# Patient Record
Sex: MALE | Race: WHITE | NOT HISPANIC OR LATINO
[De-identification: names, ages, dates, MRNs, and addresses within clinical notes are randomized per-mention and may not be internally consistent; named-entity substitution may affect disease eponyms.]

---

## 2019-05-01 ENCOUNTER — NON-APPOINTMENT (OUTPATIENT)
Age: 69
End: 2019-05-01

## 2019-05-01 ENCOUNTER — APPOINTMENT (OUTPATIENT)
Dept: CARDIOLOGY | Facility: CLINIC | Age: 69
End: 2019-05-01
Payer: COMMERCIAL

## 2019-05-01 VITALS
BODY MASS INDEX: 33.43 KG/M2 | RESPIRATION RATE: 16 BRPM | HEIGHT: 66 IN | SYSTOLIC BLOOD PRESSURE: 124 MMHG | HEART RATE: 56 BPM | WEIGHT: 208 LBS | DIASTOLIC BLOOD PRESSURE: 80 MMHG

## 2019-05-01 DIAGNOSIS — Z78.9 OTHER SPECIFIED HEALTH STATUS: ICD-10-CM

## 2019-05-01 DIAGNOSIS — Z82.49 FAMILY HISTORY OF ISCHEMIC HEART DISEASE AND OTHER DISEASES OF THE CIRCULATORY SYSTEM: ICD-10-CM

## 2019-05-01 DIAGNOSIS — Z72.3 LACK OF PHYSICAL EXERCISE: ICD-10-CM

## 2019-05-01 PROCEDURE — 99204 OFFICE O/P NEW MOD 45 MIN: CPT

## 2019-05-01 PROCEDURE — 93000 ELECTROCARDIOGRAM COMPLETE: CPT

## 2019-05-01 RX ORDER — EZETIMIBE AND SIMVASTATIN 10; 20 MG/1; MG/1
10-20 TABLET ORAL DAILY
Qty: 90 | Refills: 3 | Status: ACTIVE | COMMUNITY

## 2019-05-01 RX ORDER — FINASTERIDE 5 MG/1
5 TABLET, FILM COATED ORAL
Qty: 90 | Refills: 3 | Status: ACTIVE | COMMUNITY

## 2019-05-03 RX ORDER — OMEPRAZOLE MAGNESIUM 40 MG/1
40 CAPSULE, DELAYED RELEASE ORAL
Refills: 0 | Status: DISCONTINUED | COMMUNITY
End: 2019-05-03

## 2019-05-03 NOTE — REASON FOR VISIT
[FreeTextEntry1] : This is a 68-year-old male who presents here for initial cardiac evaluation with concerns about mid-sternal chest discomfort.  He has had these symptoms on and off since January.  They typically occur when he is anxious or stressed and described as a pressure-like discomfort.  It can last a variable period of time from a few minutes up to 15-20 minutes.  There is usually no radiation.  \par \par He is not physically active, however, 10 days ago he climbed three flights of stairs and had the same exact symptoms of pressure-like discomfort with some arm numbness and symptoms again lasted about 15 minutes, this time in association with shortness of breath.  \par \par The patient has no preexisting history of cardiovascular disease.  He does have risk factors which include:\par 1.  Hypertension.\par 2.  Hyperlipidemia.\par 3.  A very strong family history (father with CABG and brother stents).\par \par His home blood pressure typically runs about 135/85-90.  He does not have any palpitations or dizziness.   He does have obstructive sleep apnea but uses a CPAP for this. \par \par \par

## 2019-05-03 NOTE — ASSESSMENT
[FreeTextEntry1] : ECG shows low atrial ectopic rhythm with bradycardia at 56, left atrial enlargement and no significant ST-T wave changes.  \par \par Laboratory data from 4/5/19 shows total cholesterol of 156, HDL 47, LDL 73, triglycerides of 81. \par \par Impression:\par The patient is a 68-year-old with risk factors including:\par 1.  A strong family history.\par 2.  Hypertension. \par 3.  Hyperlipidemia. \par \par He has a chest pain syndrome with some typical and atypical features.  Because of his age and risk factor profile, a full evaluation should be undertaken to include an echocardiogram and an exercise sestamibi stress test.  The reason for going straight to the nuclear stress test is that his pre-test probability is fairly high and as such the regular stress test when negative would not be satisfied that this sufficiently excludes coronary artery disease. \par \par Blood pressure is probably too high and an adjustment of his antihypertensive regimen probably will need to be made. \par \par His cholesterol seems to be adequately controlled at this point in time.  \par \par Recommendations include:\par 1.  Beginning Plavix 75 mg a day. and convert omeprazole to Protonix 40 mg daily  \par 2.  Cannot use aspirin because of his history of GERD and very sensitive stomach.  \par 3.  Will give the patient sublingual nitroglycerine to use in a therapeutic trial should he have any further pain. \par 4.  Encouraged the patient to contact me if he has any change in his chest pain pattern. \par 4.  Will regroup after the nuclear stress test and the echocardiogram. \par

## 2019-05-13 ENCOUNTER — APPOINTMENT (OUTPATIENT)
Dept: CARDIOLOGY | Facility: CLINIC | Age: 69
End: 2019-05-13
Payer: COMMERCIAL

## 2019-05-13 PROCEDURE — 93015 CV STRESS TEST SUPVJ I&R: CPT

## 2019-05-13 PROCEDURE — A9500: CPT

## 2019-05-13 PROCEDURE — 78452 HT MUSCLE IMAGE SPECT MULT: CPT

## 2019-05-13 RX ADMIN — REGADENOSON 0 MG/5ML: 0.08 INJECTION, SOLUTION INTRAVENOUS at 00:00

## 2019-05-13 RX ADMIN — AMINOPHYLLINE 3 MG/ML: 25 INJECTION, SOLUTION INTRAVENOUS at 00:00

## 2019-05-14 RX ORDER — AMINOPHYLLINE 25 MG/ML
25 INJECTION, SOLUTION INTRAVENOUS
Qty: 0 | Refills: 0 | Status: COMPLETED | OUTPATIENT
Start: 2019-05-13

## 2019-05-14 RX ORDER — REGADENOSON 0.08 MG/ML
0.4 INJECTION, SOLUTION INTRAVENOUS
Qty: 4 | Refills: 0 | Status: COMPLETED | OUTPATIENT
Start: 2019-05-13

## 2019-05-15 ENCOUNTER — TRANSCRIPTION ENCOUNTER (OUTPATIENT)
Age: 69
End: 2019-05-15

## 2019-05-15 ENCOUNTER — INPATIENT (INPATIENT)
Facility: HOSPITAL | Age: 69
LOS: 8 days | Discharge: HOME | DRG: 234 | End: 2019-05-24
Attending: THORACIC SURGERY (CARDIOTHORACIC VASCULAR SURGERY) | Admitting: THORACIC SURGERY (CARDIOTHORACIC VASCULAR SURGERY)
Payer: COMMERCIAL

## 2019-05-15 VITALS
SYSTOLIC BLOOD PRESSURE: 129 MMHG | HEART RATE: 54 BPM | DIASTOLIC BLOOD PRESSURE: 96 MMHG | OXYGEN SATURATION: 99 % | RESPIRATION RATE: 16 BRPM | TEMPERATURE: 99 F

## 2019-05-15 DIAGNOSIS — I25.110 ATHEROSCLEROTIC HEART DISEASE OF NATIVE CORONARY ARTERY WITH UNSTABLE ANGINA PECTORIS: ICD-10-CM

## 2019-05-15 DIAGNOSIS — Z98.890 OTHER SPECIFIED POSTPROCEDURAL STATES: Chronic | ICD-10-CM

## 2019-05-15 DIAGNOSIS — R94.39 ABNORMAL RESULT OF OTHER CARDIOVASCULAR FUNCTION STUDY: ICD-10-CM

## 2019-05-15 DIAGNOSIS — K21.9 GASTRO-ESOPHAGEAL REFLUX DISEASE WITHOUT ESOPHAGITIS: ICD-10-CM

## 2019-05-15 DIAGNOSIS — G47.33 OBSTRUCTIVE SLEEP APNEA (ADULT) (PEDIATRIC): ICD-10-CM

## 2019-05-15 DIAGNOSIS — E78.5 HYPERLIPIDEMIA, UNSPECIFIED: ICD-10-CM

## 2019-05-15 DIAGNOSIS — R07.9 CHEST PAIN, UNSPECIFIED: ICD-10-CM

## 2019-05-15 DIAGNOSIS — I10 ESSENTIAL (PRIMARY) HYPERTENSION: ICD-10-CM

## 2019-05-15 LAB
ABO RH CONFIRMATION: SIGNIFICANT CHANGE UP
ALBUMIN SERPL ELPH-MCNC: 4.2 G/DL — SIGNIFICANT CHANGE UP (ref 3.3–5.2)
ALP SERPL-CCNC: 58 U/L — SIGNIFICANT CHANGE UP (ref 40–120)
ALT FLD-CCNC: 17 U/L — SIGNIFICANT CHANGE UP
ANION GAP SERPL CALC-SCNC: 11 MMOL/L — SIGNIFICANT CHANGE UP (ref 5–17)
ANION GAP SERPL CALC-SCNC: 14 MMOL/L — SIGNIFICANT CHANGE UP (ref 5–17)
APPEARANCE UR: CLEAR — SIGNIFICANT CHANGE UP
APTT BLD: 28.4 SEC — SIGNIFICANT CHANGE UP (ref 27.5–36.3)
AST SERPL-CCNC: 19 U/L — SIGNIFICANT CHANGE UP
BACTERIA # UR AUTO: NEGATIVE — SIGNIFICANT CHANGE UP
BASOPHILS # BLD AUTO: 0 K/UL — SIGNIFICANT CHANGE UP (ref 0–0.2)
BASOPHILS NFR BLD AUTO: 0.1 % — SIGNIFICANT CHANGE UP (ref 0–2)
BILIRUB DIRECT SERPL-MCNC: 0.1 MG/DL — SIGNIFICANT CHANGE UP (ref 0–0.3)
BILIRUB INDIRECT FLD-MCNC: 0.4 MG/DL — SIGNIFICANT CHANGE UP (ref 0.2–1)
BILIRUB SERPL-MCNC: 0.5 MG/DL — SIGNIFICANT CHANGE UP (ref 0.4–2)
BILIRUB UR-MCNC: NEGATIVE — SIGNIFICANT CHANGE UP
BLD GP AB SCN SERPL QL: SIGNIFICANT CHANGE UP
BUN SERPL-MCNC: 17 MG/DL — SIGNIFICANT CHANGE UP (ref 8–20)
BUN SERPL-MCNC: 21 MG/DL — HIGH (ref 8–20)
CALCIUM SERPL-MCNC: 10.1 MG/DL — SIGNIFICANT CHANGE UP (ref 8.6–10.2)
CALCIUM SERPL-MCNC: 9.2 MG/DL — SIGNIFICANT CHANGE UP (ref 8.6–10.2)
CHLORIDE SERPL-SCNC: 104 MMOL/L — SIGNIFICANT CHANGE UP (ref 98–107)
CHLORIDE SERPL-SCNC: 108 MMOL/L — HIGH (ref 98–107)
CHOLEST SERPL-MCNC: 122 MG/DL — SIGNIFICANT CHANGE UP (ref 110–199)
CO2 SERPL-SCNC: 23 MMOL/L — SIGNIFICANT CHANGE UP (ref 22–29)
CO2 SERPL-SCNC: 25 MMOL/L — SIGNIFICANT CHANGE UP (ref 22–29)
COLOR SPEC: YELLOW — SIGNIFICANT CHANGE UP
CREAT SERPL-MCNC: 0.79 MG/DL — SIGNIFICANT CHANGE UP (ref 0.5–1.3)
CREAT SERPL-MCNC: 0.8 MG/DL — SIGNIFICANT CHANGE UP (ref 0.5–1.3)
DIFF PNL FLD: ABNORMAL
EOSINOPHIL # BLD AUTO: 0.1 K/UL — SIGNIFICANT CHANGE UP (ref 0–0.5)
EOSINOPHIL NFR BLD AUTO: 1 % — SIGNIFICANT CHANGE UP (ref 0–5)
EPI CELLS # UR: NEGATIVE — SIGNIFICANT CHANGE UP
GLUCOSE SERPL-MCNC: 119 MG/DL — HIGH (ref 70–115)
GLUCOSE SERPL-MCNC: 93 MG/DL — SIGNIFICANT CHANGE UP (ref 70–115)
GLUCOSE UR QL: NEGATIVE MG/DL — SIGNIFICANT CHANGE UP
HBA1C BLD-MCNC: 5.5 % — SIGNIFICANT CHANGE UP (ref 4–5.6)
HCT VFR BLD CALC: 42.8 % — SIGNIFICANT CHANGE UP (ref 42–52)
HCT VFR BLD CALC: 45.7 % — SIGNIFICANT CHANGE UP (ref 42–52)
HDLC SERPL-MCNC: 46 MG/DL — SIGNIFICANT CHANGE UP
HGB BLD-MCNC: 14.3 G/DL — SIGNIFICANT CHANGE UP (ref 14–18)
HGB BLD-MCNC: 15.1 G/DL — SIGNIFICANT CHANGE UP (ref 14–18)
INR BLD: 1.06 RATIO — SIGNIFICANT CHANGE UP (ref 0.88–1.16)
INR BLD: 1.08 RATIO — SIGNIFICANT CHANGE UP (ref 0.88–1.16)
KETONES UR-MCNC: NEGATIVE — SIGNIFICANT CHANGE UP
LEUKOCYTE ESTERASE UR-ACNC: NEGATIVE — SIGNIFICANT CHANGE UP
LIPID PNL WITH DIRECT LDL SERPL: 64 MG/DL — SIGNIFICANT CHANGE UP
LYMPHOCYTES # BLD AUTO: 1.9 K/UL — SIGNIFICANT CHANGE UP (ref 1–4.8)
LYMPHOCYTES # BLD AUTO: 28.2 % — SIGNIFICANT CHANGE UP (ref 20–55)
MAGNESIUM SERPL-MCNC: 2.2 MG/DL — SIGNIFICANT CHANGE UP (ref 1.6–2.6)
MCHC RBC-ENTMCNC: 27.8 PG — SIGNIFICANT CHANGE UP (ref 27–31)
MCHC RBC-ENTMCNC: 28.1 PG — SIGNIFICANT CHANGE UP (ref 27–31)
MCHC RBC-ENTMCNC: 33 G/DL — SIGNIFICANT CHANGE UP (ref 32–36)
MCHC RBC-ENTMCNC: 33.4 G/DL — SIGNIFICANT CHANGE UP (ref 32–36)
MCV RBC AUTO: 84.1 FL — SIGNIFICANT CHANGE UP (ref 80–94)
MCV RBC AUTO: 84.2 FL — SIGNIFICANT CHANGE UP (ref 80–94)
MONOCYTES # BLD AUTO: 0.7 K/UL — SIGNIFICANT CHANGE UP (ref 0–0.8)
MONOCYTES NFR BLD AUTO: 9.9 % — SIGNIFICANT CHANGE UP (ref 3–10)
NEUTROPHILS # BLD AUTO: 4.1 K/UL — SIGNIFICANT CHANGE UP (ref 1.8–8)
NEUTROPHILS NFR BLD AUTO: 60.7 % — SIGNIFICANT CHANGE UP (ref 37–73)
NITRITE UR-MCNC: NEGATIVE — SIGNIFICANT CHANGE UP
NT-PROBNP SERPL-SCNC: 214 PG/ML — SIGNIFICANT CHANGE UP (ref 0–300)
PA ADP PRP-ACNC: 153 PRU — LOW (ref 180–376)
PH UR: 6 — SIGNIFICANT CHANGE UP (ref 5–8)
PLATELET # BLD AUTO: 187 K/UL — SIGNIFICANT CHANGE UP (ref 150–400)
PLATELET # BLD AUTO: 196 K/UL — SIGNIFICANT CHANGE UP (ref 150–400)
POTASSIUM SERPL-MCNC: 3.7 MMOL/L — SIGNIFICANT CHANGE UP (ref 3.5–5.3)
POTASSIUM SERPL-MCNC: 4.4 MMOL/L — SIGNIFICANT CHANGE UP (ref 3.5–5.3)
POTASSIUM SERPL-SCNC: 3.7 MMOL/L — SIGNIFICANT CHANGE UP (ref 3.5–5.3)
POTASSIUM SERPL-SCNC: 4.4 MMOL/L — SIGNIFICANT CHANGE UP (ref 3.5–5.3)
PREALB SERPL-MCNC: 24 MG/DL — SIGNIFICANT CHANGE UP (ref 18–38)
PROT SERPL-MCNC: 7.4 G/DL — SIGNIFICANT CHANGE UP (ref 6.6–8.7)
PROT UR-MCNC: 15 MG/DL
PROTHROM AB SERPL-ACNC: 12.2 SEC — SIGNIFICANT CHANGE UP (ref 10–12.9)
PROTHROM AB SERPL-ACNC: 12.5 SEC — SIGNIFICANT CHANGE UP (ref 10–12.9)
RBC # BLD: 5.09 M/UL — SIGNIFICANT CHANGE UP (ref 4.6–6.2)
RBC # BLD: 5.43 M/UL — SIGNIFICANT CHANGE UP (ref 4.6–6.2)
RBC # FLD: 13.8 % — SIGNIFICANT CHANGE UP (ref 11–15.6)
RBC # FLD: 13.9 % — SIGNIFICANT CHANGE UP (ref 11–15.6)
RBC CASTS # UR COMP ASSIST: SIGNIFICANT CHANGE UP /HPF (ref 0–4)
SODIUM SERPL-SCNC: 141 MMOL/L — SIGNIFICANT CHANGE UP (ref 135–145)
SODIUM SERPL-SCNC: 144 MMOL/L — SIGNIFICANT CHANGE UP (ref 135–145)
SP GR SPEC: 1.01 — SIGNIFICANT CHANGE UP (ref 1.01–1.02)
TOTAL CHOLESTEROL/HDL RATIO MEASUREMENT: 3 RATIO — LOW (ref 3.4–9.6)
TRIGL SERPL-MCNC: 61 MG/DL — SIGNIFICANT CHANGE UP (ref 10–200)
TYPE + AB SCN PNL BLD: SIGNIFICANT CHANGE UP
UROBILINOGEN FLD QL: NEGATIVE MG/DL — SIGNIFICANT CHANGE UP
WBC # BLD: 6.7 K/UL — SIGNIFICANT CHANGE UP (ref 4.8–10.8)
WBC # BLD: 8 K/UL — SIGNIFICANT CHANGE UP (ref 4.8–10.8)
WBC # FLD AUTO: 6.7 K/UL — SIGNIFICANT CHANGE UP (ref 4.8–10.8)
WBC # FLD AUTO: 8 K/UL — SIGNIFICANT CHANGE UP (ref 4.8–10.8)
WBC UR QL: SIGNIFICANT CHANGE UP

## 2019-05-15 PROCEDURE — 99222 1ST HOSP IP/OBS MODERATE 55: CPT

## 2019-05-15 PROCEDURE — 93010 ELECTROCARDIOGRAM REPORT: CPT

## 2019-05-15 PROCEDURE — 71045 X-RAY EXAM CHEST 1 VIEW: CPT | Mod: 26

## 2019-05-15 PROCEDURE — 93880 EXTRACRANIAL BILAT STUDY: CPT | Mod: 26

## 2019-05-15 PROCEDURE — 93306 TTE W/DOPPLER COMPLETE: CPT | Mod: 26

## 2019-05-15 RX ORDER — CHLORHEXIDINE GLUCONATE 213 G/1000ML
1 SOLUTION TOPICAL ONCE
Refills: 0 | Status: COMPLETED | OUTPATIENT
Start: 2019-05-16 | End: 2019-05-16

## 2019-05-15 RX ORDER — ATENOLOL 25 MG/1
25 TABLET ORAL DAILY
Refills: 0 | Status: DISCONTINUED | OUTPATIENT
Start: 2019-05-15 | End: 2019-05-18

## 2019-05-15 RX ORDER — AMLODIPINE BESYLATE 2.5 MG/1
5 TABLET ORAL DAILY
Refills: 0 | Status: DISCONTINUED | OUTPATIENT
Start: 2019-05-15 | End: 2019-05-15

## 2019-05-15 RX ORDER — AMLODIPINE BESYLATE 2.5 MG/1
5 TABLET ORAL DAILY
Refills: 0 | Status: DISCONTINUED | OUTPATIENT
Start: 2019-05-15 | End: 2019-05-18

## 2019-05-15 RX ORDER — SODIUM CHLORIDE 9 MG/ML
1000 INJECTION INTRAMUSCULAR; INTRAVENOUS; SUBCUTANEOUS
Refills: 0 | Status: DISCONTINUED | OUTPATIENT
Start: 2019-05-15 | End: 2019-05-15

## 2019-05-15 RX ORDER — PANTOPRAZOLE SODIUM 20 MG/1
40 TABLET, DELAYED RELEASE ORAL
Refills: 0 | Status: DISCONTINUED | OUTPATIENT
Start: 2019-05-15 | End: 2019-05-18

## 2019-05-15 RX ORDER — SIMVASTATIN 20 MG/1
20 TABLET, FILM COATED ORAL AT BEDTIME
Refills: 0 | Status: DISCONTINUED | OUTPATIENT
Start: 2019-05-15 | End: 2019-05-18

## 2019-05-15 RX ORDER — CEFUROXIME AXETIL 250 MG
1500 TABLET ORAL ONCE
Refills: 0 | Status: DISCONTINUED | OUTPATIENT
Start: 2019-05-16 | End: 2019-05-18

## 2019-05-15 RX ORDER — CHLORHEXIDINE GLUCONATE 213 G/1000ML
1 SOLUTION TOPICAL ONCE
Refills: 0 | Status: COMPLETED | OUTPATIENT
Start: 2019-05-15 | End: 2019-05-15

## 2019-05-15 RX ORDER — NITROGLYCERIN 6.5 MG
0.4 CAPSULE, EXTENDED RELEASE ORAL
Refills: 0 | Status: DISCONTINUED | OUTPATIENT
Start: 2019-05-15 | End: 2019-05-18

## 2019-05-15 RX ORDER — ASPIRIN/CALCIUM CARB/MAGNESIUM 324 MG
81 TABLET ORAL ONCE
Refills: 0 | Status: COMPLETED | OUTPATIENT
Start: 2019-05-15 | End: 2019-05-15

## 2019-05-15 RX ORDER — MODAFINIL 200 MG/1
200 TABLET ORAL DAILY
Refills: 0 | Status: DISCONTINUED | OUTPATIENT
Start: 2019-05-15 | End: 2019-05-15

## 2019-05-15 RX ORDER — HEPARIN SODIUM 5000 [USP'U]/ML
7500 INJECTION INTRAVENOUS; SUBCUTANEOUS EVERY 6 HOURS
Refills: 0 | Status: DISCONTINUED | OUTPATIENT
Start: 2019-05-15 | End: 2019-05-15

## 2019-05-15 RX ORDER — HEPARIN SODIUM 5000 [USP'U]/ML
1700 INJECTION INTRAVENOUS; SUBCUTANEOUS
Qty: 25000 | Refills: 0 | Status: DISCONTINUED | OUTPATIENT
Start: 2019-05-15 | End: 2019-05-18

## 2019-05-15 RX ORDER — ATENOLOL 25 MG/1
25 TABLET ORAL
Refills: 0 | Status: DISCONTINUED | OUTPATIENT
Start: 2019-05-15 | End: 2019-05-15

## 2019-05-15 RX ORDER — FINASTERIDE 5 MG/1
5 TABLET, FILM COATED ORAL DAILY
Refills: 0 | Status: DISCONTINUED | OUTPATIENT
Start: 2019-05-15 | End: 2019-05-18

## 2019-05-15 RX ORDER — HEPARIN SODIUM 5000 [USP'U]/ML
INJECTION INTRAVENOUS; SUBCUTANEOUS
Qty: 25000 | Refills: 0 | Status: DISCONTINUED | OUTPATIENT
Start: 2019-05-15 | End: 2019-05-15

## 2019-05-15 RX ORDER — CHLORHEXIDINE GLUCONATE 213 G/1000ML
15 SOLUTION TOPICAL ONCE
Refills: 0 | Status: COMPLETED | OUTPATIENT
Start: 2019-05-15 | End: 2019-05-15

## 2019-05-15 RX ORDER — VANCOMYCIN HCL 1 G
1000 VIAL (EA) INTRAVENOUS ONCE
Refills: 0 | Status: DISCONTINUED | OUTPATIENT
Start: 2019-05-16 | End: 2019-05-18

## 2019-05-15 RX ORDER — MUPIROCIN 20 MG/G
1 OINTMENT TOPICAL
Refills: 0 | Status: DISCONTINUED | OUTPATIENT
Start: 2019-05-15 | End: 2019-05-18

## 2019-05-15 RX ORDER — HEPARIN SODIUM 5000 [USP'U]/ML
3500 INJECTION INTRAVENOUS; SUBCUTANEOUS EVERY 6 HOURS
Refills: 0 | Status: DISCONTINUED | OUTPATIENT
Start: 2019-05-15 | End: 2019-05-15

## 2019-05-15 RX ADMIN — PANTOPRAZOLE SODIUM 40 MILLIGRAM(S): 20 TABLET, DELAYED RELEASE ORAL at 18:55

## 2019-05-15 RX ADMIN — CHLORHEXIDINE GLUCONATE 1 APPLICATION(S): 213 SOLUTION TOPICAL at 23:43

## 2019-05-15 RX ADMIN — SIMVASTATIN 20 MILLIGRAM(S): 20 TABLET, FILM COATED ORAL at 21:45

## 2019-05-15 RX ADMIN — AMLODIPINE BESYLATE 5 MILLIGRAM(S): 2.5 TABLET ORAL at 18:50

## 2019-05-15 RX ADMIN — HEPARIN SODIUM 1700 UNIT(S)/HR: 5000 INJECTION INTRAVENOUS; SUBCUTANEOUS at 17:41

## 2019-05-15 RX ADMIN — Medication 81 MILLIGRAM(S): at 11:21

## 2019-05-15 RX ADMIN — MUPIROCIN 1 APPLICATION(S): 20 OINTMENT TOPICAL at 21:45

## 2019-05-15 RX ADMIN — CHLORHEXIDINE GLUCONATE 15 MILLILITER(S): 213 SOLUTION TOPICAL at 18:49

## 2019-05-15 NOTE — DISCHARGE NOTE PROVIDER - NSDCCPTREATMENT_GEN_ALL_CORE_FT
PRINCIPAL PROCEDURE  Procedure: CABG, with SHAUNNA  Findings and Treatment: LIMA-LAD, SVG-OM, SVG-Ramus         SECONDARY PROCEDURE  Procedure: [place], vein, endoscopic  Findings and Treatment: right leg greater saphenous vein harvest

## 2019-05-15 NOTE — H&P PST ADULT - LAST STRESS TEST
5/13/19 mid-basal inferior wall infarct with moderate arlyn-infarct ischemia in the inferior and lateral walls EF 63%

## 2019-05-15 NOTE — DISCHARGE NOTE PROVIDER - PROVIDER TOKENS
PROVIDER:[TOKEN:[[id number]]],PROVIDER:[TOKEN:[[id number]]],PROVIDER:[TOKEN:[[id number]]]

## 2019-05-15 NOTE — DISCHARGE NOTE PROVIDER - NSDCCPCAREPLAN_GEN_ALL_CORE_FT
PRINCIPAL DISCHARGE DIAGNOSIS  Diagnosis: Coronary artery disease involving native coronary artery of native heart, angina presence unspecified  Assessment and Plan of Treatment: Coronary artery disease involving native coronary artery of native heart, angina presence unspecified  Follow up appointment with Dr Estrada on   Cardiac surgery office second floor at [facility]   Shower daily, no bathing swimming in a pool or the ocean until instructed by MD.    We advise that you do not drive until instructed by MD.   You may not return to work until instructed by MD.   DO NOT APPLY CREAM POWDER LOTION OR OINTMENT TO ANY SURGICAL WOUND>THIS CAN IMPEDE HEALING AND CAUSE AN INFECTION  Please eat a low fat low salt diet.   Please come to ED or Call Cardio thoracic office at [phone] if Chest pain, Shortness of Breath, persistant Nausea & vomiting, oozing from wounds,palpitations or pain not relieved with medication  Weigh yourself daily>notify surgeons office if  2 lb increase in weight in 24 hours.   Wash incisions with soap and water using washcloth in shower daily      SECONDARY DISCHARGE DIAGNOSES  Diagnosis: Gross hematuria  Assessment and Plan of Treatment: Gross hematuria  Please follow up with the urologist if you continue to have blood in the urine. PRINCIPAL DISCHARGE DIAGNOSIS  Diagnosis: Coronary artery disease involving native coronary artery of native heart, angina presence unspecified  Assessment and Plan of Treatment: Coronary artery disease involving native coronary artery of native heart, angina presence unspecified  Follow up appointment with Dr Estrada on 6/5/19 at 2:00pm  Cardiac surgery office second floor at [facility] [place]   Shower daily, no bathing swimming in a pool or the ocean until instructed by MD.    We advise that you do not drive until instructed by MD.   You may not return to work until instructed by MD.   DO NOT APPLY CREAM POWDER LOTION OR OINTMENT TO ANY SURGICAL WOUND>THIS CAN IMPEDE HEALING AND CAUSE AN INFECTION  Please eat a low fat low salt diet.   Please come to ED or Call Cardio thoracic office at [phone] if Chest pain, Shortness of Breath, persistant Nausea & vomiting, oozing from wounds,palpitations or pain not relieved with medication  Weigh yourself daily>notify surgeons office if  2 lb increase in weight in 24 hours.   Wash incisions with soap and water using washcloth in shower daily      SECONDARY DISCHARGE DIAGNOSES  Diagnosis: Gross hematuria  Assessment and Plan of Treatment: Gross hematuria  Please follow up with the urologist if you continue to have blood in the urine.

## 2019-05-15 NOTE — DISCHARGE NOTE PROVIDER - HOSPITAL COURSE
68 year old male with PMH CAD, PCI, GERD, BPH, HTN, HLD p/w midsternal chest discomfort > cath revealed MVD.        5/18 s/p CABG x 3 (LIMA to LAD, SVG to OM, SVG to ramus) with Dr Estrada        Postoperative course notable for bradycardia immediately postop, now resolved. Patient also had some hematuria and dysuria. Patient has also developed some nausea and dizziness, worse since antibiotic started empirically for hematuria/dysuria > stopped. No hypotension. Isolated lipase elevation

## 2019-05-15 NOTE — DISCHARGE NOTE PROVIDER - NSDCACTIVITY_GEN_ALL_CORE
Walking - Indoors allowed/No heavy lifting/straining/Showering allowed/Do not make important decisions/Do not drive or operate machinery/Walking - Outdoors allowed/Stairs allowed

## 2019-05-15 NOTE — PROGRESS NOTE ADULT - SUBJECTIVE AND OBJECTIVE BOX
I have examined the patient. I have explained risk and benefits. I have attained consent. I agree with a cardiac catheterization.

## 2019-05-15 NOTE — H&P PST ADULT - NSICDXPASTMEDICALHX_GEN_ALL_CORE_FT
PAST MEDICAL HISTORY:  BPH (benign prostatic hyperplasia)     CAD S/P percutaneous coronary angioplasty     Chest pain     GERD (gastroesophageal reflux disease)     Hyperlipidemia     Hypertension     SOB (shortness of breath)

## 2019-05-15 NOTE — CONSULT NOTE ADULT - ASSESSMENT
68y Male h/o GERD, stomach polyps, BPH, HLD, HTN, borderline DM, REJI on CPAP, hemorrhoids bilateral inguinal hernia repair with mesh x6 presents with MVD.  Patient currently symptoms free, stable, NAD.

## 2019-05-15 NOTE — ASU PATIENT PROFILE, ADULT - NS PRO PASSIVE SMOKE EXP
I concur with the Admission Order and I certify that services are provided in accordance with Section 42 CFR § 412.3 No

## 2019-05-15 NOTE — CHART NOTE - NSCHARTNOTEFT_GEN_A_CORE
bilateral LE ultrasound performed to assess patency of GSV.  Results as follows     R leg  upper thigh 0.4cm  mid thigh 0.47cm  knee 0.3cm  mid calf 0.47cm  ankle 0.54cm  +variocosities    L Leg  upper thigh 0.37  mid thigh 0.45cm  knee 0.4cm bifurcated  mid calf 0.3cm bifurcated and joins into a lumen that is non compressible.  ?stenosis  +step off

## 2019-05-15 NOTE — PROGRESS NOTE ADULT - SUBJECTIVE AND OBJECTIVE BOX
This is a 68-year-old male who presents here for initial cardiac evaluation with concerns about mid-sternal chest discomfort. He has had these symptoms on and off since January. They typically occur when he is anxious or stressed and described as a pressure-like discomfort. It can last a variable period of time from a few minutes up to 15-20 minutes. There is usually no radiation.     He is not physically active, however, 10 days ago he climbed three flights of stairs and had the same exact symptoms of pressure-like discomfort with some arm numbness and symptoms again lasted about 15 minutes, this time in association with shortness of breath.     The patient has no preexisting history of cardiovascular disease. He does have risk factors which include:  1. Hypertension.  2. Hyperlipidemia.  3. A very strong family history (father with CABG and brother stents).    His home blood pressure typically runs about 135/85-90. He does not have any palpitations or dizziness. He does have obstructive sleep apnea but uses a CPAP for this.     S/P cath  3VD  Official report pending  D/C plavix   D/C benazapril  Start Heparin gtts at 5pm no initial bolus  Admit to Dr Estrada service for CABG

## 2019-05-15 NOTE — CONSULT NOTE ADULT - SUBJECTIVE AND OBJECTIVE BOX
Surgeon: Sean    Consult requesting by: Oz    HISTORY OF PRESENT ILLNESS:  68y Male h/o GERD, stomach polyps, BPH, HLD, HTN, borderline DM, REJI on CPAP, hemorrhoids bilateral inguinal hernia repair with mesh x6 presents c/o approximately 2 month history of CP and anxious feeling while at rest.  Patient states the symptoms were worse when thinking of emotionally charged events and attributed them to his GERD and anxiety.  He recently developed the same feeling after walking up three flights of stairs, which was again relieved with rest.  He has a strong family history of heart disesae, his father had CABG x2 and  from an MI, one brother has stents, and one brother has bradycardia which is "being managed medically."  Patient denies smoking, drinking, or other ilicit drug use.  He went to Dr. Osorio who placed him on plavix and SL nitroglycerin and ordered a nuclear stress test which showed inferior wall akinesis and EF 63%.  He underwent a cardiac catherization today and was found with MVD so he was referred for surgical mgmt.  Currently CP free, resting comfortably in bed.     PAST MEDICAL & SURGICAL HISTORY:  CAD S/P percutaneous coronary angioplasty  GERD (gastroesophageal reflux disease)  SOB (shortness of breath)  Chest pain  BPH (benign prostatic hyperplasia)  Hyperlipidemia  Hypertension  H/O hernia repair      MEDICATIONS  (STANDING):  amLODIPine   Tablet 5 milliGRAM(s) Oral daily  ATENolol  Tablet 25 milliGRAM(s) Oral two times a day  chlorhexidine 0.12% Liquid 15 milliLiter(s) Swish and Spit once  chlorhexidine 4% Liquid 1 Application(s) Topical once  finasteride 5 milliGRAM(s) Oral daily  heparin  Infusion.  Unit(s)/Hr (17 mL/Hr) IV Continuous <Continuous>  pantoprazole    Tablet 40 milliGRAM(s) Oral before breakfast  simvastatin 20 milliGRAM(s) Oral at bedtime    MEDICATIONS  (PRN):  heparin  Injectable 7500 Unit(s) IV Push every 6 hours PRN For aPTT less than 40  heparin  Injectable 3500 Unit(s) IV Push every 6 hours PRN For aPTT between 40 - 57  nitroglycerin     SubLingual 0.4 milliGRAM(s) SubLingual every 5 minutes PRN Chest Pain    Antiplatelet therapy:              plavix            Last dose/amt: 75mg 5/15/19     Allergies    No Known Allergies    Intolerances        SOCIAL HISTORY:  Smoker: [ ] Yes  [ x] No        PACK YEARS:                         WHEN QUIT?  ETOH use: [ ] Yes  [x ] No              FREQUENCY / QUANTITY:  Ilicit Drug use:  [ ] Yes  [ x] No  Occupation: retired systems analysis for [employer]  Live with: wife  Assisted device use: none    FAMILY HISTORY:  FH: hypertension, CORONARY ARTERY DISEASE      Review of Systems  CONSTITUTIONAL:  Fevers[ ] chills[ ] sweats[ ] fatigue[ ] weight loss[ ] weight gain [ ]                                     NEGATIVE [x ]   NEURO:  parathesias[ ] seizures [ ]  syncope [ ]  confusion [ ]                                                                                NEGATIVE[x ]   EYES: glasses[ ]  blurry vision[ ]  discharge[ ] pain[ ] glaucoma [ ]                                                                          NEGATIVE[x ]   ENMT:  difficulty hearing [ ]  vertigo[ ]  dysphagia[ ] epistaxis[ ] recent dental work [ ]                                    NEGATIVE[ ]   CV:  chest pain[x ] palpitations[ ] FELIX [ ] diaphoresis [ ]                                                                                           NEGATIVE[ ]   RESPIRATORY:  wheezing[ ] SOB[x ] cough [ ] sputum[ ] hemoptysis[ ]                                                                  NEGATIVE[ ]   GI:  nausea[ ]  vommiting [ ]  diarrhea[ ] constipation [ ] melena [ ]                                                                      NEGATIVE[x ]   : hematuria[ ]  dysuria[ ] urgency[ ] incontinence[ ]                                                                                            NEGATIVE[x ]   MUSKULOSKELETAL:  arthritis[ ]  joint swelling [ ] muscle weakness [ ]                                                                NEGATIVEx[ ]   SKIN/BREAST:  rash[ ] itching [ ]  hair loss[ ] masses[ ]                                                                                              NEGATIVE[x ]   PSYCH:  dementia [ ] depresion [ ] anxiety[ ]                                                                                                               NEGATIVE[x ]   HEME/LYMPH:  bruises easily[ ] enlarged lymph nodes[ ] tender lymph nodes[ ]                                               NEGATIVE[x ]   ENDOCRINE:  cold intolerance[ ] heat intolerance[ ] polydipsia[ ]                                                                          NEGATIVE[x ]     PHYSICAL EXAM  Vital Signs Last 24 Hrs  T(C): 37.2 (15 May 2019 10:26), Max: 37.2 (15 May 2019 10:26)  T(F): 99 (15 May 2019 10:26), Max: 99 (15 May 2019 10:26)  HR: 59 (15 May 2019 15:25) (54 - 59)  BP: 157/72 (15 May 2019 15:25) (124/71 - 157/80)  BP(mean): --  RR: 16 (15 May 2019 15:25) (16 - 19)  SpO2: 98% (15 May 2019 15:25) (96% - 99%)    CONSTITUTIONAL:                                                                          WNL[x ]   Neuro: WNL[x ] Normal exam oriented to person/place & time with no focal motor or sensory  deficits. Other __________                    Eyes: WNL[ ]   Normal exam of conjunctiva & lids, pupils equally reactive. Other__+GLASSES____________________________     ENT: WNL[ ]    Normal exam of nasal/oral mucosa with absence of cyanosis. Other__________+POOR DENTITION______________  Neck: WNL[X ]  Normal exam of jugular veins, trachea & thyroid. Other_________________________________________  Chest: WNL[X ] Normal lung exam with good air movement absence of wheezes, rales, or rhonchi: Other_________________________________________                                                                                CV:  Auscultation: normal [ X] S3[ ] S4[ ] Irregular [ ] Rub[ ] Clicks[ ]    Murmurs none:[X ]systolic [ ]  diastolic [ ] holosystolic [ ]  Carotids: No Bruits[X ] Other____________ Abdominal Aorta: normal [ ] nonpalpable[ ]Other___________                                                                                      GI: WNL[X ] Normal exam of abdomen, liver & spleen with no noted masses or tenderness. Other______________________                                                                                                        Extremities: WNLX[ ] Normal no evidence of cyanosis or deformity Edema: none[ ]trace[X ]1+[ ]2+[ ]3+[ ]4+[ ]  R GROIN CATH SITE C/D/I, NO BLEEDING, NO HEMATOMA  Lower Extremity Pulses: Right[X ] Left[X ]Varicosities[ X]  SKIN :WNL[X ] Normal exam to inspection & palation. Other:____________________                                                          LABS:                        15.1   8.0   )-----------( 196      ( 15 May 2019 10:48 )             45.7     05-15    144  |  108<H>  |  21.0<H>  ----------------------------<  119<H>  4.4   |  25.0  |  0.80    Ca    10.1      15 May 2019 10:48  Mg     2.2     05-15      PT/INR - ( 15 May 2019 10:48 )   PT: 12.2 sec;   INR: 1.06 ratio                     Cardiac Cath: results pending    TTE / SHAUNNA: results pending

## 2019-05-15 NOTE — H&P PST ADULT - HISTORY OF PRESENT ILLNESS
69 yo male with history of BPH, HTN, HLD, chest pain with exertion CCS 3, pain radiating to left arm with numbness and abnormal stress test here for PST and cath to r/o CAD. This is a 68-year-old male who presents here for initial cardiac evaluation with concerns about mid-sternal chest discomfort. He has had these symptoms on and off since January. They typically occur when he is anxious or stressed and described as a pressure-like discomfort. It can last a variable period of time from a few minutes up to 15-20 minutes. There is usually no radiation.     He is not physically active, however, 10 days ago he climbed three flights of stairs and had the same exact symptoms of pressure-like discomfort with some arm numbness and symptoms again lasted about 15 minutes, this time in association with shortness of breath.     The patient has no preexisting history of cardiovascular disease. He does have risk factors which include:  1. Hypertension.  2. Hyperlipidemia.  3. A very strong family history (father with CABG and brother stents).    His home blood pressure typically runs about 135/85-90. He does not have any palpitations or dizziness. He does have obstructive sleep apnea but uses a CPAP for this.

## 2019-05-15 NOTE — H&P PST ADULT - ASSESSMENT
69 yo male with history of BPH, HTN, HLD, chest pain with exertion CCS 3, pain radiating to left arm with numbness and abnormal stress test here for PST and cath to r/o CAD.

## 2019-05-15 NOTE — CONSULT NOTE ADULT - PROBLEM SELECTOR RECOMMENDATION 9
start heparin gtt  preop labs  check p2y12  NPO for OR 5/16  ultrasound scan of bl GSV  type and screen  echo  carotid duplex  hold asa for pending procedure  add beta blocker and statin

## 2019-05-15 NOTE — DISCHARGE NOTE PROVIDER - CARE PROVIDERS DIRECT ADDRESSES
[email or web address]

## 2019-05-16 DIAGNOSIS — Z98.890 OTHER SPECIFIED POSTPROCEDURAL STATES: ICD-10-CM

## 2019-05-16 DIAGNOSIS — I25.10 ATHEROSCLEROTIC HEART DISEASE OF NATIVE CORONARY ARTERY WITHOUT ANGINA PECTORIS: ICD-10-CM

## 2019-05-16 PROBLEM — I10 ESSENTIAL (PRIMARY) HYPERTENSION: Chronic | Status: ACTIVE | Noted: 2019-05-15

## 2019-05-16 PROBLEM — N40.0 BENIGN PROSTATIC HYPERPLASIA WITHOUT LOWER URINARY TRACT SYMPTOMS: Chronic | Status: ACTIVE | Noted: 2019-05-15

## 2019-05-16 PROBLEM — E78.5 HYPERLIPIDEMIA, UNSPECIFIED: Chronic | Status: ACTIVE | Noted: 2019-05-15

## 2019-05-16 PROBLEM — R06.02 SHORTNESS OF BREATH: Chronic | Status: ACTIVE | Noted: 2019-05-15

## 2019-05-16 PROBLEM — K21.9 GASTRO-ESOPHAGEAL REFLUX DISEASE WITHOUT ESOPHAGITIS: Chronic | Status: ACTIVE | Noted: 2019-05-15

## 2019-05-16 LAB
APTT BLD: 100.2 SEC — HIGH (ref 27.5–36.3)
APTT BLD: 73.6 SEC — HIGH (ref 27.5–36.3)
HCT VFR BLD CALC: 41.3 % — LOW (ref 42–52)
HCT VFR BLD CALC: 42.2 % — SIGNIFICANT CHANGE UP (ref 42–52)
HGB BLD-MCNC: 13.5 G/DL — LOW (ref 14–18)
HGB BLD-MCNC: 14.1 G/DL — SIGNIFICANT CHANGE UP (ref 14–18)
MCHC RBC-ENTMCNC: 27.6 PG — SIGNIFICANT CHANGE UP (ref 27–31)
MCHC RBC-ENTMCNC: 27.7 PG — SIGNIFICANT CHANGE UP (ref 27–31)
MCHC RBC-ENTMCNC: 32.7 G/DL — SIGNIFICANT CHANGE UP (ref 32–36)
MCHC RBC-ENTMCNC: 33.4 G/DL — SIGNIFICANT CHANGE UP (ref 32–36)
MCV RBC AUTO: 82.9 FL — SIGNIFICANT CHANGE UP (ref 80–94)
MCV RBC AUTO: 84.5 FL — SIGNIFICANT CHANGE UP (ref 80–94)
MRSA PCR RESULT.: SIGNIFICANT CHANGE UP
PA ADP PRP-ACNC: 180 PRU — SIGNIFICANT CHANGE UP (ref 180–376)
PLATELET # BLD AUTO: 176 K/UL — SIGNIFICANT CHANGE UP (ref 150–400)
PLATELET # BLD AUTO: 192 K/UL — SIGNIFICANT CHANGE UP (ref 150–400)
RBC # BLD: 4.89 M/UL — SIGNIFICANT CHANGE UP (ref 4.6–6.2)
RBC # BLD: 5.09 M/UL — SIGNIFICANT CHANGE UP (ref 4.6–6.2)
RBC # FLD: 13.9 % — SIGNIFICANT CHANGE UP (ref 11–15.6)
RBC # FLD: 14.1 % — SIGNIFICANT CHANGE UP (ref 11–15.6)
S AUREUS DNA NOSE QL NAA+PROBE: DETECTED
WBC # BLD: 7.2 K/UL — SIGNIFICANT CHANGE UP (ref 4.8–10.8)
WBC # BLD: 7.3 K/UL — SIGNIFICANT CHANGE UP (ref 4.8–10.8)
WBC # FLD AUTO: 7.2 K/UL — SIGNIFICANT CHANGE UP (ref 4.8–10.8)
WBC # FLD AUTO: 7.3 K/UL — SIGNIFICANT CHANGE UP (ref 4.8–10.8)

## 2019-05-16 PROCEDURE — 99232 SBSQ HOSP IP/OBS MODERATE 35: CPT

## 2019-05-16 PROCEDURE — 93010 ELECTROCARDIOGRAM REPORT: CPT

## 2019-05-16 RX ADMIN — MUPIROCIN 1 APPLICATION(S): 20 OINTMENT TOPICAL at 17:36

## 2019-05-16 RX ADMIN — FINASTERIDE 5 MILLIGRAM(S): 5 TABLET, FILM COATED ORAL at 11:21

## 2019-05-16 RX ADMIN — PANTOPRAZOLE SODIUM 40 MILLIGRAM(S): 20 TABLET, DELAYED RELEASE ORAL at 06:29

## 2019-05-16 RX ADMIN — AMLODIPINE BESYLATE 5 MILLIGRAM(S): 2.5 TABLET ORAL at 06:29

## 2019-05-16 RX ADMIN — SIMVASTATIN 20 MILLIGRAM(S): 20 TABLET, FILM COATED ORAL at 21:36

## 2019-05-16 RX ADMIN — CHLORHEXIDINE GLUCONATE 1 APPLICATION(S): 213 SOLUTION TOPICAL at 04:22

## 2019-05-16 RX ADMIN — HEPARIN SODIUM 15 UNIT(S)/HR: 5000 INJECTION INTRAVENOUS; SUBCUTANEOUS at 03:28

## 2019-05-16 RX ADMIN — MUPIROCIN 1 APPLICATION(S): 20 OINTMENT TOPICAL at 06:29

## 2019-05-16 NOTE — PROGRESS NOTE ADULT - SUBJECTIVE AND OBJECTIVE BOX
Cardiac Surgery Pre-op Note:    CC: Patient is a 68y old  Male who presents with a chief complaint of CP (15 May 2019 16:02)      Referring Physician: Julián	                                                                                                           Surgeon: Sean    Procedure:  CABG    Allergies    No Known Allergies    Intolerances        HPI:  This is a 68-year-old male who presents here for initial cardiac evaluation with concerns about mid-sternal chest discomfort. He has had these symptoms on and off since January. They typically occur when he is anxious or stressed and described as a pressure-like discomfort. It can last a variable period of time from a few minutes up to 15-20 minutes. There is usually no radiation.     He is not physically active, however, 10 days ago he climbed three flights of stairs and had the same exact symptoms of pressure-like discomfort with some arm numbness and symptoms again lasted about 15 minutes, this time in association with shortness of breath.     The patient has no preexisting history of cardiovascular disease. He does have risk factors which include:  1. Hypertension.  2. Hyperlipidemia.  3. A very strong family history (father with CABG and brother stents).    His home blood pressure typically runs about 135/85-90. He does not have any palpitations or dizziness. He does have obstructive sleep apnea but uses a CPAP for this. (15 May 2019 10:24)      Subjective Assessment: NAD lying in bed without complaints.    PAST MEDICAL & SURGICAL HISTORY:  CAD S/P percutaneous coronary angioplasty  GERD (gastroesophageal reflux disease)  SOB (shortness of breath)  Chest pain  BPH (benign prostatic hyperplasia)  Hyperlipidemia  Hypertension  H/O hernia repair      MEDICATIONS  (STANDING):  amLODIPine   Tablet 5 milliGRAM(s) Oral daily  ATENolol  Tablet 25 milliGRAM(s) Oral daily  cefuroxime  IVPB 1500 milliGRAM(s) IV Intermittent once  finasteride 5 milliGRAM(s) Oral daily  heparin  Infusion 1700 Unit(s)/Hr (15 mL/Hr) IV Continuous <Continuous>  mupirocin 2% Ointment 1 Application(s) Topical two times a day  pantoprazole    Tablet 40 milliGRAM(s) Oral before breakfast  simvastatin 20 milliGRAM(s) Oral at bedtime  vancomycin  IVPB 1000 milliGRAM(s) IV Intermittent once    MEDICATIONS  (PRN):  nitroglycerin     SubLingual 0.4 milliGRAM(s) SubLingual every 5 minutes PRN Chest Pain        Labs:                        14.1   7.2   )-----------( 192      ( 16 May 2019 06:20 )             42.2     05-15    141  |  104  |  17.0  ----------------------------<  93  3.7   |  23.0  |  0.79    Ca    9.2      15 May 2019 16:12  Mg     2.2     05-15    TPro  7.4  /  Alb  4.2  /  TBili  0.5  /  DBili  0.1  /  AST  19  /  ALT  17  /  AlkPhos  58  05-15    PT/INR - ( 15 May 2019 16:12 )   PT: 12.5 sec;   INR: 1.08 ratio         PTT - ( 16 May 2019 08:14 )  PTT:73.6 sec    Blood Type:   HGB A1C: Hemoglobin A1C, Whole Blood: 5.5 % (05-15 @ 16:13)    Prealbumin: Prealbumin, Serum: 24 mg/dL (05-15 @ 16:12)    Pro-BNP: Serum Pro-Brain Natriuretic Peptide: 214 pg/mL (05-15 @ 16:12)    Thyroid Panel:   MRSA: MRSA PCR Result.: NotDetec (05-15 @ 23:50)   / MSSA:   Urinalysis Basic - ( 15 May 2019 16:59 )    Color: Yellow / Appearance: Clear / S.010 / pH: x  Gluc: x / Ketone: Negative  / Bili: Negative / Urobili: Negative mg/dL   Blood: x / Protein: 15 mg/dL / Nitrite: Negative   Leuk Esterase: Negative / RBC: 0-2 /HPF / WBC 0-2   Sq Epi: x / Non Sq Epi: Negative / Bacteria: Negative        CXR: < from: Xray Chest 1 View- PORTABLE-Urgent (05.15.19 @ 19:52) >    FINDINGS:   The lungs  are clear.  No pleural abnormality is seen.    The heart and mediastinum are within normal limits.    Visualized osseous structures are intact.        IMPRESSION:   No evidence of active chest disease.    < end of copied text >      EKG: < from: 12 Lead ECG (19 @ 07:43) >    Diagnosis Line Sinus bradycardia  Otherwise normal ECG    < end of copied text >      Carotid Duplex:  < from: US Duplex Carotid Arteries Complete, Bilateral (05.15.19 @ 19:39) >    No evidence ofhemodynamically significant stenosis by velocity   measurements.    Measurement of carotid stenosis is based on velocity parameters that   correlate the residual internal carotid diameter with that of the more   distal vessel in accordance with a method such as the [place] American   Symptomatic Carotid Endarterectomy Trial (NASCET).     < end of copied text >      PFT's: normal spirometry      Echocardiogram:    < from: TTE Echo Complete w/Doppler (05.15.19 @ 22:23) >    Summary:   1. There is mild concentric left ventricular hypertrophy.   2. Normal global left ventricular systolic function.  3. Left ventricular ejection fraction, by visual estimation, is 55 to   60%.   4. Basal inferior segment is abnormal as described above.   5. Spectral Doppler shows impaired relaxation pattern of left   ventricular myocardial filling (Grade I diastolic dysfunction).   6. The left atrium is normal in size.   7. Thickening of the anterior and posterior mitral valve leaflets.   8. Mild mitral valve regurgitation.   9. Mild mitral annular calcification.  10. Adequate TR velocity was not obtained to accurately assess RVSP.    MD Irwin Electronically signed on 2019 at 8:29:18 AM    < end of copied text >    Cardiac catheterization: < from: Cardiac Cath Lab - Adult (05.15.19 @ 11:51) >  VENTRICLES: Global left ventricular function wasnormal. EF calculated by  contrast ventriculography was 60 %.  CORONARY VESSELS: The coronary circulation is right dominant.  LM:   --  LM: Normal.  LAD:   --  Proximal LAD: There was a 60 % stenosis. In a second lesion,  there was a 70 % stenosis.  --  Mid LAD: There was a 70 % stenosis.  --  D2: There was a 80 % stenosis.  CX:   --  Proximal circumflex: There was a 70 % stenosis.  RCA:   --  Proximal RCA: There was a 90 % stenosis.  --  Mid RCA: There was a 70 % stenosis.  COMPLICATIONS: No complications occurred during the cath lab visit.  DIAGNOSTIC IMPRESSIONS: Severe multivessel disease.  Normal LV function.    < end of copied text >      Vein Mapping: Pending    Gen: WN/WD NAD  Neuro: AAOx3, nonfocal  Pulm: CTA B/L  CV: RRR, S1S2  Abd: Soft, NT, ND +BS  Ext: No edema, + peripheral pulses      Pt has AICD/PPM [ ] Yes  [ x] No             Brand Name:  Pre-op Beta Blocker ordered within 24 hrs of surgery (CABG ONLY)?  [ ] Yes  [x ] No  If not, Why? BRADYCARDIA  Type & Cross  [x ] Yes  [ ] No  NPO after Midnight [xx ] Yes  [ ] No  Pre-op ABX ordered, to be taped on chart:  [ x] Yes  [ ] No     Hibiclens/Peridex ordered [x ] Yes  [ ] No  Intraop Items Ordered: PRBCs, CXR, SHAUNNA [ x]   Consent obtained, or blank in chart  [ ] Yes  [ ] No

## 2019-05-16 NOTE — PROGRESS NOTE ADULT - PROBLEM SELECTOR PLAN 2
Patient educated about groin site care, signs and symptoms of complication post procedure, and plan of care moving forward. Patient verbalized understanding with teach-back.

## 2019-05-16 NOTE — PROGRESS NOTE ADULT - ASSESSMENT
68y Male h/o GERD, stomach polyps, BPH, HLD, HTN, borderline DM, REJI on CPAP, hemorrhoids bilateral inguinal hernia repair with mesh x6 presents with midsternal chest discomfort, found to have multivessel coronary disease. Antiplatelets discontinued. P2Y12 low, surgery deferred until 5/17.

## 2019-05-16 NOTE — PROGRESS NOTE ADULT - ASSESSMENT
This is a 68-year-old male who presents here for initial cardiac evaluation with concerns about mid-sternal chest discomfort. He has had these symptoms on and off since January. They typically occur when he is anxious or stressed and described as a pressure-like discomfort. It can last a variable period of time from a few minutes up to 15-20 minutes. There is usually no radiation.     He is not physically active, however, 10 days ago he climbed three flights of stairs and had the same exact symptoms of pressure-like discomfort with some arm numbness and symptoms again lasted about 15 minutes, this time in association with shortness of breath.     The patient has no preexisting history of cardiovascular disease. He does have risk factors which include:  1. Hypertension.  2. Hyperlipidemia.  3. A very strong family history (father with CABG and brother stents).    His home blood pressure typically runs about 135/85-90. He does not have any palpitations or dizziness. He does have obstructive sleep apnea but uses a CPAP for this. (15 May 2019 10:24)    Pt is now s/p LHC which showed: LM normal, pLAD 60%, pLAD 70%, mLAD 70%, D2 80%, pLCX 70%, pRCA 90%, mRCA 70%. EF preserved. - Results consistent with TVD. Patient to have CABG 2nd case today with ERNST Estrada MD.

## 2019-05-16 NOTE — PROGRESS NOTE ADULT - SUBJECTIVE AND OBJECTIVE BOX
CC:  Patient is a 68y old  Male who presents with a chief complaint of CP (15 May 2019 16:02)    HPI:  This is a 68-year-old male who presents here for initial cardiac evaluation with concerns about mid-sternal chest discomfort. He has had these symptoms on and off since January. They typically occur when he is anxious or stressed and described as a pressure-like discomfort. It can last a variable period of time from a few minutes up to 15-20 minutes. There is usually no radiation.     He is not physically active, however, 10 days ago he climbed three flights of stairs and had the same exact symptoms of pressure-like discomfort with some arm numbness and symptoms again lasted about 15 minutes, this time in association with shortness of breath.     The patient has no preexisting history of cardiovascular disease. He does have risk factors which include:  1. Hypertension.  2. Hyperlipidemia.  3. A very strong family history (father with CABG and brother stents).    His home blood pressure typically runs about 135/85-90. He does not have any palpitations or dizziness. He does have obstructive sleep apnea but uses a CPAP for this. (15 May 2019 10:24)    ROS: All review of systems negative unless indicated otherwise below.     Lab Results:  CBC  CBC Full  -  ( 16 May 2019 06:20 )  WBC Count : 7.2 K/uL  RBC Count : 5.09 M/uL  Hemoglobin : 14.1 g/dL  Hematocrit : 42.2 %  Platelet Count - Automated : 192 K/uL  Mean Cell Volume : 82.9 fl  Mean Cell Hemoglobin : 27.7 pg  Mean Cell Hemoglobin Concentration : 33.4 g/dL  Auto Neutrophil # : x  Auto Lymphocyte # : x  Auto Monocyte # : x  Auto Eosinophil # : x  Auto Basophil # : x  Auto Neutrophil % : x  Auto Lymphocyte % : x  Auto Monocyte % : x  Auto Eosinophil % : x  Auto Basophil % : x    .		Differential:	[] Automated		[] Manual  Chemistry                        14.1   7.2   )-----------( 192      ( 16 May 2019 06:20 )             42.2     05-15    141  |  104  |  17.0  ----------------------------<  93  3.7   |  23.0  |  0.79    Ca    9.2      15 May 2019 16:12  Mg     2.2     05-15    TPro  7.4  /  Alb  4.2  /  TBili  0.5  /  DBili  0.1  /  AST  19  /  ALT  17  /  AlkPhos  58  05-15    LIVER FUNCTIONS - ( 15 May 2019 16:12 )  Alb: 4.2 g/dL / Pro: 7.4 g/dL / ALK PHOS: 58 U/L / ALT: 17 U/L / AST: 19 U/L / GGT: x           PT/INR - ( 15 May 2019 16:12 )   PT: 12.5 sec;   INR: 1.08 ratio         PTT - ( 16 May 2019 08:14 )  PTT:73.6 sec  Urinalysis Basic - ( 15 May 2019 16:59 )    Color: Yellow / Appearance: Clear / S.010 / pH: x  Gluc: x / Ketone: Negative  / Bili: Negative / Urobili: Negative mg/dL   Blood: x / Protein: 15 mg/dL / Nitrite: Negative   Leuk Esterase: Negative / RBC: 0-2 /HPF / WBC 0-2   Sq Epi: x / Non Sq Epi: Negative / Bacteria: Negative    CATH REPORT:   < from: Cardiac Cath Lab - Adult (05.15.19 @ 11:51) >  VENTRICLES: Global left ventricular function wasnormal. EF calculated by  contrast ventriculography was 60 %.  CORONARY VESSELS: The coronary circulation is right dominant.  LM:   --  LM: Normal.  LAD:   --  Proximal LAD: There was a 60 % stenosis. In a second lesion,  there was a 70 % stenosis.  --  Mid LAD: There was a 70 % stenosis.  --  D2: There was a 80 % stenosis.  CX:   --  Proximal circumflex: There was a 70 % stenosis.  RCA:   --  Proximal RCA: There was a 90 % stenosis.  --  Mid RCA: There was a 70 % stenosis.  COMPLICATIONS: No complications occurred during the cath lab visit.  DIAGNOSTIC IMPRESSIONS: Severe multivessel disease.  Normal LV function.  DIAGNOSTIC RECOMMENDATIONS: CABG referral.  Prepared and signed by  López Gallego MD    < end of copied text >    MEDICATIONS  (STANDING):  amLODIPine   Tablet 5 milliGRAM(s) Oral daily  ATENolol  Tablet 25 milliGRAM(s) Oral daily  cefuroxime  IVPB 1500 milliGRAM(s) IV Intermittent once  finasteride 5 milliGRAM(s) Oral daily  heparin  Infusion 1700 Unit(s)/Hr IV Continuous <Continuous>  mupirocin 2% Ointment 1 Application(s) Topical two times a day  pantoprazole    Tablet 40 milliGRAM(s) Oral before breakfast  simvastatin 20 milliGRAM(s) Oral at bedtime  vancomycin  IVPB 1000 milliGRAM(s) IV Intermittent once    MEDICATIONS  (PRN):  nitroglycerin     SubLingual 0.4 milliGRAM(s) SubLingual every 5 minutes PRN Chest Pain    PHYSICAL EXAM:  Vital Signs Last 24 Hrs  T(C): 36.8 (16 May 2019 06:26), Max: 37.2 (15 May 2019 10:26)  T(F): 98.2 (16 May 2019 06:26), Max: 99 (15 May 2019 10:26)  HR: 46 (16 May 2019 06:26) (46 - 60)  BP: 135/70 (16 May 2019 06:26) (124/71 - 160/91)  BP(mean): --  RR: 18 (16 May 2019 06:26) (16 - 19)  SpO2: 96% (16 May 2019 06:26) (95% - 99%)    GENERAL: NAD, well-groomed, well-developed  HEAD:  Atraumatic, Normocephalic  NECK: Supple, No JVD  NERVOUS SYSTEM:  Alert & Oriented X3, Good concentration; Motor Strength 5/5 B/L upper and lower extremities, sensation intact  CHEST/LUNG: Clear to auscultation bilaterally, No rales, rhonchi, wheezing, or rubs  HEART: Regular rate and rhythm; s1 and s2 auscultated, No murmurs, rubs, or gallops  ABDOMEN: Soft, Nontender, Nondistended; Bowel sounds present and normoactive.   EXTREMITIES:  2+ Peripheral Pulses, No clubbing, cyanosis, or edema  SKIN: No rashes or lesions  CATH SITE: Right groin benign s/p cath.  No bleeding, no ecchymosis, no hematoma. Extremity Warm to touch, with palpable distal pulses, and brisk capillary refill.

## 2019-05-17 ENCOUNTER — TRANSCRIPTION ENCOUNTER (OUTPATIENT)
Age: 69
End: 2019-05-17

## 2019-05-17 DIAGNOSIS — I25.10 ATHEROSCLEROTIC HEART DISEASE OF NATIVE CORONARY ARTERY W/OUT ANGINA PECTORIS: ICD-10-CM

## 2019-05-17 LAB
BLD GP AB SCN SERPL QL: SIGNIFICANT CHANGE UP
CK SERPL-CCNC: 100 U/L — SIGNIFICANT CHANGE UP (ref 30–200)
HCT VFR BLD CALC: 42.7 % — SIGNIFICANT CHANGE UP (ref 42–52)
HGB BLD-MCNC: 13.9 G/DL — LOW (ref 14–18)
MCHC RBC-ENTMCNC: 27.6 PG — SIGNIFICANT CHANGE UP (ref 27–31)
MCHC RBC-ENTMCNC: 32.6 G/DL — SIGNIFICANT CHANGE UP (ref 32–36)
MCV RBC AUTO: 84.7 FL — SIGNIFICANT CHANGE UP (ref 80–94)
PA ADP PRP-ACNC: 196 PRU — SIGNIFICANT CHANGE UP (ref 180–376)
PLATELET # BLD AUTO: 177 K/UL — SIGNIFICANT CHANGE UP (ref 150–400)
RBC # BLD: 5.04 M/UL — SIGNIFICANT CHANGE UP (ref 4.6–6.2)
RBC # FLD: 13.8 % — SIGNIFICANT CHANGE UP (ref 11–15.6)
T3 SERPL-MCNC: 134 NG/DL — SIGNIFICANT CHANGE UP (ref 80–200)
T4 FREE SERPL-MCNC: 1.3 NG/DL — SIGNIFICANT CHANGE UP (ref 0.9–1.8)
TROPONIN T SERPL-MCNC: <0.01 NG/ML — SIGNIFICANT CHANGE UP (ref 0–0.06)
TSH SERPL-MCNC: 1.04 UIU/ML — SIGNIFICANT CHANGE UP (ref 0.27–4.2)
TYPE + AB SCN PNL BLD: SIGNIFICANT CHANGE UP
WBC # BLD: 6.3 K/UL — SIGNIFICANT CHANGE UP (ref 4.8–10.8)
WBC # FLD AUTO: 6.3 K/UL — SIGNIFICANT CHANGE UP (ref 4.8–10.8)

## 2019-05-17 PROCEDURE — 93010 ELECTROCARDIOGRAM REPORT: CPT

## 2019-05-17 RX ORDER — ALPRAZOLAM 0.25 MG
0.25 TABLET ORAL EVERY 8 HOURS
Refills: 0 | Status: DISCONTINUED | OUTPATIENT
Start: 2019-05-17 | End: 2019-05-18

## 2019-05-17 RX ORDER — CHLORHEXIDINE GLUCONATE 213 G/1000ML
1 SOLUTION TOPICAL
Refills: 0 | Status: DISCONTINUED | OUTPATIENT
Start: 2019-05-17 | End: 2019-05-18

## 2019-05-17 RX ORDER — KIT FOR THE PREPARATION OF TECHNETIUM TC99M SESTAMIBI 1 MG/5ML
INJECTION, POWDER, LYOPHILIZED, FOR SOLUTION PARENTERAL
Refills: 0 | Status: COMPLETED | OUTPATIENT
Start: 2019-05-17

## 2019-05-17 RX ORDER — CHLORHEXIDINE GLUCONATE 213 G/1000ML
15 SOLUTION TOPICAL
Refills: 0 | Status: DISCONTINUED | OUTPATIENT
Start: 2019-05-17 | End: 2019-05-18

## 2019-05-17 RX ADMIN — FINASTERIDE 5 MILLIGRAM(S): 5 TABLET, FILM COATED ORAL at 21:24

## 2019-05-17 RX ADMIN — CHLORHEXIDINE GLUCONATE 1 APPLICATION(S): 213 SOLUTION TOPICAL at 22:50

## 2019-05-17 RX ADMIN — AMLODIPINE BESYLATE 5 MILLIGRAM(S): 2.5 TABLET ORAL at 05:34

## 2019-05-17 RX ADMIN — Medication 0.25 MILLIGRAM(S): at 21:24

## 2019-05-17 RX ADMIN — KIT FOR THE PREPARATION OF TECHNETIUM TC99M SESTAMIBI 0: 1 INJECTION, POWDER, LYOPHILIZED, FOR SOLUTION PARENTERAL at 00:00

## 2019-05-17 RX ADMIN — SIMVASTATIN 20 MILLIGRAM(S): 20 TABLET, FILM COATED ORAL at 21:24

## 2019-05-17 RX ADMIN — PANTOPRAZOLE SODIUM 40 MILLIGRAM(S): 20 TABLET, DELAYED RELEASE ORAL at 05:34

## 2019-05-17 RX ADMIN — MUPIROCIN 1 APPLICATION(S): 20 OINTMENT TOPICAL at 05:36

## 2019-05-17 RX ADMIN — CHLORHEXIDINE GLUCONATE 15 MILLILITER(S): 213 SOLUTION TOPICAL at 17:17

## 2019-05-17 RX ADMIN — ATENOLOL 25 MILLIGRAM(S): 25 TABLET ORAL at 05:34

## 2019-05-17 RX ADMIN — MUPIROCIN 1 APPLICATION(S): 20 OINTMENT TOPICAL at 17:18

## 2019-05-17 NOTE — PROGRESS NOTE ADULT - ASSESSMENT
68y Male h/o GERD, stomach polyps, BPH, HLD, HTN, borderline DM, REJI on CPAP, hemorrhoids bilateral inguinal hernia repair with mesh x6 presents with midsternal chest discomfort, found to have multivessel coronary disease. Antiplatelets discontinued. P2Y12 low, surgery deferred until 5/17. Emergency surgery 5/17, case cancelled, moved to AM 5/18. P2Y12 196.

## 2019-05-17 NOTE — PROGRESS NOTE ADULT - SUBJECTIVE AND OBJECTIVE BOX
Cardiac Surgery Pre-op Note:    CC: Patient is a 68y old  Male who presents with a chief complaint of CP (15 May 2019 16:02)      Referring Physician: Obie                                                                                                           Surgeon: Sean    Procedure:  CABG    Allergies    No Known Allergies    Intolerances        HPI:  This is a 68-year-old male who presents here for initial cardiac evaluation with concerns about mid-sternal chest discomfort. He has had these symptoms on and off since January. They typically occur when he is anxious or stressed and described as a pressure-like discomfort. It can last a variable period of time from a few minutes up to 15-20 minutes. There is usually no radiation.     He is not physically active, however, 10 days ago he climbed three flights of stairs and had the same exact symptoms of pressure-like discomfort with some arm numbness and symptoms again lasted about 15 minutes, this time in association with shortness of breath.     The patient has no preexisting history of cardiovascular disease. He does have risk factors which include:  1. Hypertension.  2. Hyperlipidemia.  3. A very strong family history (father with CABG and brother stents).    His home blood pressure typically runs about 135/85-90. He does not have any palpitations or dizziness. He does have obstructive sleep apnea but uses a CPAP for this. (15 May 2019 10:24)      Subjective Assessment: "My groin hurts" NAD, ambulating ad nicholas    PAST MEDICAL & SURGICAL HISTORY:  CAD S/P percutaneous coronary angioplasty  GERD (gastroesophageal reflux disease)  SOB (shortness of breath)  Chest pain  BPH (benign prostatic hyperplasia)  Hyperlipidemia  Hypertension  H/O hernia repair      MEDICATIONS  (STANDING):  amLODIPine   Tablet 5 milliGRAM(s) Oral daily  ATENolol  Tablet 25 milliGRAM(s) Oral daily  cefuroxime  IVPB 1500 milliGRAM(s) IV Intermittent once  finasteride 5 milliGRAM(s) Oral daily  heparin  Infusion 1700 Unit(s)/Hr (15 mL/Hr) IV Continuous <Continuous>  mupirocin 2% Ointment 1 Application(s) Topical two times a day  pantoprazole    Tablet 40 milliGRAM(s) Oral before breakfast  simvastatin 20 milliGRAM(s) Oral at bedtime  vancomycin  IVPB 1000 milliGRAM(s) IV Intermittent once    MEDICATIONS  (PRN):  nitroglycerin     SubLingual 0.4 milliGRAM(s) SubLingual every 5 minutes PRN Chest Pain        Labs:                        13.9   6.3   )-----------( 177      ( 17 May 2019 06:16 )             42.7           PTT - ( 16 May 2019 08:14 )  PTT:73.6 sec    Blood Type: A POS  HGB A1C: Hemoglobin A1C, Whole Blood: 5.5 % (05-15 @ 16:13)    Prealbumin: Prealbumin, Serum: 24 mg/dL (05-15 @ 16:12)    Pro-BNP: Serum Pro-Brain Natriuretic Peptide: 214 pg/mL (05-15 @ 16:12)    Thyroid Panel:  @ 06:16/1.04  --/--/134    MRSA: MRSA PCR Result.: NotDetec (05-15 @ 23:50)   / MSSA: Detected > on bactroban  Urinalysis Basic - ( 15 May 2019 16:59 )    Color: Yellow / Appearance: Clear / S.010 / pH: x  Gluc: x / Ketone: Negative  / Bili: Negative / Urobili: Negative mg/dL   Blood: x / Protein: 15 mg/dL / Nitrite: Negative   Leuk Esterase: Negative / RBC: 0-2 /HPF / WBC 0-2   Sq Epi: x / Non Sq Epi: Negative / Bacteria: Negative        CXR:   < from: Xray Chest 1 View- PORTABLE-Urgent (05.15.19 @ 19:52) >    FINDINGS:   The lungs  are clear.  No pleural abnormality is seen.    The heart and mediastinum are within normal limits.    Visualized osseous structures are intact.        IMPRESSION:   No evidence of active chest disease.    < end of copied text >    EKG:   < from: 12 Lead ECG (19 @ 07:43) >    Diagnosis Line Sinus bradycardia  Otherwise normal ECG    < end of copied text >    Carotid Duplex:  < from: US Duplex Carotid Arteries Complete, Bilateral (05.15.19 @ 19:39) >    IMPRESSION:    No evidence ofhemodynamically significant stenosis by velocity   measurements.    < end of copied text >      PFT's: normal spirometry    Echocardiogram: < from: TTE Echo Complete w/Doppler (05.15.19 @ 22:23) >    PHYSICIAN INTERPRETATION:  Left Ventricle: The left ventricular internal cavity size is normal.  Global LV systolic function was normal. Left ventricular ejection   fraction, by visual estimation, is 55 to 60%. Spectral Doppler shows   impaired relaxation pattern of left ventricular myocardial filling (Grade   I diastolic dysfunction).       LV Wall Scoring:  The basal inferior segment is hypokinetic.    Right Ventricle: The right ventricular size is mildly enlarged. TV S' 0.1   m/s.  Left Atrium: The left atrium is normal in size.  Right Atrium: Mildly enlarged right atrium.  Pericardium: There is no evidence of pericardial effusion.  Mitral Valve: Thickening of the anterior and posterior mitral valve   leaflets. There is mild mitral annular calcification. Mild mitral valve   regurgitation is seen.  Tricuspid Valve: The tricuspid valve is normal in structure. Mild   tricuspid regurgitation is visualized. Adequate TR velocity was not   obtained to accurately assess RVSP.  Aortic Valve: The aortic valve is trileaflet. No evidence of aortic valve   regurgitation is seen.  Pulmonic Valve: The pulmonic valve was not well visualized. The pulmonic   valve is normal. Mild pulmonic valve regurgitation.  Aorta: The aortic root and ascending aorta are structurally normal, with   no evidence of dilitation.  Pulmonary Artery: The pulmonary artery is of normal size and origin.  Venous: A normal flow pattern is recorded from the right upper pulmonary   vein. The inferior vena cava is normal. The inferior vena cava was normal   sized, with respiratory size variation greater than 50%.  In comparison to the previous echocardiogram(s): There are no prior   studies on this patient for comparison purposes.       < end of copied text >      Cardiac catheterization: < from: Cardiac Cath Lab - Adult (05.15.19 @ 11:51) >  VENTRICLES: Global left ventricular function wasnormal. EF calculated by  contrast ventriculography was 60 %.  CORONARY VESSELS: The coronary circulation is right dominant.  LM:   --  LM: Normal.  LAD:   --  Proximal LAD: There was a 60 % stenosis. In a second lesion,  there was a 70 % stenosis.  --  Mid LAD: There was a 70 % stenosis.  --  D2: There was a 80 % stenosis.  CX:   --  Proximal circumflex: There was a 70 % stenosis.  RCA:   --  Proximal RCA: There was a 90 % stenosis.  --  Mid RCA: There was a 70 % stenosis.  COMPLICATIONS: No complications occurred during the cath lab visit.  DIAGNOSTIC IMPRESSIONS: Severe multivessel disease.  Normal LV function.    < end of copied text >      Vein Mapping:    Gen: WN/WD NAD  Neuro: AAOx3, nonfocal  Pulm: CTA B/L  CV: RRR, S1S2  Abd: Soft, NT, ND +BS  Ext: No edema, + peripheral pulses      Pt has AICD/PPM [ ] Yes  [x ] No             Brand Name:  Pre-op Beta Blocker ordered within 24 hrs of surgery (CABG ONLY)?  [ x] Yes  [ ] No  If not, Why?  Type & Cross  [x ] Yes  [ ] No  NPO after Midnight [x ] Yes  [ ] No  Pre-op ABX ordered, to be taped on chart:  [x ] Yes  [ ] No     Hibiclens/Peridex ordered [x ] Yes  [ ] No  Intraop Items Ordered: PRBCs, CXR, SHAUNNA [x ]   Consent obtained, or blank in chart  [ ] Yes  [ x] No

## 2019-05-18 ENCOUNTER — APPOINTMENT (OUTPATIENT)
Dept: CARDIOTHORACIC SURGERY | Facility: HOSPITAL | Age: 69
End: 2019-05-18

## 2019-05-18 LAB
ALBUMIN SERPL ELPH-MCNC: 3.3 G/DL — SIGNIFICANT CHANGE UP (ref 3.3–5.2)
ALP SERPL-CCNC: 45 U/L — SIGNIFICANT CHANGE UP (ref 40–120)
ALT FLD-CCNC: 25 U/L — SIGNIFICANT CHANGE UP
ANION GAP SERPL CALC-SCNC: 13 MMOL/L — SIGNIFICANT CHANGE UP (ref 5–17)
APTT BLD: 26.6 SEC — LOW (ref 27.5–36.3)
AST SERPL-CCNC: 38 U/L — SIGNIFICANT CHANGE UP
BILIRUB SERPL-MCNC: 0.8 MG/DL — SIGNIFICANT CHANGE UP (ref 0.4–2)
BUN SERPL-MCNC: 14 MG/DL — SIGNIFICANT CHANGE UP (ref 8–20)
CALCIUM SERPL-MCNC: 7.9 MG/DL — LOW (ref 8.6–10.2)
CHLORIDE SERPL-SCNC: 108 MMOL/L — HIGH (ref 98–107)
CK MB CFR SERPL CALC: 13.7 NG/ML — HIGH (ref 0–6.7)
CK SERPL-CCNC: 206 U/L — HIGH (ref 30–200)
CO2 SERPL-SCNC: 21 MMOL/L — LOW (ref 22–29)
CREAT SERPL-MCNC: 0.68 MG/DL — SIGNIFICANT CHANGE UP (ref 0.5–1.3)
EOSINOPHIL # BLD AUTO: 0 K/UL — SIGNIFICANT CHANGE UP (ref 0–0.5)
EOSINOPHIL NFR BLD AUTO: 0.2 % — SIGNIFICANT CHANGE UP (ref 0–5)
FIBRINOGEN PPP-MCNC: 409 MG/DL — SIGNIFICANT CHANGE UP (ref 350–510)
GAS PNL BLDA: SIGNIFICANT CHANGE UP
GLUCOSE BLDC GLUCOMTR-MCNC: 114 MG/DL — HIGH (ref 70–99)
GLUCOSE BLDC GLUCOMTR-MCNC: 115 MG/DL — HIGH (ref 70–99)
GLUCOSE BLDC GLUCOMTR-MCNC: 121 MG/DL — HIGH (ref 70–99)
GLUCOSE BLDC GLUCOMTR-MCNC: 124 MG/DL — HIGH (ref 70–99)
GLUCOSE BLDC GLUCOMTR-MCNC: 128 MG/DL — HIGH (ref 70–99)
GLUCOSE BLDC GLUCOMTR-MCNC: 132 MG/DL — HIGH (ref 70–99)
GLUCOSE BLDC GLUCOMTR-MCNC: 134 MG/DL — HIGH (ref 70–99)
GLUCOSE BLDC GLUCOMTR-MCNC: 136 MG/DL — HIGH (ref 70–99)
GLUCOSE BLDC GLUCOMTR-MCNC: 137 MG/DL — HIGH (ref 70–99)
GLUCOSE SERPL-MCNC: 149 MG/DL — HIGH (ref 70–115)
HCT VFR BLD CALC: 38.2 % — LOW (ref 42–52)
HCT VFR BLD CALC: 41.1 % — LOW (ref 42–52)
HGB BLD-MCNC: 12.6 G/DL — LOW (ref 14–18)
HGB BLD-MCNC: 13.6 G/DL — LOW (ref 14–18)
INR BLD: 1.19 RATIO — HIGH (ref 0.88–1.16)
LYMPHOCYTES # BLD AUTO: 0.8 K/UL — LOW (ref 1–4.8)
LYMPHOCYTES # BLD AUTO: 6.4 % — LOW (ref 20–55)
MAGNESIUM SERPL-MCNC: 2.5 MG/DL — SIGNIFICANT CHANGE UP (ref 1.8–2.6)
MCHC RBC-ENTMCNC: 27.6 PG — SIGNIFICANT CHANGE UP (ref 27–31)
MCHC RBC-ENTMCNC: 28.2 PG — SIGNIFICANT CHANGE UP (ref 27–31)
MCHC RBC-ENTMCNC: 33 G/DL — SIGNIFICANT CHANGE UP (ref 32–36)
MCHC RBC-ENTMCNC: 33.1 G/DL — SIGNIFICANT CHANGE UP (ref 32–36)
MCV RBC AUTO: 83.8 FL — SIGNIFICANT CHANGE UP (ref 80–94)
MCV RBC AUTO: 85.1 FL — SIGNIFICANT CHANGE UP (ref 80–94)
MONOCYTES # BLD AUTO: 0.5 K/UL — SIGNIFICANT CHANGE UP (ref 0–0.8)
MONOCYTES NFR BLD AUTO: 3.8 % — SIGNIFICANT CHANGE UP (ref 3–10)
NEUTROPHILS # BLD AUTO: 11.8 K/UL — HIGH (ref 1.8–8)
NEUTROPHILS NFR BLD AUTO: 89.2 % — HIGH (ref 37–73)
PA ADP PRP-ACNC: 167 PRU — LOW (ref 180–376)
PHOSPHATE SERPL-MCNC: 1.5 MG/DL — LOW (ref 2.4–4.7)
PLATELET # BLD AUTO: 161 K/UL — SIGNIFICANT CHANGE UP (ref 150–400)
PLATELET # BLD AUTO: 165 K/UL — SIGNIFICANT CHANGE UP (ref 150–400)
POTASSIUM SERPL-MCNC: 3.4 MMOL/L — LOW (ref 3.5–5.3)
POTASSIUM SERPL-SCNC: 3.4 MMOL/L — LOW (ref 3.5–5.3)
PROT SERPL-MCNC: 5.6 G/DL — LOW (ref 6.6–8.7)
PROTHROM AB SERPL-ACNC: 13.8 SEC — HIGH (ref 10–12.9)
RBC # BLD: 4.56 M/UL — LOW (ref 4.6–6.2)
RBC # BLD: 4.83 M/UL — SIGNIFICANT CHANGE UP (ref 4.6–6.2)
RBC # FLD: 13.6 % — SIGNIFICANT CHANGE UP (ref 11–15.6)
RBC # FLD: 13.9 % — SIGNIFICANT CHANGE UP (ref 11–15.6)
SODIUM SERPL-SCNC: 142 MMOL/L — SIGNIFICANT CHANGE UP (ref 135–145)
TROPONIN T SERPL-MCNC: 0.19 NG/ML — HIGH (ref 0–0.06)
WBC # BLD: 13.2 K/UL — HIGH (ref 4.8–10.8)
WBC # BLD: 6.7 K/UL — SIGNIFICANT CHANGE UP (ref 4.8–10.8)
WBC # FLD AUTO: 13.2 K/UL — HIGH (ref 4.8–10.8)
WBC # FLD AUTO: 6.7 K/UL — SIGNIFICANT CHANGE UP (ref 4.8–10.8)

## 2019-05-18 PROCEDURE — 71045 X-RAY EXAM CHEST 1 VIEW: CPT | Mod: 26

## 2019-05-18 PROCEDURE — 33518 CABG ARTERY-VEIN TWO: CPT

## 2019-05-18 PROCEDURE — 33518 CABG ARTERY-VEIN TWO: CPT | Mod: AS

## 2019-05-18 PROCEDURE — 93010 ELECTROCARDIOGRAM REPORT: CPT

## 2019-05-18 PROCEDURE — 33533 CABG ARTERIAL SINGLE: CPT | Mod: AS

## 2019-05-18 PROCEDURE — 33533 CABG ARTERIAL SINGLE: CPT

## 2019-05-18 RX ORDER — SODIUM CHLORIDE 9 MG/ML
1000 INJECTION INTRAMUSCULAR; INTRAVENOUS; SUBCUTANEOUS
Refills: 0 | Status: DISCONTINUED | OUTPATIENT
Start: 2019-05-18 | End: 2019-05-20

## 2019-05-18 RX ORDER — CLOPIDOGREL BISULFATE 75 MG/1
75 TABLET, FILM COATED ORAL DAILY
Refills: 0 | Status: DISCONTINUED | OUTPATIENT
Start: 2019-05-19 | End: 2019-05-24

## 2019-05-18 RX ORDER — DOCUSATE SODIUM 100 MG
100 CAPSULE ORAL THREE TIMES A DAY
Refills: 0 | Status: DISCONTINUED | OUTPATIENT
Start: 2019-05-18 | End: 2019-05-24

## 2019-05-18 RX ORDER — FINASTERIDE 5 MG/1
5 TABLET, FILM COATED ORAL DAILY
Refills: 0 | Status: DISCONTINUED | OUTPATIENT
Start: 2019-05-18 | End: 2019-05-24

## 2019-05-18 RX ORDER — DEXMEDETOMIDINE HYDROCHLORIDE IN 0.9% SODIUM CHLORIDE 4 UG/ML
0.4 INJECTION INTRAVENOUS
Qty: 200 | Refills: 0 | Status: DISCONTINUED | OUTPATIENT
Start: 2019-05-18 | End: 2019-05-19

## 2019-05-18 RX ORDER — POTASSIUM CHLORIDE 20 MEQ
20 PACKET (EA) ORAL ONCE
Refills: 0 | Status: COMPLETED | OUTPATIENT
Start: 2019-05-18 | End: 2019-05-18

## 2019-05-18 RX ORDER — CHLORHEXIDINE GLUCONATE 213 G/1000ML
1 SOLUTION TOPICAL DAILY
Refills: 0 | Status: DISCONTINUED | OUTPATIENT
Start: 2019-05-18 | End: 2019-05-20

## 2019-05-18 RX ORDER — DEXTROSE 50 % IN WATER 50 %
50 SYRINGE (ML) INTRAVENOUS
Refills: 0 | Status: DISCONTINUED | OUTPATIENT
Start: 2019-05-18 | End: 2019-05-19

## 2019-05-18 RX ORDER — SENNA PLUS 8.6 MG/1
2 TABLET ORAL AT BEDTIME
Refills: 0 | Status: DISCONTINUED | OUTPATIENT
Start: 2019-05-19 | End: 2019-05-24

## 2019-05-18 RX ORDER — POTASSIUM CHLORIDE 20 MEQ
10 PACKET (EA) ORAL
Refills: 0 | Status: COMPLETED | OUTPATIENT
Start: 2019-05-18 | End: 2019-05-18

## 2019-05-18 RX ORDER — CHLORHEXIDINE GLUCONATE 213 G/1000ML
5 SOLUTION TOPICAL EVERY 4 HOURS
Refills: 0 | Status: DISCONTINUED | OUTPATIENT
Start: 2019-05-18 | End: 2019-05-19

## 2019-05-18 RX ORDER — MAGNESIUM SULFATE 500 MG/ML
2 VIAL (ML) INJECTION ONCE
Refills: 0 | Status: COMPLETED | OUTPATIENT
Start: 2019-05-18 | End: 2019-05-18

## 2019-05-18 RX ORDER — ATORVASTATIN CALCIUM 80 MG/1
40 TABLET, FILM COATED ORAL AT BEDTIME
Refills: 0 | Status: DISCONTINUED | OUTPATIENT
Start: 2019-05-19 | End: 2019-05-24

## 2019-05-18 RX ORDER — POTASSIUM CHLORIDE 20 MEQ
10 PACKET (EA) ORAL
Refills: 0 | Status: DISCONTINUED | OUTPATIENT
Start: 2019-05-18 | End: 2019-05-19

## 2019-05-18 RX ORDER — DEXTROSE 50 % IN WATER 50 %
25 SYRINGE (ML) INTRAVENOUS
Refills: 0 | Status: DISCONTINUED | OUTPATIENT
Start: 2019-05-18 | End: 2019-05-19

## 2019-05-18 RX ORDER — HYDROMORPHONE HYDROCHLORIDE 2 MG/ML
1 INJECTION INTRAMUSCULAR; INTRAVENOUS; SUBCUTANEOUS ONCE
Refills: 0 | Status: DISCONTINUED | OUTPATIENT
Start: 2019-05-18 | End: 2019-05-18

## 2019-05-18 RX ORDER — FENTANYL CITRATE 50 UG/ML
50 INJECTION INTRAVENOUS ONCE
Refills: 0 | Status: DISCONTINUED | OUTPATIENT
Start: 2019-05-18 | End: 2019-05-18

## 2019-05-18 RX ORDER — MUPIROCIN 20 MG/G
1 OINTMENT TOPICAL
Refills: 0 | Status: COMPLETED | OUTPATIENT
Start: 2019-05-18 | End: 2019-05-21

## 2019-05-18 RX ORDER — ACETAMINOPHEN 500 MG
1000 TABLET ORAL ONCE
Refills: 0 | Status: COMPLETED | OUTPATIENT
Start: 2019-05-18 | End: 2019-05-18

## 2019-05-18 RX ORDER — ASPIRIN/CALCIUM CARB/MAGNESIUM 324 MG
325 TABLET ORAL ONCE
Refills: 0 | Status: COMPLETED | OUTPATIENT
Start: 2019-05-18 | End: 2019-05-18

## 2019-05-18 RX ORDER — CEFUROXIME AXETIL 250 MG
1500 TABLET ORAL EVERY 8 HOURS
Refills: 0 | Status: COMPLETED | OUTPATIENT
Start: 2019-05-18 | End: 2019-05-20

## 2019-05-18 RX ORDER — MEPERIDINE HYDROCHLORIDE 50 MG/ML
25 INJECTION INTRAMUSCULAR; INTRAVENOUS; SUBCUTANEOUS ONCE
Refills: 0 | Status: DISCONTINUED | OUTPATIENT
Start: 2019-05-18 | End: 2019-05-19

## 2019-05-18 RX ORDER — ASPIRIN/CALCIUM CARB/MAGNESIUM 324 MG
81 TABLET ORAL DAILY
Refills: 0 | Status: DISCONTINUED | OUTPATIENT
Start: 2019-05-18 | End: 2019-05-18

## 2019-05-18 RX ORDER — PANTOPRAZOLE SODIUM 20 MG/1
40 TABLET, DELAYED RELEASE ORAL DAILY
Refills: 0 | Status: DISCONTINUED | OUTPATIENT
Start: 2019-05-18 | End: 2019-05-18

## 2019-05-18 RX ORDER — NOREPINEPHRINE BITARTRATE/D5W 8 MG/250ML
0.05 PLASTIC BAG, INJECTION (ML) INTRAVENOUS
Qty: 8 | Refills: 0 | Status: DISCONTINUED | OUTPATIENT
Start: 2019-05-18 | End: 2019-05-19

## 2019-05-18 RX ORDER — INSULIN HUMAN 100 [IU]/ML
4 INJECTION, SOLUTION SUBCUTANEOUS
Qty: 50 | Refills: 0 | Status: DISCONTINUED | OUTPATIENT
Start: 2019-05-18 | End: 2019-05-19

## 2019-05-18 RX ORDER — ALBUMIN HUMAN 25 %
250 VIAL (ML) INTRAVENOUS ONCE
Refills: 0 | Status: COMPLETED | OUTPATIENT
Start: 2019-05-18 | End: 2019-05-18

## 2019-05-18 RX ORDER — VANCOMYCIN HCL 1 G
1000 VIAL (EA) INTRAVENOUS EVERY 12 HOURS
Refills: 0 | Status: COMPLETED | OUTPATIENT
Start: 2019-05-18 | End: 2019-05-20

## 2019-05-18 RX ORDER — PANTOPRAZOLE SODIUM 20 MG/1
40 TABLET, DELAYED RELEASE ORAL
Refills: 0 | Status: DISCONTINUED | OUTPATIENT
Start: 2019-05-19 | End: 2019-05-24

## 2019-05-18 RX ORDER — HYDROMORPHONE HYDROCHLORIDE 2 MG/ML
0.5 INJECTION INTRAMUSCULAR; INTRAVENOUS; SUBCUTANEOUS ONCE
Refills: 0 | Status: DISCONTINUED | OUTPATIENT
Start: 2019-05-18 | End: 2019-05-18

## 2019-05-18 RX ORDER — POLYETHYLENE GLYCOL 3350 17 G/17G
17 POWDER, FOR SOLUTION ORAL AT BEDTIME
Refills: 0 | Status: DISCONTINUED | OUTPATIENT
Start: 2019-05-19 | End: 2019-05-24

## 2019-05-18 RX ORDER — SODIUM CHLORIDE 9 MG/ML
500 INJECTION, SOLUTION INTRAVENOUS ONCE
Refills: 0 | Status: COMPLETED | OUTPATIENT
Start: 2019-05-18 | End: 2019-05-18

## 2019-05-18 RX ORDER — IPRATROPIUM/ALBUTEROL SULFATE 18-103MCG
3 AEROSOL WITH ADAPTER (GRAM) INHALATION ONCE
Refills: 0 | Status: COMPLETED | OUTPATIENT
Start: 2019-05-18 | End: 2019-05-18

## 2019-05-18 RX ORDER — ASPIRIN/CALCIUM CARB/MAGNESIUM 324 MG
325 TABLET ORAL DAILY
Refills: 0 | Status: DISCONTINUED | OUTPATIENT
Start: 2019-05-19 | End: 2019-05-24

## 2019-05-18 RX ORDER — CLOPIDOGREL BISULFATE 75 MG/1
75 TABLET, FILM COATED ORAL ONCE
Refills: 0 | Status: COMPLETED | OUTPATIENT
Start: 2019-05-18 | End: 2019-05-18

## 2019-05-18 RX ADMIN — SODIUM CHLORIDE 5 MILLILITER(S): 9 INJECTION INTRAMUSCULAR; INTRAVENOUS; SUBCUTANEOUS at 17:26

## 2019-05-18 RX ADMIN — PANTOPRAZOLE SODIUM 40 MILLIGRAM(S): 20 TABLET, DELAYED RELEASE ORAL at 06:21

## 2019-05-18 RX ADMIN — Medication 3 MILLILITER(S): at 17:15

## 2019-05-18 RX ADMIN — PANTOPRAZOLE SODIUM 40 MILLIGRAM(S): 20 TABLET, DELAYED RELEASE ORAL at 14:27

## 2019-05-18 RX ADMIN — DEXMEDETOMIDINE HYDROCHLORIDE IN 0.9% SODIUM CHLORIDE 9.21 MICROGRAM(S)/KG/HR: 4 INJECTION INTRAVENOUS at 18:14

## 2019-05-18 RX ADMIN — CHLORHEXIDINE GLUCONATE 15 MILLILITER(S): 213 SOLUTION TOPICAL at 06:22

## 2019-05-18 RX ADMIN — Medication 750 MILLILITER(S): at 19:30

## 2019-05-18 RX ADMIN — CHLORHEXIDINE GLUCONATE 5 MILLILITER(S): 213 SOLUTION TOPICAL at 21:12

## 2019-05-18 RX ADMIN — INSULIN HUMAN 4 UNIT(S)/HR: 100 INJECTION, SOLUTION SUBCUTANEOUS at 13:40

## 2019-05-18 RX ADMIN — HYDROMORPHONE HYDROCHLORIDE 1 MILLIGRAM(S): 2 INJECTION INTRAMUSCULAR; INTRAVENOUS; SUBCUTANEOUS at 16:50

## 2019-05-18 RX ADMIN — HYDROMORPHONE HYDROCHLORIDE 1 MILLIGRAM(S): 2 INJECTION INTRAMUSCULAR; INTRAVENOUS; SUBCUTANEOUS at 18:28

## 2019-05-18 RX ADMIN — Medication 250 MILLIGRAM(S): at 20:34

## 2019-05-18 RX ADMIN — Medication 325 MILLIGRAM(S): at 18:36

## 2019-05-18 RX ADMIN — HYDROMORPHONE HYDROCHLORIDE 0.5 MILLIGRAM(S): 2 INJECTION INTRAMUSCULAR; INTRAVENOUS; SUBCUTANEOUS at 22:23

## 2019-05-18 RX ADMIN — Medication 1000 MILLIGRAM(S): at 21:42

## 2019-05-18 RX ADMIN — Medication 100 MILLIEQUIVALENT(S): at 19:21

## 2019-05-18 RX ADMIN — CHLORHEXIDINE GLUCONATE 1 APPLICATION(S): 213 SOLUTION TOPICAL at 06:25

## 2019-05-18 RX ADMIN — Medication 100 MILLIEQUIVALENT(S): at 16:52

## 2019-05-18 RX ADMIN — AMLODIPINE BESYLATE 5 MILLIGRAM(S): 2.5 TABLET ORAL at 06:21

## 2019-05-18 RX ADMIN — Medication 100 MILLIEQUIVALENT(S): at 14:28

## 2019-05-18 RX ADMIN — CHLORHEXIDINE GLUCONATE 5 MILLILITER(S): 213 SOLUTION TOPICAL at 14:20

## 2019-05-18 RX ADMIN — Medication 100 MILLIEQUIVALENT(S): at 14:22

## 2019-05-18 RX ADMIN — Medication 100 MILLIGRAM(S): at 16:27

## 2019-05-18 RX ADMIN — SODIUM CHLORIDE 3000 MILLILITER(S): 9 INJECTION, SOLUTION INTRAVENOUS at 17:25

## 2019-05-18 RX ADMIN — Medication 100 MILLIEQUIVALENT(S): at 15:12

## 2019-05-18 RX ADMIN — MUPIROCIN 1 APPLICATION(S): 20 OINTMENT TOPICAL at 06:22

## 2019-05-18 RX ADMIN — FENTANYL CITRATE 50 MICROGRAM(S): 50 INJECTION INTRAVENOUS at 14:17

## 2019-05-18 RX ADMIN — HYDROMORPHONE HYDROCHLORIDE 1 MILLIGRAM(S): 2 INJECTION INTRAMUSCULAR; INTRAVENOUS; SUBCUTANEOUS at 18:13

## 2019-05-18 RX ADMIN — Medication 100 MILLIEQUIVALENT(S): at 20:34

## 2019-05-18 RX ADMIN — MUPIROCIN 1 APPLICATION(S): 20 OINTMENT TOPICAL at 21:19

## 2019-05-18 RX ADMIN — CHLORHEXIDINE GLUCONATE 5 MILLILITER(S): 213 SOLUTION TOPICAL at 17:25

## 2019-05-18 RX ADMIN — Medication 100 MILLIEQUIVALENT(S): at 18:11

## 2019-05-18 RX ADMIN — Medication 100 MILLIEQUIVALENT(S): at 17:26

## 2019-05-18 RX ADMIN — Medication 100 MILLIEQUIVALENT(S): at 15:10

## 2019-05-18 RX ADMIN — FENTANYL CITRATE 50 MICROGRAM(S): 50 INJECTION INTRAVENOUS at 14:32

## 2019-05-18 RX ADMIN — HYDROMORPHONE HYDROCHLORIDE 0.5 MILLIGRAM(S): 2 INJECTION INTRAMUSCULAR; INTRAVENOUS; SUBCUTANEOUS at 22:40

## 2019-05-18 RX ADMIN — Medication 50 GRAM(S): at 15:06

## 2019-05-18 RX ADMIN — SODIUM CHLORIDE 10 MILLILITER(S): 9 INJECTION INTRAMUSCULAR; INTRAVENOUS; SUBCUTANEOUS at 15:12

## 2019-05-18 RX ADMIN — Medication 400 MILLIGRAM(S): at 21:12

## 2019-05-18 RX ADMIN — Medication 8.63 MICROGRAM(S)/KG/MIN: at 13:38

## 2019-05-18 RX ADMIN — CLOPIDOGREL BISULFATE 75 MILLIGRAM(S): 75 TABLET, FILM COATED ORAL at 18:36

## 2019-05-18 RX ADMIN — CHLORHEXIDINE GLUCONATE 1 APPLICATION(S): 213 SOLUTION TOPICAL at 20:34

## 2019-05-18 RX ADMIN — HYDROMORPHONE HYDROCHLORIDE 1 MILLIGRAM(S): 2 INJECTION INTRAMUSCULAR; INTRAVENOUS; SUBCUTANEOUS at 16:36

## 2019-05-18 NOTE — BRIEF OPERATIVE NOTE - NSICDXBRIEFPROCEDURE_GEN_ALL_CORE_FT
PROCEDURES:  Blackwell, vein, endoscopic 18-May-2019 09:25:29 right leg greater saphenous vein harvest    Mallory Magallon  CABG, with SHAUNNA 18-May-2019 09:25:11 LIMA-LAD, SVG-OM, SVG-Ramus    Mallory Magallon

## 2019-05-18 NOTE — BRIEF OPERATIVE NOTE - COMMENTS
patient tx to [facility]ICU   Gtts: levophed & insulin   Blood products given intraop: null   Invasive lines placed: RENEE slic, r radial   Indwelling catheters: guerrero   Pacing wires: none   Chest tubes: 1 left 1 MS     No qualified resident available to perform operation

## 2019-05-18 NOTE — BRIEF OPERATIVE NOTE - IV INFUSIONS - BLOOD PRODUCTS
unable to estimate true blood loss secondary to utlization of the cell saver unable to estimate true blood loss secondary to utilization of the cell saver

## 2019-05-18 NOTE — PROCEDURE NOTE - NSPROCDETAILS_GEN_ALL_CORE
sterile dressing applied/guidewire recovered/lumen(s) aspirated and flushed/ultrasound guidance/sterile technique, catheter placed

## 2019-05-19 DIAGNOSIS — I25.10 ATHEROSCLEROTIC HEART DISEASE OF NATIVE CORONARY ARTERY WITHOUT ANGINA PECTORIS: ICD-10-CM

## 2019-05-19 DIAGNOSIS — Z29.9 ENCOUNTER FOR PROPHYLACTIC MEASURES, UNSPECIFIED: ICD-10-CM

## 2019-05-19 DIAGNOSIS — N40.0 BENIGN PROSTATIC HYPERPLASIA WITHOUT LOWER URINARY TRACT SYMPTOMS: ICD-10-CM

## 2019-05-19 LAB
ALBUMIN SERPL ELPH-MCNC: 3.4 G/DL — SIGNIFICANT CHANGE UP (ref 3.3–5.2)
ALP SERPL-CCNC: 40 U/L — SIGNIFICANT CHANGE UP (ref 40–120)
ALT FLD-CCNC: 27 U/L — SIGNIFICANT CHANGE UP
ANION GAP SERPL CALC-SCNC: 13 MMOL/L — SIGNIFICANT CHANGE UP (ref 5–17)
ANION GAP SERPL CALC-SCNC: 9 MMOL/L — SIGNIFICANT CHANGE UP (ref 5–17)
AST SERPL-CCNC: 39 U/L — SIGNIFICANT CHANGE UP
BILIRUB SERPL-MCNC: 0.4 MG/DL — SIGNIFICANT CHANGE UP (ref 0.4–2)
BUN SERPL-MCNC: 13 MG/DL — SIGNIFICANT CHANGE UP (ref 8–20)
BUN SERPL-MCNC: 18 MG/DL — SIGNIFICANT CHANGE UP (ref 8–20)
CALCIUM SERPL-MCNC: 8 MG/DL — LOW (ref 8.6–10.2)
CALCIUM SERPL-MCNC: 8.3 MG/DL — LOW (ref 8.6–10.2)
CHLORIDE SERPL-SCNC: 105 MMOL/L — SIGNIFICANT CHANGE UP (ref 98–107)
CHLORIDE SERPL-SCNC: 111 MMOL/L — HIGH (ref 98–107)
CK MB CFR SERPL CALC: 11.5 NG/ML — HIGH (ref 0–6.7)
CK SERPL-CCNC: 317 U/L — HIGH (ref 30–200)
CO2 SERPL-SCNC: 21 MMOL/L — LOW (ref 22–29)
CO2 SERPL-SCNC: 23 MMOL/L — SIGNIFICANT CHANGE UP (ref 22–29)
CREAT SERPL-MCNC: 0.68 MG/DL — SIGNIFICANT CHANGE UP (ref 0.5–1.3)
CREAT SERPL-MCNC: 0.89 MG/DL — SIGNIFICANT CHANGE UP (ref 0.5–1.3)
GLUCOSE BLDC GLUCOMTR-MCNC: 118 MG/DL — HIGH (ref 70–99)
GLUCOSE BLDC GLUCOMTR-MCNC: 122 MG/DL — HIGH (ref 70–99)
GLUCOSE BLDC GLUCOMTR-MCNC: 129 MG/DL — HIGH (ref 70–99)
GLUCOSE BLDC GLUCOMTR-MCNC: 136 MG/DL — HIGH (ref 70–99)
GLUCOSE BLDC GLUCOMTR-MCNC: 141 MG/DL — HIGH (ref 70–99)
GLUCOSE BLDC GLUCOMTR-MCNC: 192 MG/DL — HIGH (ref 70–99)
GLUCOSE SERPL-MCNC: 123 MG/DL — HIGH (ref 70–115)
GLUCOSE SERPL-MCNC: 161 MG/DL — HIGH (ref 70–115)
HCT VFR BLD CALC: 35.7 % — LOW (ref 42–52)
HGB BLD-MCNC: 11.8 G/DL — LOW (ref 14–18)
MAGNESIUM SERPL-MCNC: 2.6 MG/DL — SIGNIFICANT CHANGE UP (ref 1.6–2.6)
MCHC RBC-ENTMCNC: 27.4 PG — SIGNIFICANT CHANGE UP (ref 27–31)
MCHC RBC-ENTMCNC: 33.1 G/DL — SIGNIFICANT CHANGE UP (ref 32–36)
MCV RBC AUTO: 83 FL — SIGNIFICANT CHANGE UP (ref 80–94)
PLATELET # BLD AUTO: 136 K/UL — LOW (ref 150–400)
POTASSIUM SERPL-MCNC: 4 MMOL/L — SIGNIFICANT CHANGE UP (ref 3.5–5.3)
POTASSIUM SERPL-MCNC: 4.4 MMOL/L — SIGNIFICANT CHANGE UP (ref 3.5–5.3)
POTASSIUM SERPL-SCNC: 4 MMOL/L — SIGNIFICANT CHANGE UP (ref 3.5–5.3)
POTASSIUM SERPL-SCNC: 4.4 MMOL/L — SIGNIFICANT CHANGE UP (ref 3.5–5.3)
PROT SERPL-MCNC: 5.6 G/DL — LOW (ref 6.6–8.7)
RBC # BLD: 4.3 M/UL — LOW (ref 4.6–6.2)
RBC # FLD: 13.7 % — SIGNIFICANT CHANGE UP (ref 11–15.6)
SODIUM SERPL-SCNC: 141 MMOL/L — SIGNIFICANT CHANGE UP (ref 135–145)
SODIUM SERPL-SCNC: 141 MMOL/L — SIGNIFICANT CHANGE UP (ref 135–145)
TROPONIN T SERPL-MCNC: 0.27 NG/ML — HIGH (ref 0–0.06)
TSH RECEP AB FLD-ACNC: <0.5 IU/L — SIGNIFICANT CHANGE UP (ref 0–1.75)
WBC # BLD: 11.8 K/UL — HIGH (ref 4.8–10.8)
WBC # FLD AUTO: 11.8 K/UL — HIGH (ref 4.8–10.8)

## 2019-05-19 PROCEDURE — 71045 X-RAY EXAM CHEST 1 VIEW: CPT | Mod: 26

## 2019-05-19 PROCEDURE — 93010 ELECTROCARDIOGRAM REPORT: CPT

## 2019-05-19 RX ORDER — POTASSIUM CHLORIDE 20 MEQ
10 PACKET (EA) ORAL
Refills: 0 | Status: COMPLETED | OUTPATIENT
Start: 2019-05-19 | End: 2019-05-19

## 2019-05-19 RX ORDER — INSULIN LISPRO 100/ML
VIAL (ML) SUBCUTANEOUS
Refills: 0 | Status: DISCONTINUED | OUTPATIENT
Start: 2019-05-19 | End: 2019-05-21

## 2019-05-19 RX ORDER — OXYCODONE HYDROCHLORIDE 5 MG/1
5 TABLET ORAL ONCE
Refills: 0 | Status: DISCONTINUED | OUTPATIENT
Start: 2019-05-19 | End: 2019-05-19

## 2019-05-19 RX ORDER — FUROSEMIDE 40 MG
40 TABLET ORAL DAILY
Refills: 0 | Status: DISCONTINUED | OUTPATIENT
Start: 2019-05-20 | End: 2019-05-24

## 2019-05-19 RX ORDER — METOPROLOL TARTRATE 50 MG
12.5 TABLET ORAL EVERY 12 HOURS
Refills: 0 | Status: DISCONTINUED | OUTPATIENT
Start: 2019-05-19 | End: 2019-05-19

## 2019-05-19 RX ORDER — ENOXAPARIN SODIUM 100 MG/ML
40 INJECTION SUBCUTANEOUS DAILY
Refills: 0 | Status: DISCONTINUED | OUTPATIENT
Start: 2019-05-19 | End: 2019-05-24

## 2019-05-19 RX ORDER — OXYCODONE AND ACETAMINOPHEN 5; 325 MG/1; MG/1
1 TABLET ORAL EVERY 4 HOURS
Refills: 0 | Status: DISCONTINUED | OUTPATIENT
Start: 2019-05-19 | End: 2019-05-23

## 2019-05-19 RX ORDER — OXYCODONE AND ACETAMINOPHEN 5; 325 MG/1; MG/1
2 TABLET ORAL EVERY 4 HOURS
Refills: 0 | Status: DISCONTINUED | OUTPATIENT
Start: 2019-05-19 | End: 2019-05-23

## 2019-05-19 RX ORDER — HYDROMORPHONE HYDROCHLORIDE 2 MG/ML
0.5 INJECTION INTRAMUSCULAR; INTRAVENOUS; SUBCUTANEOUS ONCE
Refills: 0 | Status: DISCONTINUED | OUTPATIENT
Start: 2019-05-19 | End: 2019-05-19

## 2019-05-19 RX ORDER — METOPROLOL TARTRATE 50 MG
12.5 TABLET ORAL ONCE
Refills: 0 | Status: COMPLETED | OUTPATIENT
Start: 2019-05-19 | End: 2019-05-19

## 2019-05-19 RX ORDER — METOPROLOL TARTRATE 50 MG
25 TABLET ORAL
Refills: 0 | Status: DISCONTINUED | OUTPATIENT
Start: 2019-05-19 | End: 2019-05-24

## 2019-05-19 RX ORDER — MODAFINIL 200 MG/1
200 TABLET ORAL DAILY
Refills: 0 | Status: DISCONTINUED | OUTPATIENT
Start: 2019-05-19 | End: 2019-05-23

## 2019-05-19 RX ORDER — FUROSEMIDE 40 MG
40 TABLET ORAL ONCE
Refills: 0 | Status: COMPLETED | OUTPATIENT
Start: 2019-05-19 | End: 2019-05-19

## 2019-05-19 RX ORDER — ALPRAZOLAM 0.25 MG
0.25 TABLET ORAL EVERY 12 HOURS
Refills: 0 | Status: DISCONTINUED | OUTPATIENT
Start: 2019-05-19 | End: 2019-05-22

## 2019-05-19 RX ADMIN — OXYCODONE AND ACETAMINOPHEN 2 TABLET(S): 5; 325 TABLET ORAL at 15:33

## 2019-05-19 RX ADMIN — MUPIROCIN 1 APPLICATION(S): 20 OINTMENT TOPICAL at 05:52

## 2019-05-19 RX ADMIN — FINASTERIDE 5 MILLIGRAM(S): 5 TABLET, FILM COATED ORAL at 12:06

## 2019-05-19 RX ADMIN — Medication 12.5 MILLIGRAM(S): at 05:52

## 2019-05-19 RX ADMIN — OXYCODONE HYDROCHLORIDE 5 MILLIGRAM(S): 5 TABLET ORAL at 10:06

## 2019-05-19 RX ADMIN — Medication 100 MILLIGRAM(S): at 05:52

## 2019-05-19 RX ADMIN — Medication 50 MILLIEQUIVALENT(S): at 17:40

## 2019-05-19 RX ADMIN — Medication 100 MILLIGRAM(S): at 07:03

## 2019-05-19 RX ADMIN — CHLORHEXIDINE GLUCONATE 1 APPLICATION(S): 213 SOLUTION TOPICAL at 12:09

## 2019-05-19 RX ADMIN — Medication 325 MILLIGRAM(S): at 12:06

## 2019-05-19 RX ADMIN — HYDROMORPHONE HYDROCHLORIDE 0.5 MILLIGRAM(S): 2 INJECTION INTRAMUSCULAR; INTRAVENOUS; SUBCUTANEOUS at 03:45

## 2019-05-19 RX ADMIN — Medication 250 MILLIGRAM(S): at 08:09

## 2019-05-19 RX ADMIN — Medication 250 MILLIGRAM(S): at 21:40

## 2019-05-19 RX ADMIN — Medication 0.25 MILLIGRAM(S): at 12:09

## 2019-05-19 RX ADMIN — ENOXAPARIN SODIUM 40 MILLIGRAM(S): 100 INJECTION SUBCUTANEOUS at 12:07

## 2019-05-19 RX ADMIN — Medication 100 MILLIGRAM(S): at 16:58

## 2019-05-19 RX ADMIN — OXYCODONE AND ACETAMINOPHEN 2 TABLET(S): 5; 325 TABLET ORAL at 14:33

## 2019-05-19 RX ADMIN — Medication 50 MILLIEQUIVALENT(S): at 18:46

## 2019-05-19 RX ADMIN — MUPIROCIN 1 APPLICATION(S): 20 OINTMENT TOPICAL at 17:40

## 2019-05-19 RX ADMIN — PANTOPRAZOLE SODIUM 40 MILLIGRAM(S): 20 TABLET, DELAYED RELEASE ORAL at 05:52

## 2019-05-19 RX ADMIN — ATORVASTATIN CALCIUM 40 MILLIGRAM(S): 80 TABLET, FILM COATED ORAL at 21:40

## 2019-05-19 RX ADMIN — CLOPIDOGREL BISULFATE 75 MILLIGRAM(S): 75 TABLET, FILM COATED ORAL at 12:06

## 2019-05-19 RX ADMIN — Medication 100 MILLIGRAM(S): at 00:22

## 2019-05-19 RX ADMIN — HYDROMORPHONE HYDROCHLORIDE 0.5 MILLIGRAM(S): 2 INJECTION INTRAMUSCULAR; INTRAVENOUS; SUBCUTANEOUS at 04:15

## 2019-05-19 RX ADMIN — Medication 25 MILLIGRAM(S): at 17:40

## 2019-05-19 RX ADMIN — Medication 100 MILLIGRAM(S): at 21:40

## 2019-05-19 RX ADMIN — POLYETHYLENE GLYCOL 3350 17 GRAM(S): 17 POWDER, FOR SOLUTION ORAL at 22:48

## 2019-05-19 RX ADMIN — SENNA PLUS 2 TABLET(S): 8.6 TABLET ORAL at 21:40

## 2019-05-19 RX ADMIN — MODAFINIL 200 MILLIGRAM(S): 200 TABLET ORAL at 12:06

## 2019-05-19 RX ADMIN — Medication 1: at 11:59

## 2019-05-19 RX ADMIN — Medication 100 MILLIGRAM(S): at 14:34

## 2019-05-19 RX ADMIN — MODAFINIL 200 MILLIGRAM(S): 200 TABLET ORAL at 05:52

## 2019-05-19 RX ADMIN — OXYCODONE HYDROCHLORIDE 5 MILLIGRAM(S): 5 TABLET ORAL at 09:06

## 2019-05-19 RX ADMIN — Medication 40 MILLIGRAM(S): at 09:54

## 2019-05-19 RX ADMIN — Medication 12.5 MILLIGRAM(S): at 09:53

## 2019-05-19 RX ADMIN — Medication 50 MILLIEQUIVALENT(S): at 16:58

## 2019-05-19 NOTE — PROGRESS NOTE ADULT - SUBJECTIVE AND OBJECTIVE BOX
Subjective: no c/o at this time. Denies CP, SOB, palpitations, N/V, other c/o.    T(C): 37.2 (05-19-19 @ 00:00), Max: 37.3 (05-18-19 @ 19:00)  HR: 63 (05-19-19 @ 00:00) (52 - 80), NSR  BP: 105/55 (05-18-19 @ 22:45) (105/55 - 120/79)  ABP: 125/54 (05-19-19 @ 00:00) (106/47 - 141/64)  ABP(mean): 74 (05-19-19 @ 00:00) (63 - 89)  RR: 11 (05-19-19 @ 00:00) (10 - 39)  SpO2: 96% (05-19-19 @ 00:00) (90% - 100%) on 60% AFM  CVP(mm Hg): 5 (05-19-19 @ 00:00) (0 - 25)      I&O's Detail    17 May 2019 07:01  -  18 May 2019 07:00  --------------------------------------------------------  Total IN: 990 mL  Total OUT: 200 mL  Total NET: 790 mL    18 May 2019 07:01  -  19 May 2019 01:39  --------------------------------------------------------  IN:    Albumin 5%  - 250 mL: 250 mL    dexmedetomidine Infusion: 4.6 mL    insulin regular Infusion: 28 mL    Lactated Ringers IV Bolus: 500 mL    norepinephrine Infusion: 82 mL    sodium chloride 0.9%.: 120 mL    sodium chloride 0.9%.: 60 mL    Solution: 100 mL    Solution: 50 mL    Solution: 500 mL    Solution: 100 mL    Solution: 250 mL  Total IN: 2044.6 mL    OUT:    Chest Tube: 150 mL    Chest Tube: 50 mL    Indwelling Catheter - Urethral: 1075 mL  Total OUT: 1275 mL    Total NET: 769.6 mL      LABS: All Lab data reviewed and analyzed                        12.6   13.2  )-----------( 161      ( 18 May 2019 13:52 )             38.2     05-18    142  |  108<H>  |  14.0  ----------------------------<  149<H>  3.4<L>   |  21.0<L>  |  0.68    Ca    7.9<L>      18 May 2019 13:52  Phos  1.5     05-18  Mg     2.5     05-18  TPro  5.6<L>  /  Alb  3.3  /  TBili  0.8  /  DBili  x   /  AST  38  /  ALT  25  /  AlkPhos  45  05-18    PT/INR - ( 18 May 2019 13:52 )   PT: 13.8 sec;   INR: 1.19 ratio    PTT - ( 18 May 2019 13:52 )  PTT:26.6 sec    CARDIAC MARKERS ( 18 May 2019 13:52 )   U/L / CKMB13.7 ng/mL / Troponin T0.19 ng/mL /    ABG - ( 18 May 2019 22:01 )  pH, Arterial: 7.38  /  pCO2: 40    /  pO2: 82    / HCO3: 23    / Base Excess: -1.7  /  SaO2: 96        POCT Blood Glucose.: 118 mg/dL (19 May 2019 00:10)  POCT Blood Glucose.: 124 mg/dL (18 May 2019 23:13)  POCT Blood Glucose.: 115 mg/dL (18 May 2019 20:58)  POCT Blood Glucose.: 114 mg/dL (18 May 2019 20:03)  POCT Blood Glucose.: 121 mg/dL (18 May 2019 18:55)  POCT Blood Glucose.: 136 mg/dL (18 May 2019 17:55)  POCT Blood Glucose.: 128 mg/dL (18 May 2019 17:04)  POCT Blood Glucose.: 132 mg/dL (18 May 2019 16:09)  POCT Blood Glucose.: 137 mg/dL (18 May 2019 15:09)  POCT Blood Glucose.: 134 mg/dL (18 May 2019 14:04)           RADIOLOGY: - Reviewed and analyzed   < from: Xray Chest 1 View- PORTABLE-Urgent (05.18.19 @ 15:25) >  There has been median sternotomy. There is an endotracheal tube with tip above the level of the anjel. Nasogastric tube courses below the level of the diaphragm. There is a left chest tube and a mediastinal drain. There is a right internal jugular catheter with tip in the region of the right brachiocephalic vein. There is a small left pleural effusion. Heart is normal.  Impression:  Small left pleural effusion.  Post cardiac surgery.  < end of copied text >    5/19/19 CXR: pending      HOSPITAL MEDICATIONS: All medications reviewed and analyzed  MEDICATIONS  (STANDING):  aspirin enteric coated 325 milliGRAM(s) Oral daily  atorvastatin 40 milliGRAM(s) Oral at bedtime  cefuroxime  IVPB 1500 milliGRAM(s) IV Intermittent every 8 hours  chlorhexidine 0.12% Liquid 5 milliLiter(s) Oral Mucosa every 4 hours  chlorhexidine 2% Cloths 1 Application(s) Topical daily  clopidogrel Tablet 75 milliGRAM(s) Oral daily  docusate sodium 100 milliGRAM(s) Oral three times a day  finasteride 5 milliGRAM(s) Oral daily  insulin regular Infusion 2 Unit(s)/Hr (2 mL/Hr) IV Continuous   mupirocin 2% Ointment 1 Application(s) Topical two times a day  pantoprazole    Tablet 40 milliGRAM(s) Oral before breakfast  polyethylene glycol 3350 17 Gram(s) Oral at bedtime  senna 2 Tablet(s) Oral at bedtime  sodium chloride 0.9%. 1000 milliLiter(s) (5 mL/Hr) IV Continuous   sodium chloride 0.9%. 1000 milliLiter(s) (10 mL/Hr) IV Continuous   vancomycin  IVPB 1000 milliGRAM(s) IV Intermittent every 12 hours      Physical Exam  Neuro: A+O x 3, non-focal, speech clear and intact  HEENT:  NCAT, PERRL, EOMI. No conjuctival edema or icterus, no thrush.  No ETT or NGT/OGT  Neck:  RIJ introducer with site C/D/I. supple, trachea midline, no tracheostomy  Pulm: CTA, good air entry, equal bilaterally, no rales/rhonchi/wheezing, no accessory muscle use noted  Chest: mediastinal CT and left pleural CT all with dressings intact and no air leak, no subQ emphysema       +PW (settings: VVI, rate: 45, mA: 10, threshold: 3.5, sensitivity: 0.8)  CV: regular rate, regular rhythm, +S1S2, no murmur or rub noted  Abd: soft, NT, ND  : guerrero in situ to bedside drainage  Ext: BARAJAS x 4, no edema, no cyanosis or clubbing, distal motor/neuro/circ intact  Skin: warm, dry, well perfused  Incisions: midsternal C/D/I/stable w/ dressing, RLE C/D/I w/ dressing and Ace wrap

## 2019-05-19 NOTE — DIETITIAN INITIAL EVALUATION ADULT. - 25 CAL
Ongoing SW/CM Assessment/Plan of Care Note     See SW/CM flowsheets for goals and other objective data. Patient/Family discharge goal (s):  Goal #1: Communication facilitated  Goal #2: Establish present or future health care decison-maker       PT Recommendation:  Recommendation for Discharge: PT: Post acute therapy    OT Recommendation:  Recommendations for Discharge: OT: Post acute therapy    SLP Recommendation:  Recommendations for Discharge: SLP: TBD    Disposition:       Progress note:   SW following for discharge planning. Per OFTs, pt is not medically stable for discharge. SW is aware that the pt is not amble to ambulate at this time, and may need placement post discharge, however, given his mental status, the pt will not be able to sign himself into a facility. KRISTA is aware a neuro-psych eval was completed on today's date, and the pt was seen to lack the capacity to understand and make medical decisions. SW will plan to speak with the pt's family regarding the above. Guardianship may need to be pursued for placement.      KRISTA will continue to follow through discharge    KERI Klein, [facility] -[phone]

## 2019-05-19 NOTE — PROGRESS NOTE ADULT - ASSESSMENT
68 year old male with PMH CAD, PCI, GERD, BPH, HTN, HLD. 5/15 elective cath after c/o CP. 5/18 s/p CABG x 3 (LIMA to LAD, SVG to OM, SVG to ramus).

## 2019-05-20 ENCOUNTER — RX CHANGE (OUTPATIENT)
Age: 69
End: 2019-05-20

## 2019-05-20 LAB
ANION GAP SERPL CALC-SCNC: 6 MMOL/L — SIGNIFICANT CHANGE UP (ref 5–17)
BUN SERPL-MCNC: 22 MG/DL — HIGH (ref 8–20)
CALCIUM SERPL-MCNC: 8.1 MG/DL — LOW (ref 8.6–10.2)
CHLORIDE SERPL-SCNC: 109 MMOL/L — HIGH (ref 98–107)
CO2 SERPL-SCNC: 23 MMOL/L — SIGNIFICANT CHANGE UP (ref 22–29)
CREAT SERPL-MCNC: 0.77 MG/DL — SIGNIFICANT CHANGE UP (ref 0.5–1.3)
GLUCOSE BLDC GLUCOMTR-MCNC: 104 MG/DL — HIGH (ref 70–99)
GLUCOSE BLDC GLUCOMTR-MCNC: 111 MG/DL — HIGH (ref 70–99)
GLUCOSE BLDC GLUCOMTR-MCNC: 125 MG/DL — HIGH (ref 70–99)
GLUCOSE BLDC GLUCOMTR-MCNC: 132 MG/DL — HIGH (ref 70–99)
GLUCOSE SERPL-MCNC: 171 MG/DL — HIGH (ref 70–115)
HCT VFR BLD CALC: 35.6 % — LOW (ref 42–52)
HGB BLD-MCNC: 11.6 G/DL — LOW (ref 14–18)
MAGNESIUM SERPL-MCNC: 2.3 MG/DL — SIGNIFICANT CHANGE UP (ref 1.6–2.6)
MCHC RBC-ENTMCNC: 27.6 PG — SIGNIFICANT CHANGE UP (ref 27–31)
MCHC RBC-ENTMCNC: 32.6 G/DL — SIGNIFICANT CHANGE UP (ref 32–36)
MCV RBC AUTO: 84.8 FL — SIGNIFICANT CHANGE UP (ref 80–94)
PHOSPHATE SERPL-MCNC: 2.3 MG/DL — LOW (ref 2.4–4.7)
PLATELET # BLD AUTO: 140 K/UL — LOW (ref 150–400)
POTASSIUM SERPL-MCNC: 4.3 MMOL/L — SIGNIFICANT CHANGE UP (ref 3.5–5.3)
POTASSIUM SERPL-SCNC: 4.3 MMOL/L — SIGNIFICANT CHANGE UP (ref 3.5–5.3)
RBC # BLD: 4.2 M/UL — LOW (ref 4.6–6.2)
RBC # FLD: 14.1 % — SIGNIFICANT CHANGE UP (ref 11–15.6)
SODIUM SERPL-SCNC: 138 MMOL/L — SIGNIFICANT CHANGE UP (ref 135–145)
WBC # BLD: 15 K/UL — HIGH (ref 4.8–10.8)
WBC # FLD AUTO: 15 K/UL — HIGH (ref 4.8–10.8)

## 2019-05-20 PROCEDURE — 93010 ELECTROCARDIOGRAM REPORT: CPT

## 2019-05-20 PROCEDURE — 71045 X-RAY EXAM CHEST 1 VIEW: CPT | Mod: 26

## 2019-05-20 RX ORDER — SODIUM CHLORIDE 9 MG/ML
3 INJECTION INTRAMUSCULAR; INTRAVENOUS; SUBCUTANEOUS EVERY 8 HOURS
Refills: 0 | Status: DISCONTINUED | OUTPATIENT
Start: 2019-05-20 | End: 2019-05-24

## 2019-05-20 RX ADMIN — Medication 100 MILLIGRAM(S): at 05:46

## 2019-05-20 RX ADMIN — PANTOPRAZOLE SODIUM 40 MILLIGRAM(S): 20 TABLET, DELAYED RELEASE ORAL at 05:46

## 2019-05-20 RX ADMIN — Medication 25 MILLIGRAM(S): at 05:46

## 2019-05-20 RX ADMIN — POLYETHYLENE GLYCOL 3350 17 GRAM(S): 17 POWDER, FOR SOLUTION ORAL at 22:12

## 2019-05-20 RX ADMIN — ENOXAPARIN SODIUM 40 MILLIGRAM(S): 100 INJECTION SUBCUTANEOUS at 11:20

## 2019-05-20 RX ADMIN — SENNA PLUS 2 TABLET(S): 8.6 TABLET ORAL at 22:12

## 2019-05-20 RX ADMIN — SODIUM CHLORIDE 3 MILLILITER(S): 9 INJECTION INTRAMUSCULAR; INTRAVENOUS; SUBCUTANEOUS at 22:11

## 2019-05-20 RX ADMIN — OXYCODONE AND ACETAMINOPHEN 2 TABLET(S): 5; 325 TABLET ORAL at 02:27

## 2019-05-20 RX ADMIN — CLOPIDOGREL BISULFATE 75 MILLIGRAM(S): 75 TABLET, FILM COATED ORAL at 11:19

## 2019-05-20 RX ADMIN — MUPIROCIN 1 APPLICATION(S): 20 OINTMENT TOPICAL at 18:32

## 2019-05-20 RX ADMIN — Medication 100 MILLIGRAM(S): at 13:18

## 2019-05-20 RX ADMIN — Medication 100 MILLIGRAM(S): at 22:12

## 2019-05-20 RX ADMIN — Medication 40 MILLIGRAM(S): at 05:46

## 2019-05-20 RX ADMIN — OXYCODONE AND ACETAMINOPHEN 1 TABLET(S): 5; 325 TABLET ORAL at 12:20

## 2019-05-20 RX ADMIN — FINASTERIDE 5 MILLIGRAM(S): 5 TABLET, FILM COATED ORAL at 11:20

## 2019-05-20 RX ADMIN — ATORVASTATIN CALCIUM 40 MILLIGRAM(S): 80 TABLET, FILM COATED ORAL at 22:12

## 2019-05-20 RX ADMIN — Medication 25 MILLIGRAM(S): at 18:33

## 2019-05-20 RX ADMIN — MODAFINIL 200 MILLIGRAM(S): 200 TABLET ORAL at 11:19

## 2019-05-20 RX ADMIN — Medication 250 MILLIGRAM(S): at 08:17

## 2019-05-20 RX ADMIN — Medication 5 MILLIGRAM(S): at 20:22

## 2019-05-20 RX ADMIN — Medication 100 MILLIGRAM(S): at 00:30

## 2019-05-20 RX ADMIN — OXYCODONE AND ACETAMINOPHEN 1 TABLET(S): 5; 325 TABLET ORAL at 20:23

## 2019-05-20 RX ADMIN — OXYCODONE AND ACETAMINOPHEN 1 TABLET(S): 5; 325 TABLET ORAL at 20:51

## 2019-05-20 RX ADMIN — SODIUM CHLORIDE 3 MILLILITER(S): 9 INJECTION INTRAMUSCULAR; INTRAVENOUS; SUBCUTANEOUS at 13:18

## 2019-05-20 RX ADMIN — OXYCODONE AND ACETAMINOPHEN 2 TABLET(S): 5; 325 TABLET ORAL at 01:27

## 2019-05-20 RX ADMIN — MUPIROCIN 1 APPLICATION(S): 20 OINTMENT TOPICAL at 05:47

## 2019-05-20 RX ADMIN — Medication 325 MILLIGRAM(S): at 11:19

## 2019-05-20 RX ADMIN — OXYCODONE AND ACETAMINOPHEN 1 TABLET(S): 5; 325 TABLET ORAL at 11:20

## 2019-05-20 NOTE — CHART NOTE - NSCHARTNOTEFT_GEN_A_CORE
68 year old male with PMH CAD, PCI, GERD, BPH, HTN, HLD. 5/15 elective cath after c/o CP. 5/18 s/p CABG x 3 (LIMA to LAD, SVG to OM, SVG to ramus).    Transferred from [facility] this evening ,denies pain,OOB chair, wife visited  Called to room by RN as pt had droplets of blood from urethra, no signs active bleeding  Pt with history intermittent incontinence, guerrero was removed yesterday  Will continue to monitor

## 2019-05-20 NOTE — PROGRESS NOTE ADULT - SUBJECTIVE AND OBJECTIVE BOX
pt seen and examined, no complaints, ROS - .     ALPRAZolam 0.25 milliGRAM(s) Oral every 12 hours PRN  aspirin enteric coated 325 milliGRAM(s) Oral daily  atorvastatin 40 milliGRAM(s) Oral at bedtime  cefuroxime  IVPB 1500 milliGRAM(s) IV Intermittent every 8 hours  chlorhexidine 2% Cloths 1 Application(s) Topical daily  clopidogrel Tablet 75 milliGRAM(s) Oral daily  docusate sodium 100 milliGRAM(s) Oral three times a day  enoxaparin Injectable 40 milliGRAM(s) SubCutaneous daily  finasteride 5 milliGRAM(s) Oral daily  furosemide    Tablet 40 milliGRAM(s) Oral daily  insulin lispro (HumaLOG) corrective regimen sliding scale   SubCutaneous Before meals and at bedtime  metoprolol tartrate 25 milliGRAM(s) Oral two times a day  modafinil 200 milliGRAM(s) Oral daily  mupirocin 2% Ointment 1 Application(s) Topical two times a day  oxyCODONE    5 mG/acetaminophen 325 mG 1 Tablet(s) Oral every 4 hours PRN  oxyCODONE    5 mG/acetaminophen 325 mG 2 Tablet(s) Oral every 4 hours PRN  pantoprazole    Tablet 40 milliGRAM(s) Oral before breakfast  polyethylene glycol 3350 17 Gram(s) Oral at bedtime  senna 2 Tablet(s) Oral at bedtime  sodium chloride 0.9%. 1000 milliLiter(s) IV Continuous <Continuous>  sodium chloride 0.9%. 1000 milliLiter(s) IV Continuous <Continuous>  vancomycin  IVPB 1000 milliGRAM(s) IV Intermittent every 12 hours                            11.8   11.8  )-----------( 136      ( 19 May 2019 02:28 )             35.7       Hemoglobin: 11.8 g/dL (05-19 @ 02:28)  Hemoglobin: 12.6 g/dL (05-18 @ 13:52)  Hemoglobin: 13.6 g/dL (05-18 @ 06:44)  Hemoglobin: 13.9 g/dL (05-17 @ 06:16)  Hemoglobin: 14.1 g/dL (05-16 @ 06:20)      05-19    141  |  105  |  18.0  ----------------------------<  161<H>  4.0   |  23.0  |  0.89    Ca    8.3<L>      19 May 2019 14:06  Phos  1.5     05-18  Mg     2.6     05-19    TPro  5.6<L>  /  Alb  3.4  /  TBili  0.4  /  DBili  x   /  AST  39  /  ALT  27  /  AlkPhos  40  05-19    Creatinine Trend: 0.89<--, 0.68<--, 0.68<--, 0.79<--, 0.80<--    COAGS:     CARDIAC MARKERS ( 19 May 2019 02:30 )  x     / x     / x     / x     / 11.5 ng/mL  CARDIAC MARKERS ( 19 May 2019 02:28 )  x     / 0.27 ng/mL / 317 U/L / x     / x      CARDIAC MARKERS ( 18 May 2019 13:52 )  x     / 0.19 ng/mL / 206 U/L / x     / 13.7 ng/mL  CARDIAC MARKERS ( 17 May 2019 22:49 )  x     / <0.01 ng/mL / 100 U/L / x     / x            T(C): 36.9 (05-19-19 @ 18:00), Max: 37.6 (05-19-19 @ 07:00)  HR: 74 (05-19-19 @ 23:00) (61 - 82)  BP: 137/73 (05-19-19 @ 23:00) (118/67 - 157/68)  RR: 26 (05-19-19 @ 23:00) (12 - 29)  SpO2: 95% (05-19-19 @ 23:00) (92% - 97%)  Wt(kg): --    I&O's Summary    18 May 2019 07:01  -  19 May 2019 07:00  --------------------------------------------------------  IN: 2187.6 mL / OUT: 1775 mL / NET: 412.6 mL    19 May 2019 07:01  -  20 May 2019 01:03  --------------------------------------------------------  IN: 1555 mL / OUT: 2165 mL / NET: -610 mL           RADIOLOGY: - Reviewed and analyzed   < from: Xray Chest 1 View- PORTABLE-Urgent (05.18.19 @ 15:25) >  There has been median sternotomy. There is an endotracheal tube with tip above the level of the anjel. Nasogastric tube courses below the level of the diaphragm. There is a left chest tube and a mediastinal drain. There is a right internal jugular catheter with tip in the region of the right brachiocephalic vein. There is a small left pleural effusion. Heart is normal.  Impression:  Small left pleural effusion.  Post cardiac surgery.  < end of copied text >    Physical Exam  Neuro: A+O x 3, non-focal, speech clear and intact  HEENT:  NCAT, PERRL, EOMI. No conjuctival edema or icterus, no thrush.  No ETT or NGT/OGT  Neck:  RIJ introducer with site C/D/I. supple, trachea midline, no tracheostomy  Pulm: CTA, good air entry, equal bilaterally, no rales/rhonchi/wheezing, no accessory muscle use noted  Chest: mediastinal CT and left pleural CT all with dressings intact and no air leak, no subQ emphysema       +PW (settings: VVI, rate: 45, mA: 10, threshold: 3.5, sensitivity: 0.8)  CV: regular rate, regular rhythm, +S1S2, no murmur or rub noted  Abd: soft, NT, ND  : guerrero in situ to bedside drainage  Ext: BARAJAS x 4, no edema, no cyanosis or clubbing, distal motor/neuro/circ intact  Skin: warm, dry, well perfused  Incisions: midsternal C/D/I/stable w/ dressing, RLE C/D/I w/ dressing and Ace wrap

## 2019-05-21 DIAGNOSIS — R31.0 GROSS HEMATURIA: ICD-10-CM

## 2019-05-21 LAB
ANION GAP SERPL CALC-SCNC: 11 MMOL/L — SIGNIFICANT CHANGE UP (ref 5–17)
APPEARANCE UR: CLEAR — SIGNIFICANT CHANGE UP
BACTERIA # UR AUTO: ABNORMAL
BILIRUB UR-MCNC: NEGATIVE — SIGNIFICANT CHANGE UP
BUN SERPL-MCNC: 21 MG/DL — HIGH (ref 8–20)
CALCIUM SERPL-MCNC: 8.4 MG/DL — LOW (ref 8.6–10.2)
CHLORIDE SERPL-SCNC: 109 MMOL/L — HIGH (ref 98–107)
CO2 SERPL-SCNC: 24 MMOL/L — SIGNIFICANT CHANGE UP (ref 22–29)
COLOR SPEC: YELLOW — SIGNIFICANT CHANGE UP
CREAT SERPL-MCNC: 0.68 MG/DL — SIGNIFICANT CHANGE UP (ref 0.5–1.3)
DIFF PNL FLD: ABNORMAL
EPI CELLS # UR: SIGNIFICANT CHANGE UP
GLUCOSE BLDC GLUCOMTR-MCNC: 108 MG/DL — HIGH (ref 70–99)
GLUCOSE BLDC GLUCOMTR-MCNC: 109 MG/DL — HIGH (ref 70–99)
GLUCOSE SERPL-MCNC: 107 MG/DL — SIGNIFICANT CHANGE UP (ref 70–115)
GLUCOSE UR QL: NEGATIVE MG/DL — SIGNIFICANT CHANGE UP
HCT VFR BLD CALC: 36.2 % — LOW (ref 42–52)
HCT VFR BLD CALC: 40 % — LOW (ref 42–52)
HGB BLD-MCNC: 12.2 G/DL — LOW (ref 14–18)
HGB BLD-MCNC: 13.1 G/DL — LOW (ref 14–18)
HYALINE CASTS # UR AUTO: ABNORMAL /LPF
KETONES UR-MCNC: NEGATIVE — SIGNIFICANT CHANGE UP
LEUKOCYTE ESTERASE UR-ACNC: NEGATIVE — SIGNIFICANT CHANGE UP
MAGNESIUM SERPL-MCNC: 2.3 MG/DL — SIGNIFICANT CHANGE UP (ref 1.6–2.6)
MCHC RBC-ENTMCNC: 27.7 PG — SIGNIFICANT CHANGE UP (ref 27–31)
MCHC RBC-ENTMCNC: 28.5 PG — SIGNIFICANT CHANGE UP (ref 27–31)
MCHC RBC-ENTMCNC: 32.8 G/DL — SIGNIFICANT CHANGE UP (ref 32–36)
MCHC RBC-ENTMCNC: 33.7 G/DL — SIGNIFICANT CHANGE UP (ref 32–36)
MCV RBC AUTO: 84.6 FL — SIGNIFICANT CHANGE UP (ref 80–94)
MCV RBC AUTO: 84.6 FL — SIGNIFICANT CHANGE UP (ref 80–94)
NITRITE UR-MCNC: NEGATIVE — SIGNIFICANT CHANGE UP
PH UR: 6.5 — SIGNIFICANT CHANGE UP (ref 5–8)
PLATELET # BLD AUTO: 154 K/UL — SIGNIFICANT CHANGE UP (ref 150–400)
PLATELET # BLD AUTO: 154 K/UL — SIGNIFICANT CHANGE UP (ref 150–400)
POTASSIUM SERPL-MCNC: 4 MMOL/L — SIGNIFICANT CHANGE UP (ref 3.5–5.3)
POTASSIUM SERPL-SCNC: 4 MMOL/L — SIGNIFICANT CHANGE UP (ref 3.5–5.3)
PROT UR-MCNC: 30 MG/DL
RBC # BLD: 4.28 M/UL — LOW (ref 4.6–6.2)
RBC # BLD: 4.73 M/UL — SIGNIFICANT CHANGE UP (ref 4.6–6.2)
RBC # FLD: 13.9 % — SIGNIFICANT CHANGE UP (ref 11–15.6)
RBC # FLD: 14.2 % — SIGNIFICANT CHANGE UP (ref 11–15.6)
RBC CASTS # UR COMP ASSIST: >50 /HPF (ref 0–4)
SODIUM SERPL-SCNC: 144 MMOL/L — SIGNIFICANT CHANGE UP (ref 135–145)
SP GR SPEC: 1.01 — SIGNIFICANT CHANGE UP (ref 1.01–1.02)
UROBILINOGEN FLD QL: NEGATIVE MG/DL — SIGNIFICANT CHANGE UP
WBC # BLD: 10.1 K/UL — SIGNIFICANT CHANGE UP (ref 4.8–10.8)
WBC # BLD: 11.8 K/UL — HIGH (ref 4.8–10.8)
WBC # FLD AUTO: 10.1 K/UL — SIGNIFICANT CHANGE UP (ref 4.8–10.8)
WBC # FLD AUTO: 11.8 K/UL — HIGH (ref 4.8–10.8)
WBC UR QL: SIGNIFICANT CHANGE UP

## 2019-05-21 PROCEDURE — 99222 1ST HOSP IP/OBS MODERATE 55: CPT

## 2019-05-21 PROCEDURE — 93010 ELECTROCARDIOGRAM REPORT: CPT

## 2019-05-21 PROCEDURE — 99232 SBSQ HOSP IP/OBS MODERATE 35: CPT | Mod: 24

## 2019-05-21 PROCEDURE — 71045 X-RAY EXAM CHEST 1 VIEW: CPT | Mod: 26

## 2019-05-21 PROCEDURE — 71045 X-RAY EXAM CHEST 1 VIEW: CPT | Mod: 26,77

## 2019-05-21 RX ORDER — PHENAZOPYRIDINE HCL 100 MG
100 TABLET ORAL EVERY 8 HOURS
Refills: 0 | Status: DISCONTINUED | OUTPATIENT
Start: 2019-05-21 | End: 2019-05-23

## 2019-05-21 RX ORDER — SORBITOL SOLUTION 70 %
30 SOLUTION, ORAL MISCELLANEOUS ONCE
Refills: 0 | Status: COMPLETED | OUTPATIENT
Start: 2019-05-21 | End: 2019-05-21

## 2019-05-21 RX ORDER — SIMETHICONE 80 MG/1
80 TABLET, CHEWABLE ORAL THREE TIMES A DAY
Refills: 0 | Status: DISCONTINUED | OUTPATIENT
Start: 2019-05-21 | End: 2019-05-24

## 2019-05-21 RX ADMIN — Medication 100 MILLIGRAM(S): at 17:21

## 2019-05-21 RX ADMIN — Medication 30 MILLILITER(S): at 21:22

## 2019-05-21 RX ADMIN — SENNA PLUS 2 TABLET(S): 8.6 TABLET ORAL at 21:22

## 2019-05-21 RX ADMIN — ATORVASTATIN CALCIUM 40 MILLIGRAM(S): 80 TABLET, FILM COATED ORAL at 21:22

## 2019-05-21 RX ADMIN — SODIUM CHLORIDE 3 MILLILITER(S): 9 INJECTION INTRAMUSCULAR; INTRAVENOUS; SUBCUTANEOUS at 05:07

## 2019-05-21 RX ADMIN — SODIUM CHLORIDE 3 MILLILITER(S): 9 INJECTION INTRAMUSCULAR; INTRAVENOUS; SUBCUTANEOUS at 21:20

## 2019-05-21 RX ADMIN — FINASTERIDE 5 MILLIGRAM(S): 5 TABLET, FILM COATED ORAL at 11:32

## 2019-05-21 RX ADMIN — OXYCODONE AND ACETAMINOPHEN 2 TABLET(S): 5; 325 TABLET ORAL at 11:34

## 2019-05-21 RX ADMIN — Medication 25 MILLIGRAM(S): at 17:21

## 2019-05-21 RX ADMIN — Medication 25 MILLIGRAM(S): at 05:06

## 2019-05-21 RX ADMIN — Medication 100 MILLIGRAM(S): at 05:06

## 2019-05-21 RX ADMIN — OXYCODONE AND ACETAMINOPHEN 2 TABLET(S): 5; 325 TABLET ORAL at 05:06

## 2019-05-21 RX ADMIN — POLYETHYLENE GLYCOL 3350 17 GRAM(S): 17 POWDER, FOR SOLUTION ORAL at 21:22

## 2019-05-21 RX ADMIN — Medication 100 MILLIGRAM(S): at 21:22

## 2019-05-21 RX ADMIN — Medication 40 MILLIGRAM(S): at 05:06

## 2019-05-21 RX ADMIN — SODIUM CHLORIDE 3 MILLILITER(S): 9 INJECTION INTRAMUSCULAR; INTRAVENOUS; SUBCUTANEOUS at 13:45

## 2019-05-21 RX ADMIN — Medication 100 MILLIGRAM(S): at 21:21

## 2019-05-21 RX ADMIN — OXYCODONE AND ACETAMINOPHEN 2 TABLET(S): 5; 325 TABLET ORAL at 05:36

## 2019-05-21 RX ADMIN — PANTOPRAZOLE SODIUM 40 MILLIGRAM(S): 20 TABLET, DELAYED RELEASE ORAL at 05:06

## 2019-05-21 RX ADMIN — CLOPIDOGREL BISULFATE 75 MILLIGRAM(S): 75 TABLET, FILM COATED ORAL at 11:31

## 2019-05-21 RX ADMIN — Medication 1 TABLET(S): at 18:34

## 2019-05-21 RX ADMIN — Medication 325 MILLIGRAM(S): at 11:30

## 2019-05-21 RX ADMIN — ENOXAPARIN SODIUM 40 MILLIGRAM(S): 100 INJECTION SUBCUTANEOUS at 11:31

## 2019-05-21 RX ADMIN — MODAFINIL 200 MILLIGRAM(S): 200 TABLET ORAL at 11:33

## 2019-05-21 RX ADMIN — OXYCODONE AND ACETAMINOPHEN 2 TABLET(S): 5; 325 TABLET ORAL at 12:00

## 2019-05-21 NOTE — PROGRESS NOTE ADULT - SUBJECTIVE AND OBJECTIVE BOX
Subjective: "I felt like some food just got caught in my throat. And yesterday I had severe pain when I pushed back in my chair at the incision. " Patient also reports a mildly sore throat and some blood tinged urine yesterday with persistent mild dysuria. Patient seems anxious but not acute respiratory or pain distress at this time. Denies SOB, N/V.    VITAL SIGNS  Vital Signs Last 24 Hrs  T(C): 36.7 (19 @ 05:00), Max: 37.1 (19 @ 12:00)  T(F): 98.1 (19 @ 05:00), Max: 98.8 (19 @ 12:00)  HR: 69 (19 @ 05:00) (69 - 78)  BP: 162/82 (19 @ 05:00) (145/65 - 162/82)  RR: 18 (19 @ 05:00) (18 - 28)  SpO2: 95% (19 @ 05:00) (92% - 98%)  on RA              Telemetry/Alarms:  SR 60s  LVEF: 60    MEDICATIONS  ALPRAZolam 0.25 milliGRAM(s) Oral every 12 hours PRN  aspirin enteric coated 325 milliGRAM(s) Oral daily  atorvastatin 40 milliGRAM(s) Oral at bedtime  clopidogrel Tablet 75 milliGRAM(s) Oral daily  docusate sodium 100 milliGRAM(s) Oral three times a day  enoxaparin Injectable 40 milliGRAM(s) SubCutaneous daily  finasteride 5 milliGRAM(s) Oral daily  furosemide    Tablet 40 milliGRAM(s) Oral daily  insulin lispro (HumaLOG) corrective regimen sliding scale   SubCutaneous Before meals and at bedtime  metoprolol tartrate 25 milliGRAM(s) Oral two times a day  modafinil 200 milliGRAM(s) Oral daily  oxyCODONE    5 mG/acetaminophen 325 mG 1 Tablet(s) Oral every 4 hours PRN  oxyCODONE    5 mG/acetaminophen 325 mG 2 Tablet(s) Oral every 4 hours PRN  pantoprazole    Tablet 40 milliGRAM(s) Oral before breakfast  polyethylene glycol 3350 17 Gram(s) Oral at bedtime  senna 2 Tablet(s) Oral at bedtime  sodium chloride 0.9% lock flush 3 milliLiter(s) IV Push every 8 hours      PHYSICAL EXAM  General: well nourished, well developed, no acute distress  Neurology: alert and oriented x 3, nonfocal, no gross deficits  Respiratory: diminished bases with a few crackles right base  CV: regular rate and rhythm, normal S1, S2   Abdomen: soft, nontender, nondistended, positive bowel sounds, last bowel movement PREOP  Extremities: warm, well perfused. 1+ edema. + DP pulses  Incisions: midline sternal incision, + Prevena, c/d/i. sternum stable. RLE EVH incision c/d/i, LLE extended EVH incision c/d/i, b/l ACE wraps  Chest tubes: L chest tube without air leak, minimal drainage > removed without issue, CXR pwnding  Epicardial Wires:  VW isolated       @ 07:01  -   @ 07:00  --------------------------------------------------------  IN: 1460 mL / OUT: 1207 mL / NET: 253 mL        Weights:  Daily     Daily Weight in k.2 (21 May 2019 06:00)  Admit Wt: Drug Dosing Weight  Height (cm): 167.6 (17 May 2019 00:00)  Weight (kg): 92.1 (17 May 2019 00:00)  BMI (kg/m2): 32.8 (17 May 2019 00:00)  BSA (m2): 2.01 (17 May 2019 00:00)    All laboratory results, radiology and medications reviewed.    LABS      144  |  109<H>  |  21.0<H>  ----------------------------<  107  4.0   |  24.0  |  0.68    Ca    8.4<L>      21 May 2019 06:22  Phos  2.3     05-20  Mg     2.3                                        12.2   11.8  )-----------( 154      ( 21 May 2019 06:22 )             36.2              CAPILLARY BLOOD GLUCOSE      POCT Blood Glucose.: 108 mg/dL (21 May 2019 08:13)  POCT Blood Glucose.: 132 mg/dL (20 May 2019 22:20)  POCT Blood Glucose.: 111 mg/dL (20 May 2019 17:44)           Today's CXR:  < from: Xray Chest 1 View- PORTABLE-Routine (19 @ 05:37) >    FINDINGS:   2019 available for review.  Left chest tube in place.    The lungs  are clear.  No pleural abnormality is seen.        The heart and mediastinum are within normal limits following open heart   surgery.     Visualizedosseous structures are intact.    < end of copied text >    Today's EKG: < from: 12 Lead ECG (19 @ 04:33) >      Diagnosis Line Normal sinus rhythm  ST elevation, consider early repolarization, pericarditis, or injury  T wave abnormality, consider inferior ischemia  Abnormal ECG    < end of copied text >      PAST MEDICAL & SURGICAL HISTORY:  CAD S/P percutaneous coronary angioplasty  GERD (gastroesophageal reflux disease)  SOB (shortness of breath)  Chest pain  BPH (benign prostatic hyperplasia)  Hyperlipidemia  Hypertension  H/O hernia repair

## 2019-05-21 NOTE — CONSULT NOTE ADULT - SUBJECTIVE AND OBJECTIVE BOX
ANÍBAL RAMEY  [id number]  68y, Male    Chest pain      Patient is a 68y old  Male who presents with a chief complaint of s/p CABG (18 May 2019 13:45)      HPI:    Patient with known history of BPH and history of 4 prostate biopsies, all benign, noted post op to have hematuria.  He was having blood around the catheter but not much in the urine. Since the guerrero has been removed he has some hematuriabut it seems to be improving. no frequency or urgency. no history of stones. no flank pain.       Allergies    No Known Allergies    Intolerances    OHS (Unknown)      MEDICATIONS  (STANDING):  aspirin enteric coated 325 milliGRAM(s) Oral daily  atorvastatin 40 milliGRAM(s) Oral at bedtime  clopidogrel Tablet 75 milliGRAM(s) Oral daily  docusate sodium 100 milliGRAM(s) Oral three times a day  enoxaparin Injectable 40 milliGRAM(s) SubCutaneous daily  finasteride 5 milliGRAM(s) Oral daily  furosemide    Tablet 40 milliGRAM(s) Oral daily  metoprolol tartrate 25 milliGRAM(s) Oral two times a day  modafinil 200 milliGRAM(s) Oral daily  pantoprazole    Tablet 40 milliGRAM(s) Oral before breakfast  phenazopyridine 100 milliGRAM(s) Oral every 8 hours  polyethylene glycol 3350 17 Gram(s) Oral at bedtime  senna 2 Tablet(s) Oral at bedtime  sodium chloride 0.9% lock flush 3 milliLiter(s) IV Push every 8 hours  sorbitol 70% Solution 30 milliLiter(s) Oral once  trimethoprim  160 mG/sulfamethoxazole 800 mG 1 Tablet(s) Oral two times a day    MEDICATIONS  (PRN):  ALPRAZolam 0.25 milliGRAM(s) Oral every 12 hours PRN anxiety  oxyCODONE    5 mG/acetaminophen 325 mG 1 Tablet(s) Oral every 4 hours PRN Moderate Pain (4 - 6)  oxyCODONE    5 mG/acetaminophen 325 mG 2 Tablet(s) Oral every 4 hours PRN Severe Pain (7 - 10)  simethicone 80 milliGRAM(s) Chew three times a day PRN Gas      PAST MEDICAL & SURGICAL HISTORY:  CAD S/P percutaneous coronary angioplasty  GERD (gastroesophageal reflux disease)  SOB (shortness of breath)  Chest pain  BPH (benign prostatic hyperplasia)  Hyperlipidemia  Hypertension  H/O hernia repair      REVIEW OF SYSTEMS      General: post op pain.	    Skin/Breast: no itching  	  Ophthalmologic: no vision changes  	  ENMT:	no hearing changes    Respiratory and Thorax: no breathing issues      Genitourinary:	see HPI    Musculoskeletal:	 good ROM    Neurological: no seizure	    Psychiatric: normal mentation	    Hematology/Lymphatics:	 on anti-platelet meds.     Endocrine:	no cold intolerance    	      I&O's Detail    20 May 2019 07:01  -  21 May 2019 07:00  --------------------------------------------------------  IN:    Oral Fluid: 1460 mL  Total IN: 1460 mL    OUT:    Chest Tube: 32 mL    Voided: 1175 mL  Total OUT: 1207 mL    Total NET: 253 mL      21 May 2019 07:01  -  21 May 2019 18:05  --------------------------------------------------------  IN:    Oral Fluid: 240 mL  Total IN: 240 mL    OUT:    Voided: 1000 mL  Total OUT: 1000 mL    Total NET: -760 mL          PE:    Vital Signs Last 24 Hrs  T(C): 36.9 (21 May 2019 16:26), Max: 36.9 (21 May 2019 16:26)  T(F): 98.4 (21 May 2019 16:26), Max: 98.4 (21 May 2019 16:26)  HR: 75 (21 May 2019 17:19) (67 - 78)  BP: 148/90 (21 May 2019 17:19) (140/78 - 162/82)  BP(mean): --  RR: 18 (21 May 2019 16:26) (18 - 18)  SpO2: 98% (21 May 2019 16:26) (95% - 98%)    Daily     Daily Weight in k.2 (21 May 2019 06:00)    PHYSICAL EXAM:      Constitutional: appears comfortable    Eyes: conjugate gaze    ENMT: NC/AT    Neck: good ROM    Respiratory: no acute respiratory distress    Cardiovascular: good cap refill    Gastrointestinal: abdomen flat    Genitourinary: testes normal,. phallus normal, circ, meatus normal, scrotal skin normal.    Rectal:  deferred    Extremities: no clubbing or cyanosis    Neurological: no tremor    Skin: no jaundice    Lymph Nodes: no inguinal nodes palpable    Musculoskeletal: good strength    Psychiatric: alert and oriented X 3        Labs:                          12.2   11.8  )-----------( 154      ( 21 May 2019 06:22 )             36.2       -    144  |  109<H>  |  21.0<H>  ----------------------------<  107  4.0   |  24.0  |  0.68    Ca    8.4<L>      21 May 2019 06:22  Phos  2.3     -  Mg     2.3             Urinalysis Basic - ( 21 May 2019 17:41 )    Color: Yellow / Appearance: Clear / S.010 / pH: x  Gluc: x / Ketone: Negative  / Bili: Negative / Urobili: Negative mg/dL   Blood: x / Protein: 30 mg/dL / Nitrite: Negative   Leuk Esterase: Negative / RBC: >50 /HPF / WBC 0-2   Sq Epi: x / Non Sq Epi: Occasional / Bacteria: Occasional        Impression:    gross hematuria, suspect it is due to guerrero passing against BPH along with a combination of lovenox, plavix, ASA      Plan:    Push fluids, renal ultrasound.   Urine culture pending.     Fernando Garrett MD  19 @ 18:05

## 2019-05-21 NOTE — PROGRESS NOTE ADULT - ASSESSMENT
68 year old male with PMH CAD, PCI, GERD, BPH, HTN, HLD p/w midsternal chest discomfort > cath revealed MVD.    5/18 s/p CABG x 3 (LIMA to LAD, SVG to OM, SVG to ramus) with Dr Estrada    Postoperative course notable for bradycardia immediately postop, now resolved. Patient also had some hematuria and dysuria.

## 2019-05-21 NOTE — CHART NOTE - NSCHARTNOTEFT_GEN_A_CORE
CTS PA    Asked by RN to eval patient with hematuria. Patient found in chair with urinal at side, ~ 50 cc orange urine, patient holding gauze over penis with a fully bloodied gauze + clots in a chux and persistent bleeding on additional gauze. Patient also complaining of shaking chills and pain with urination. Patient assisted back to bed and penis examined. No gross bleeding by that time but small amount of dried blood at urethral opening. No obvious laceration noted. No swelling or bruising. Patient is hypertensive (158/) but not febrile (97.9 orally). UA/Ucx sent, urology consult called. CBC ordered. Bladder scan only 178 cc so will try to avoid placing a guerrero at this time.

## 2019-05-22 ENCOUNTER — APPOINTMENT (OUTPATIENT)
Dept: CARDIOLOGY | Facility: CLINIC | Age: 69
End: 2019-05-22

## 2019-05-22 DIAGNOSIS — R11.0 NAUSEA: ICD-10-CM

## 2019-05-22 LAB
ALBUMIN SERPL ELPH-MCNC: 3.7 G/DL — SIGNIFICANT CHANGE UP (ref 3.3–5.2)
ALP SERPL-CCNC: 49 U/L — SIGNIFICANT CHANGE UP (ref 40–120)
ALT FLD-CCNC: 24 U/L — SIGNIFICANT CHANGE UP
AMYLASE P1 CFR SERPL: 76 U/L — SIGNIFICANT CHANGE UP (ref 36–128)
ANION GAP SERPL CALC-SCNC: 12 MMOL/L — SIGNIFICANT CHANGE UP (ref 5–17)
ANION GAP SERPL CALC-SCNC: 13 MMOL/L — SIGNIFICANT CHANGE UP (ref 5–17)
AST SERPL-CCNC: 16 U/L — SIGNIFICANT CHANGE UP
BILIRUB SERPL-MCNC: 0.4 MG/DL — SIGNIFICANT CHANGE UP (ref 0.4–2)
BUN SERPL-MCNC: 16 MG/DL — SIGNIFICANT CHANGE UP (ref 8–20)
BUN SERPL-MCNC: 21 MG/DL — HIGH (ref 8–20)
CALCIUM SERPL-MCNC: 8.3 MG/DL — LOW (ref 8.6–10.2)
CALCIUM SERPL-MCNC: 8.6 MG/DL — SIGNIFICANT CHANGE UP (ref 8.6–10.2)
CHLORIDE SERPL-SCNC: 100 MMOL/L — SIGNIFICANT CHANGE UP (ref 98–107)
CHLORIDE SERPL-SCNC: 104 MMOL/L — SIGNIFICANT CHANGE UP (ref 98–107)
CO2 SERPL-SCNC: 25 MMOL/L — SIGNIFICANT CHANGE UP (ref 22–29)
CO2 SERPL-SCNC: 26 MMOL/L — SIGNIFICANT CHANGE UP (ref 22–29)
CREAT SERPL-MCNC: 0.68 MG/DL — SIGNIFICANT CHANGE UP (ref 0.5–1.3)
CREAT SERPL-MCNC: 1.02 MG/DL — SIGNIFICANT CHANGE UP (ref 0.5–1.3)
GLUCOSE SERPL-MCNC: 116 MG/DL — HIGH (ref 70–115)
GLUCOSE SERPL-MCNC: 98 MG/DL — SIGNIFICANT CHANGE UP (ref 70–115)
HCT VFR BLD CALC: 38.3 % — LOW (ref 42–52)
HGB BLD-MCNC: 12.7 G/DL — LOW (ref 14–18)
LIDOCAIN IGE QN: 92 U/L — HIGH (ref 22–51)
MAGNESIUM SERPL-MCNC: 2 MG/DL — SIGNIFICANT CHANGE UP (ref 1.8–2.6)
MCHC RBC-ENTMCNC: 27.9 PG — SIGNIFICANT CHANGE UP (ref 27–31)
MCHC RBC-ENTMCNC: 33.2 G/DL — SIGNIFICANT CHANGE UP (ref 32–36)
MCV RBC AUTO: 84 FL — SIGNIFICANT CHANGE UP (ref 80–94)
PLATELET # BLD AUTO: 157 K/UL — SIGNIFICANT CHANGE UP (ref 150–400)
POTASSIUM SERPL-MCNC: 3.7 MMOL/L — SIGNIFICANT CHANGE UP (ref 3.5–5.3)
POTASSIUM SERPL-MCNC: 3.8 MMOL/L — SIGNIFICANT CHANGE UP (ref 3.5–5.3)
POTASSIUM SERPL-SCNC: 3.7 MMOL/L — SIGNIFICANT CHANGE UP (ref 3.5–5.3)
POTASSIUM SERPL-SCNC: 3.8 MMOL/L — SIGNIFICANT CHANGE UP (ref 3.5–5.3)
PROT SERPL-MCNC: 6.8 G/DL — SIGNIFICANT CHANGE UP (ref 6.6–8.7)
RBC # BLD: 4.56 M/UL — LOW (ref 4.6–6.2)
RBC # FLD: 13.9 % — SIGNIFICANT CHANGE UP (ref 11–15.6)
SODIUM SERPL-SCNC: 139 MMOL/L — SIGNIFICANT CHANGE UP (ref 135–145)
SODIUM SERPL-SCNC: 141 MMOL/L — SIGNIFICANT CHANGE UP (ref 135–145)
WBC # BLD: 7.9 K/UL — SIGNIFICANT CHANGE UP (ref 4.8–10.8)
WBC # FLD AUTO: 7.9 K/UL — SIGNIFICANT CHANGE UP (ref 4.8–10.8)

## 2019-05-22 PROCEDURE — 93010 ELECTROCARDIOGRAM REPORT: CPT

## 2019-05-22 PROCEDURE — 71045 X-RAY EXAM CHEST 1 VIEW: CPT | Mod: 26

## 2019-05-22 RX ORDER — POTASSIUM CHLORIDE 20 MEQ
20 PACKET (EA) ORAL DAILY
Refills: 0 | Status: DISCONTINUED | OUTPATIENT
Start: 2019-05-22 | End: 2019-05-24

## 2019-05-22 RX ORDER — ONDANSETRON 8 MG/1
4 TABLET, FILM COATED ORAL ONCE
Refills: 0 | Status: COMPLETED | OUTPATIENT
Start: 2019-05-22 | End: 2019-05-22

## 2019-05-22 RX ORDER — SACCHAROMYCES BOULARDII 250 MG
250 POWDER IN PACKET (EA) ORAL
Refills: 0 | Status: DISCONTINUED | OUTPATIENT
Start: 2019-05-22 | End: 2019-05-23

## 2019-05-22 RX ADMIN — Medication 25 MILLIGRAM(S): at 17:29

## 2019-05-22 RX ADMIN — ONDANSETRON 4 MILLIGRAM(S): 8 TABLET, FILM COATED ORAL at 00:55

## 2019-05-22 RX ADMIN — SENNA PLUS 2 TABLET(S): 8.6 TABLET ORAL at 21:50

## 2019-05-22 RX ADMIN — CLOPIDOGREL BISULFATE 75 MILLIGRAM(S): 75 TABLET, FILM COATED ORAL at 09:12

## 2019-05-22 RX ADMIN — ENOXAPARIN SODIUM 40 MILLIGRAM(S): 100 INJECTION SUBCUTANEOUS at 21:52

## 2019-05-22 RX ADMIN — SODIUM CHLORIDE 3 MILLILITER(S): 9 INJECTION INTRAMUSCULAR; INTRAVENOUS; SUBCUTANEOUS at 21:55

## 2019-05-22 RX ADMIN — SODIUM CHLORIDE 3 MILLILITER(S): 9 INJECTION INTRAMUSCULAR; INTRAVENOUS; SUBCUTANEOUS at 13:52

## 2019-05-22 RX ADMIN — Medication 25 MILLIGRAM(S): at 09:08

## 2019-05-22 RX ADMIN — SODIUM CHLORIDE 3 MILLILITER(S): 9 INJECTION INTRAMUSCULAR; INTRAVENOUS; SUBCUTANEOUS at 06:41

## 2019-05-22 RX ADMIN — Medication 325 MILLIGRAM(S): at 09:11

## 2019-05-22 RX ADMIN — Medication 250 MILLIGRAM(S): at 17:29

## 2019-05-22 RX ADMIN — Medication 100 MILLIGRAM(S): at 09:11

## 2019-05-22 RX ADMIN — MODAFINIL 200 MILLIGRAM(S): 200 TABLET ORAL at 09:15

## 2019-05-22 RX ADMIN — Medication 1 TABLET(S): at 17:29

## 2019-05-22 RX ADMIN — Medication 100 MILLIGRAM(S): at 21:50

## 2019-05-22 RX ADMIN — Medication 1 TABLET(S): at 06:46

## 2019-05-22 RX ADMIN — FINASTERIDE 5 MILLIGRAM(S): 5 TABLET, FILM COATED ORAL at 09:11

## 2019-05-22 RX ADMIN — SIMETHICONE 80 MILLIGRAM(S): 80 TABLET, CHEWABLE ORAL at 06:46

## 2019-05-22 RX ADMIN — Medication 20 MILLIEQUIVALENT(S): at 13:53

## 2019-05-22 RX ADMIN — ATORVASTATIN CALCIUM 40 MILLIGRAM(S): 80 TABLET, FILM COATED ORAL at 21:50

## 2019-05-22 RX ADMIN — Medication 100 MILLIGRAM(S): at 09:08

## 2019-05-22 RX ADMIN — Medication 40 MILLIGRAM(S): at 09:09

## 2019-05-22 RX ADMIN — PANTOPRAZOLE SODIUM 40 MILLIGRAM(S): 20 TABLET, DELAYED RELEASE ORAL at 09:09

## 2019-05-22 NOTE — PROGRESS NOTE ADULT - PROBLEM SELECTOR PLAN 3
UA negative.  Urine culture pending.  Hematuria improved.  urology consult and followup appreciated.

## 2019-05-22 NOTE — PROGRESS NOTE ADULT - SUBJECTIVE AND OBJECTIVE BOX
Subjective:68yMale s/p CABG POD#4.  Pt had hematuria after guerrero cath removed on 5/20.  Pt has been voiding well since, urine this morning is yellow/orange in color, no blood.  Pt states he feels he voids well and his bladder is empty after voiding with the exception at night where he still has nocturia occasionally.  Pt states he feels better today, tolerating diet, ambulating with walker, currently OOB in chair.      Vital Signs Last 24 Hrs  T(C): 36.7 (22 May 2019 06:37), Max: 36.9 (21 May 2019 16:26)  T(F): 98.1 (22 May 2019 06:37), Max: 98.4 (21 May 2019 16:26)  HR: 68 (22 May 2019 06:37) (67 - 75)  BP: 135/81 (22 May 2019 06:37) (135/81 - 152/86)  BP(mean): --  RR: 18 (22 May 2019 06:37) (18 - 18)  SpO2: 95% (22 May 2019 06:37) (95% - 98%)  I&O's Detail    21 May 2019 07:01  -  22 May 2019 07:00  --------------------------------------------------------  IN:    Oral Fluid: 960 mL  Total IN: 960 mL    OUT:    Incontinent per Condom Catheter: 1300 mL    Voided: 1600 mL  Total OUT: 2900 mL    Total NET: -1940 mL      22 May 2019 07:01  -  22 May 2019 10:57  --------------------------------------------------------  IN:  Total IN: 0 mL    OUT:    Incontinent per Condom Catheter: 250 mL  Total OUT: 250 mL    Total NET: -250 mL          Labs:                        12.7   7.9   )-----------( 157      ( 22 May 2019 06:28 )             38.3     05-22    141  |  104  |  16.0  ----------------------------<  98  3.8   |  25.0  |  0.68    Ca    8.3<L>      22 May 2019 06:28  Mg     2.0     05-22

## 2019-05-22 NOTE — PROGRESS NOTE ADULT - SUBJECTIVE AND OBJECTIVE BOX
Subjective: Pt sitting up in chair.  Denies SOB or CP.  NAD noted.  Wife at bedside.      Tele:    SR                      T(F): 97.9 (19 @ 10:28), Max: 98.4 (19 @ 16:26)  HR: 67 (19 @ 10:28) (67 - 75)  BP: 127/77 (19 @ 10:28) (127/77 - 152/86)  RR: 18 (19 @ 10:28) (18 - 18)  SpO2: 95% (19 @ 06:37) (95% - 98%) on room air at rest.    Daily     Daily Weight in k.2 (22 May 2019 05:05)    LV EF: 60%    No Known Allergies          141  |  104  |  16.0  ----------------------------<  98  3.8   |  25.0  |  0.68    Ca    8.3<L>      22 May 2019 06:28  Mg     2.0                                      12.7   7.9   )-----------( 157      ( 22 May 2019 06:28 )             38.3          CXR: < from: Xray Chest 1 View- PORTABLE-Routine (19 @ 05:09) >  EXAM:  XR CHEST PORTABLE ROUTINE 1V                          PROCEDURE DATE:  2019      INTERPRETATION:  TECHNIQUE: Single portable view of the chest.    COMPARISON: 2019    CLINICAL HISTORY: Chest pains/p ohs    FINDINGS:    Singlefrontal view of the chest demonstrates mild left basilar   atelectasis. The cardiomediastinal silhouette is enlarged. No acute   osseous abnormalities. Overlying EKG leads and wires are noted.   Mediastinal sternotomy wires.    IMPRESSION: Mild left basilar atelectasis.    LÓPEZ CLARK M.D., ATTENDING RADIOLOGIST  This document has been electronically signed. May 22 2019 12:51PM    < end of copied text >      I&O's Detail    21 May 2019 07:01  -  22 May 2019 07:00  --------------------------------------------------------  IN:    Oral Fluid: 960 mL  Total IN: 960 mL    OUT:    Incontinent per Condom Catheter: 1300 mL    Voided: 1600 mL  Total OUT: 2900 mL    Total NET: -1940 mL      22 May 2019 07:01  -  22 May 2019 13:03  --------------------------------------------------------  IN:  Total IN: 0 mL    OUT:    Incontinent per Condom Catheter: 750 mL  Total OUT: 750 mL    Total NET: -750 mL    CHEST TUBE:  [ ] YES [x ] NO  OUTPUT:     per 24 hours    AIR LEAKS:  [ ] YES [ ] NO      BERNIE DRAIN:   [ ] YES [x ] NO  OUTPUT:     per 24 hours    EPICARDIAL WIRES:  [ x] YES [ ] NO      BOWEL MOVEMENT:  [x ] YES [ ] NO        Active Medications:  ALPRAZolam 0.25 milliGRAM(s) Oral every 12 hours PRN  aspirin enteric coated 325 milliGRAM(s) Oral daily  atorvastatin 40 milliGRAM(s) Oral at bedtime  clopidogrel Tablet 75 milliGRAM(s) Oral daily  docusate sodium 100 milliGRAM(s) Oral three times a day  enoxaparin Injectable 40 milliGRAM(s) SubCutaneous daily  finasteride 5 milliGRAM(s) Oral daily  furosemide    Tablet 40 milliGRAM(s) Oral daily  metoprolol tartrate 25 milliGRAM(s) Oral two times a day  modafinil 200 milliGRAM(s) Oral daily  oxyCODONE    5 mG/acetaminophen 325 mG 1 Tablet(s) Oral every 4 hours PRN  oxyCODONE    5 mG/acetaminophen 325 mG 2 Tablet(s) Oral every 4 hours PRN  pantoprazole    Tablet 40 milliGRAM(s) Oral before breakfast  phenazopyridine 100 milliGRAM(s) Oral every 8 hours  polyethylene glycol 3350 17 Gram(s) Oral at bedtime  senna 2 Tablet(s) Oral at bedtime  simethicone 80 milliGRAM(s) Chew three times a day PRN  sodium chloride 0.9% lock flush 3 milliLiter(s) IV Push every 8 hours  trimethoprim  160 mG/sulfamethoxazole 800 mG 1 Tablet(s) Oral two times a day      Physical Exam:    Neuro: AAOX3.  Non focal.    Pulm: Diminished.      CV: RRR.  +S1+S2.  +PW (isolated)    Abd: Softly distended/NT.  +BS. +BM / per pt.    Extremities: +1 edema BLE.  +pp    Incision(s): MSI prevena removed (device was beeping constantly)> Island dressing placed.  Sternum stable.  CT sites with dsd c/d/i.                            PAST MEDICAL & SURGICAL HISTORY:  CAD S/P percutaneous coronary angioplasty  GERD (gastroesophageal reflux disease)  SOB (shortness of breath)  Chest pain  BPH (benign prostatic hyperplasia)  Hyperlipidemia  Hypertension  H/O hernia repair

## 2019-05-22 NOTE — PROGRESS NOTE ADULT - ASSESSMENT
67 yo male with know h/o BPH, had hematuria after guerrero cath removed. Hematuria resoled, voiding well  - f/u Ucx  - cont proscar  - f/u renal US

## 2019-05-23 LAB
ALBUMIN SERPL ELPH-MCNC: 3.5 G/DL — SIGNIFICANT CHANGE UP (ref 3.3–5.2)
ALP SERPL-CCNC: 49 U/L — SIGNIFICANT CHANGE UP (ref 40–120)
ALT FLD-CCNC: 23 U/L — SIGNIFICANT CHANGE UP
AMYLASE P1 CFR SERPL: 66 U/L — SIGNIFICANT CHANGE UP (ref 36–128)
ANION GAP SERPL CALC-SCNC: 10 MMOL/L — SIGNIFICANT CHANGE UP (ref 5–17)
AST SERPL-CCNC: 19 U/L — SIGNIFICANT CHANGE UP
BILIRUB SERPL-MCNC: 0.5 MG/DL — SIGNIFICANT CHANGE UP (ref 0.4–2)
BUN SERPL-MCNC: 18 MG/DL — SIGNIFICANT CHANGE UP (ref 8–20)
CALCIUM SERPL-MCNC: 9.3 MG/DL — SIGNIFICANT CHANGE UP (ref 8.6–10.2)
CHLORIDE SERPL-SCNC: 103 MMOL/L — SIGNIFICANT CHANGE UP (ref 98–107)
CO2 SERPL-SCNC: 26 MMOL/L — SIGNIFICANT CHANGE UP (ref 22–29)
CREAT SERPL-MCNC: 0.92 MG/DL — SIGNIFICANT CHANGE UP (ref 0.5–1.3)
GLUCOSE SERPL-MCNC: 103 MG/DL — SIGNIFICANT CHANGE UP (ref 70–115)
HCT VFR BLD CALC: 41.3 % — LOW (ref 42–52)
HGB BLD-MCNC: 13.7 G/DL — LOW (ref 14–18)
LIDOCAIN IGE QN: 85 U/L — HIGH (ref 22–51)
MAGNESIUM SERPL-MCNC: 2 MG/DL — SIGNIFICANT CHANGE UP (ref 1.6–2.6)
MCHC RBC-ENTMCNC: 27.9 PG — SIGNIFICANT CHANGE UP (ref 27–31)
MCHC RBC-ENTMCNC: 33.2 G/DL — SIGNIFICANT CHANGE UP (ref 32–36)
MCV RBC AUTO: 84.1 FL — SIGNIFICANT CHANGE UP (ref 80–94)
PHOSPHATE SERPL-MCNC: 3.5 MG/DL — SIGNIFICANT CHANGE UP (ref 2.4–4.7)
PLATELET # BLD AUTO: 192 K/UL — SIGNIFICANT CHANGE UP (ref 150–400)
POTASSIUM SERPL-MCNC: 5.1 MMOL/L — SIGNIFICANT CHANGE UP (ref 3.5–5.3)
POTASSIUM SERPL-SCNC: 5.1 MMOL/L — SIGNIFICANT CHANGE UP (ref 3.5–5.3)
PROT SERPL-MCNC: 6.6 G/DL — SIGNIFICANT CHANGE UP (ref 6.6–8.7)
RBC # BLD: 4.91 M/UL — SIGNIFICANT CHANGE UP (ref 4.6–6.2)
RBC # FLD: 13.9 % — SIGNIFICANT CHANGE UP (ref 11–15.6)
SODIUM SERPL-SCNC: 139 MMOL/L — SIGNIFICANT CHANGE UP (ref 135–145)
WBC # BLD: 9.6 K/UL — SIGNIFICANT CHANGE UP (ref 4.8–10.8)
WBC # FLD AUTO: 9.6 K/UL — SIGNIFICANT CHANGE UP (ref 4.8–10.8)

## 2019-05-23 PROCEDURE — 76775 US EXAM ABDO BACK WALL LIM: CPT | Mod: 26

## 2019-05-23 PROCEDURE — 93308 TTE F-UP OR LMTD: CPT | Mod: 26

## 2019-05-23 PROCEDURE — 71045 X-RAY EXAM CHEST 1 VIEW: CPT | Mod: 26

## 2019-05-23 RX ORDER — ACETAMINOPHEN 500 MG
650 TABLET ORAL EVERY 6 HOURS
Refills: 0 | Status: DISCONTINUED | OUTPATIENT
Start: 2019-05-23 | End: 2019-05-24

## 2019-05-23 RX ORDER — MODAFINIL 200 MG/1
200 TABLET ORAL DAILY
Refills: 0 | Status: DISCONTINUED | OUTPATIENT
Start: 2019-05-23 | End: 2019-05-24

## 2019-05-23 RX ORDER — SERTRALINE 25 MG/1
25 TABLET, FILM COATED ORAL DAILY
Refills: 0 | Status: DISCONTINUED | OUTPATIENT
Start: 2019-05-23 | End: 2019-05-24

## 2019-05-23 RX ADMIN — SENNA PLUS 2 TABLET(S): 8.6 TABLET ORAL at 22:16

## 2019-05-23 RX ADMIN — SERTRALINE 25 MILLIGRAM(S): 25 TABLET, FILM COATED ORAL at 18:51

## 2019-05-23 RX ADMIN — ATORVASTATIN CALCIUM 40 MILLIGRAM(S): 80 TABLET, FILM COATED ORAL at 22:16

## 2019-05-23 RX ADMIN — Medication 250 MILLIGRAM(S): at 09:48

## 2019-05-23 RX ADMIN — Medication 325 MILLIGRAM(S): at 09:50

## 2019-05-23 RX ADMIN — Medication 1 TABLET(S): at 09:48

## 2019-05-23 RX ADMIN — SODIUM CHLORIDE 3 MILLILITER(S): 9 INJECTION INTRAMUSCULAR; INTRAVENOUS; SUBCUTANEOUS at 14:02

## 2019-05-23 RX ADMIN — Medication 40 MILLIGRAM(S): at 09:49

## 2019-05-23 RX ADMIN — MODAFINIL 200 MILLIGRAM(S): 200 TABLET ORAL at 09:48

## 2019-05-23 RX ADMIN — CLOPIDOGREL BISULFATE 75 MILLIGRAM(S): 75 TABLET, FILM COATED ORAL at 09:49

## 2019-05-23 RX ADMIN — PANTOPRAZOLE SODIUM 40 MILLIGRAM(S): 20 TABLET, DELAYED RELEASE ORAL at 09:50

## 2019-05-23 RX ADMIN — Medication 25 MILLIGRAM(S): at 09:49

## 2019-05-23 RX ADMIN — SODIUM CHLORIDE 3 MILLILITER(S): 9 INJECTION INTRAMUSCULAR; INTRAVENOUS; SUBCUTANEOUS at 05:47

## 2019-05-23 RX ADMIN — Medication 20 MILLIEQUIVALENT(S): at 09:49

## 2019-05-23 RX ADMIN — FINASTERIDE 5 MILLIGRAM(S): 5 TABLET, FILM COATED ORAL at 09:48

## 2019-05-23 RX ADMIN — ENOXAPARIN SODIUM 40 MILLIGRAM(S): 100 INJECTION SUBCUTANEOUS at 22:16

## 2019-05-23 RX ADMIN — Medication 25 MILLIGRAM(S): at 17:23

## 2019-05-23 RX ADMIN — SODIUM CHLORIDE 3 MILLILITER(S): 9 INJECTION INTRAMUSCULAR; INTRAVENOUS; SUBCUTANEOUS at 22:15

## 2019-05-23 RX ADMIN — Medication 100 MILLIGRAM(S): at 22:16

## 2019-05-23 NOTE — PROGRESS NOTE ADULT - PROBLEM SELECTOR PLAN 2
using Home cpap machine   cont provigil (takes at home) using Home cpap machine   Hold provigil for now.

## 2019-05-23 NOTE — PROGRESS NOTE ADULT - PROBLEM SELECTOR PLAN 3
UA negative.  Urine culture pending.  Hematuria improved.  urology consult and followup appreciated. UA negative.  Urine culture pending.  Hematuria improved.  urology consult and followup appreciated.  Renal ultrasound complete: no hydronephrosis  Stopping empiric abx

## 2019-05-23 NOTE — PROGRESS NOTE ADULT - ASSESSMENT
68 year old male with PMH CAD, PCI, GERD, BPH, HTN, HLD p/w midsternal chest discomfort > cath revealed MVD.    5/18 s/p CABG x 3 (LIMA to LAD, SVG to OM, SVG to ramus) with Dr Estrada    Postoperative course notable for bradycardia immediately postop, now resolved. Patient also had some hematuria and dysuria. 68 year old male with PMH CAD, PCI, GERD, BPH, HTN, HLD p/w midsternal chest discomfort > cath revealed MVD.    5/18 s/p CABG x 3 (LIMA to LAD, SVG to OM, SVG to ramus) with Dr Estrada    Postoperative course notable for bradycardia immediately postop, now resolved. Patient also had some hematuria and dysuria. Patient has also developed some nausea and dizziness, worse since antibiotic started empirically for hematuria/dysuria > stopped. No hypotension. Isolated lipase elevation > will monitor    Overall Plan  Will reduce any unnecessary medications: stop provigil, abx  Trend LFTs/amylase/lipase  Echo today to r/o pericardial effusion  No narcotics  For discharge to home today or tomorrow (Patient prefers to wait a day)  D/W Dr Estrada in AM rounds.

## 2019-05-23 NOTE — PROGRESS NOTE ADULT - PROBLEM SELECTOR PLAN 8
C/O nausea > states that his stomach isn't used to "so many pills".  Check LFT's, Amylase, lipase. C/O nausea > states that his stomach isn't used to "so many pills".  Check LFT's, Amylase, lipase > stable/improving lipase

## 2019-05-23 NOTE — PROGRESS NOTE ADULT - SUBJECTIVE AND OBJECTIVE BOX
Subjective: "I feel dizzy and lightheaded when I stand. I am having some diarrhea. I just feel off" NAD seated in chair, no sob, cp.     VITAL SIGNS  Vital Signs Last 24 Hrs  T(C): 36.4 (19 @ 10:04), Max: 36.9 (19 @ 21:45)  T(F): 97.5 (19 @ 10:04), Max: 98.4 (19 @ 21:45)  HR: 61 (19 @ 10:04) (61 - 75)  BP: 126/66 (19 @ 10:04) (125/82 - 158/84)  RR: 18 (19 @ 10:04) (18 - 18)  SpO2: 95% (19 @ 09:54) (95% - 98%)  on RA              Telemetry/Alarms:  SR  LVEF: 60    MEDICATIONS  acetaminophen   Tablet .. 650 milliGRAM(s) Oral every 6 hours PRN  aspirin enteric coated 325 milliGRAM(s) Oral daily  atorvastatin 40 milliGRAM(s) Oral at bedtime  clopidogrel Tablet 75 milliGRAM(s) Oral daily  docusate sodium 100 milliGRAM(s) Oral three times a day  enoxaparin Injectable 40 milliGRAM(s) SubCutaneous daily  finasteride 5 milliGRAM(s) Oral daily  furosemide    Tablet 40 milliGRAM(s) Oral daily  metoprolol tartrate 25 milliGRAM(s) Oral two times a day  pantoprazole    Tablet 40 milliGRAM(s) Oral before breakfast  polyethylene glycol 3350 17 Gram(s) Oral at bedtime  potassium chloride    Tablet ER 20 milliEquivalent(s) Oral daily  senna 2 Tablet(s) Oral at bedtime  simethicone 80 milliGRAM(s) Chew three times a day PRN  sodium chloride 0.9% lock flush 3 milliLiter(s) IV Push every 8 hours      PHYSICAL EXAM  General: well nourished, well developed, no acute distress  Neurology: alert and oriented x 3, nonfocal, no gross deficits  Respiratory: clear to auscultation bilaterally  CV: regular rate and rhythm, normal S1, S2  Abdomen: soft, nontender, nondistended, positive bowel sounds, last bowel movement today  Extremities: warm, well perfused. +1-2 edema. + DP pulses  Incisions: midline sternal incision, + coverlet > removed, c/d/i. sternum stable. R LE extended EVH incision with upper thigh and lower ankle stab sites c/d/i, thigh swelling, LLE open harvest site with posterior ecchymosis and swelling.  Chest tubes: sites slightly        @ 07:01  -   @ 07:00  --------------------------------------------------------  IN: 100 mL / OUT: 750 mL / NET: -650 mL        Weights:  Daily     Daily Weight in k.2 (23 May 2019 04:55)  Admit Wt: Drug Dosing Weight  Height (cm): 167.6 (17 May 2019 00:00)  Weight (kg): 92.1 (17 May 2019 00:00)  BMI (kg/m2): 32.8 (17 May 2019 00:00)  BSA (m2): 2.01 (17 May 2019 00:00)    All laboratory results, radiology and medications reviewed.    LABS      139  |  103  |  18.0  ----------------------------<  103  5.1   |  26.0  |  0.92    Ca    9.3      23 May 2019 05:54  Phos  3.5       Mg     2.0         TPro  6.6  /  Alb  3.5  /  TBili  0.5  /  DBili  x   /  AST  19  /  ALT  23  /  AlkPhos  49                                   13.7   9.6   )-----------( 192      ( 23 May 2019 05:54 )             41.3            Bilirubin Total, Serum: 0.5 mg/dL ( @ 05:54)  Bilirubin Total, Serum: 0.4 mg/dL ( @ 17:38)    CAPILLARY BLOOD GLUCOSE               Today's CXR:     Today's EKG:    PAST MEDICAL & SURGICAL HISTORY:  CAD S/P percutaneous coronary angioplasty  GERD (gastroesophageal reflux disease)  SOB (shortness of breath)  Chest pain  BPH (benign prostatic hyperplasia)  Hyperlipidemia  Hypertension  H/O hernia repair Subjective: "I feel dizzy and lightheaded when I stand. I am having some diarrhea. I just feel off" NAD seated in chair, no sob, cp.     VITAL SIGNS  Vital Signs Last 24 Hrs  T(C): 36.4 (19 @ 10:04), Max: 36.9 (19 @ 21:45)  T(F): 97.5 (19 @ 10:04), Max: 98.4 (19 @ 21:45)  HR: 61 (19 @ 10:04) (61 - 75)  BP: 126/66 (19 @ 10:04) (125/82 - 158/84)  RR: 18 (19 @ 10:04) (18 - 18)  SpO2: 95% (19 @ 09:54) (95% - 98%)  on RA              Telemetry/Alarms:  SR  LVEF: 60    MEDICATIONS  acetaminophen   Tablet .. 650 milliGRAM(s) Oral every 6 hours PRN  aspirin enteric coated 325 milliGRAM(s) Oral daily  atorvastatin 40 milliGRAM(s) Oral at bedtime  clopidogrel Tablet 75 milliGRAM(s) Oral daily  docusate sodium 100 milliGRAM(s) Oral three times a day  enoxaparin Injectable 40 milliGRAM(s) SubCutaneous daily  finasteride 5 milliGRAM(s) Oral daily  furosemide    Tablet 40 milliGRAM(s) Oral daily  metoprolol tartrate 25 milliGRAM(s) Oral two times a day  pantoprazole    Tablet 40 milliGRAM(s) Oral before breakfast  polyethylene glycol 3350 17 Gram(s) Oral at bedtime  potassium chloride    Tablet ER 20 milliEquivalent(s) Oral daily  senna 2 Tablet(s) Oral at bedtime  simethicone 80 milliGRAM(s) Chew three times a day PRN  sodium chloride 0.9% lock flush 3 milliLiter(s) IV Push every 8 hours      PHYSICAL EXAM  General: well nourished, well developed, no acute distress  Neurology: alert and oriented x 3, nonfocal, no gross deficits  Respiratory: clear to auscultation bilaterally  CV: regular rate and rhythm, normal S1, S2  Abdomen: soft, nontender, nondistended, positive bowel sounds, last bowel movement today  Extremities: warm, well perfused. +1-2 edema. + DP pulses  Incisions: midline sternal incision, + coverlet > removed, c/d/i. sternum stable. R LE extended EVH incision with upper thigh and lower ankle stab sites c/d/i, thigh swelling, LLE open harvest site with posterior ecchymosis and swelling.  Chest tubes: sites slightly        @ 07:01  -   @ 07:00  --------------------------------------------------------  IN: 100 mL / OUT: 750 mL / NET: -650 mL        Weights:  Daily     Daily Weight in k.2 (23 May 2019 04:55)  Admit Wt: Drug Dosing Weight  Height (cm): 167.6 (17 May 2019 00:00)  Weight (kg): 92.1 (17 May 2019 00:00)  BMI (kg/m2): 32.8 (17 May 2019 00:00)  BSA (m2): 2.01 (17 May 2019 00:00)    All laboratory results, radiology and medications reviewed.    LABS      139  |  103  |  18.0  ----------------------------<  103  5.1   |  26.0  |  0.92    Ca    9.3      23 May 2019 05:54  Phos  3.5       Mg     2.0         TPro  6.6  /  Alb  3.5  /  TBili  0.5  /  DBili  x   /  AST  19  /  ALT  23  /  AlkPhos  49                                   13.7   9.6   )-----------( 192      ( 23 May 2019 05:54 )             41.3            Bilirubin Total, Serum: 0.5 mg/dL ( @ 05:54)  Bilirubin Total, Serum: 0.4 mg/dL ( @ 17:38)    CAPILLARY BLOOD GLUCOSE               Today's CXR: < from: Xray Chest 1 View- PORTABLE-Routine (19 @ 06:29) >    Findings:    Lungs/Pleura:  Left basilar atelectasis again seen.    Heart/Mediastinum:  The heart is enlarged.    Bones:  Status post sternotomy.    Impression:    Left basilar atelectasis, unchanged since 2019.    < end of copied text >      Today's EKG:< from: 12 Lead ECG (19 @ 08:01) >      Diagnosis Line Normal sinus rhythm  Cannot rule out Inferior infarct , age undetermined    < end of copied text >      PAST MEDICAL & SURGICAL HISTORY:  CAD S/P percutaneous coronary angioplasty  GERD (gastroesophageal reflux disease)  SOB (shortness of breath)  Chest pain  BPH (benign prostatic hyperplasia)  Hyperlipidemia  Hypertension  H/O hernia repair

## 2019-05-24 ENCOUNTER — TRANSCRIPTION ENCOUNTER (OUTPATIENT)
Age: 69
End: 2019-05-24

## 2019-05-24 VITALS
HEART RATE: 71 BPM | TEMPERATURE: 98 F | RESPIRATION RATE: 18 BRPM | SYSTOLIC BLOOD PRESSURE: 119 MMHG | DIASTOLIC BLOOD PRESSURE: 69 MMHG

## 2019-05-24 PROCEDURE — 85014 HEMATOCRIT: CPT

## 2019-05-24 PROCEDURE — 84295 ASSAY OF SERUM SODIUM: CPT

## 2019-05-24 PROCEDURE — 86923 COMPATIBILITY TEST ELECTRIC: CPT

## 2019-05-24 PROCEDURE — 82962 GLUCOSE BLOOD TEST: CPT

## 2019-05-24 PROCEDURE — 71045 X-RAY EXAM CHEST 1 VIEW: CPT

## 2019-05-24 PROCEDURE — C1889: CPT

## 2019-05-24 PROCEDURE — 80053 COMPREHEN METABOLIC PANEL: CPT

## 2019-05-24 PROCEDURE — 82435 ASSAY OF BLOOD CHLORIDE: CPT

## 2019-05-24 PROCEDURE — 76775 US EXAM ABDO BACK WALL LIM: CPT

## 2019-05-24 PROCEDURE — 84484 ASSAY OF TROPONIN QUANT: CPT

## 2019-05-24 PROCEDURE — 94640 AIRWAY INHALATION TREATMENT: CPT

## 2019-05-24 PROCEDURE — 85384 FIBRINOGEN ACTIVITY: CPT

## 2019-05-24 PROCEDURE — 93880 EXTRACRANIAL BILAT STUDY: CPT

## 2019-05-24 PROCEDURE — 82803 BLOOD GASES ANY COMBINATION: CPT

## 2019-05-24 PROCEDURE — C1769: CPT

## 2019-05-24 PROCEDURE — 93308 TTE F-UP OR LMTD: CPT

## 2019-05-24 PROCEDURE — C1760: CPT

## 2019-05-24 PROCEDURE — 99153 MOD SED SAME PHYS/QHP EA: CPT

## 2019-05-24 PROCEDURE — 87640 STAPH A DNA AMP PROBE: CPT

## 2019-05-24 PROCEDURE — 83036 HEMOGLOBIN GLYCOSYLATED A1C: CPT

## 2019-05-24 PROCEDURE — 84480 ASSAY TRIIODOTHYRONINE (T3): CPT

## 2019-05-24 PROCEDURE — 84100 ASSAY OF PHOSPHORUS: CPT

## 2019-05-24 PROCEDURE — 81001 URINALYSIS AUTO W/SCOPE: CPT

## 2019-05-24 PROCEDURE — 83605 ASSAY OF LACTIC ACID: CPT

## 2019-05-24 PROCEDURE — 97110 THERAPEUTIC EXERCISES: CPT

## 2019-05-24 PROCEDURE — 85610 PROTHROMBIN TIME: CPT

## 2019-05-24 PROCEDURE — C1894: CPT

## 2019-05-24 PROCEDURE — 86850 RBC ANTIBODY SCREEN: CPT

## 2019-05-24 PROCEDURE — 85576 BLOOD PLATELET AGGREGATION: CPT

## 2019-05-24 PROCEDURE — 80048 BASIC METABOLIC PNL TOTAL CA: CPT

## 2019-05-24 PROCEDURE — 82947 ASSAY GLUCOSE BLOOD QUANT: CPT

## 2019-05-24 PROCEDURE — C1713: CPT

## 2019-05-24 PROCEDURE — 97163 PT EVAL HIGH COMPLEX 45 MIN: CPT

## 2019-05-24 PROCEDURE — 76937 US GUIDE VASCULAR ACCESS: CPT

## 2019-05-24 PROCEDURE — 80061 LIPID PANEL: CPT

## 2019-05-24 PROCEDURE — 93320 DOPPLER ECHO COMPLETE: CPT

## 2019-05-24 PROCEDURE — 93005 ELECTROCARDIOGRAM TRACING: CPT

## 2019-05-24 PROCEDURE — 94010 BREATHING CAPACITY TEST: CPT

## 2019-05-24 PROCEDURE — 83690 ASSAY OF LIPASE: CPT

## 2019-05-24 PROCEDURE — 93306 TTE W/DOPPLER COMPLETE: CPT

## 2019-05-24 PROCEDURE — 84132 ASSAY OF SERUM POTASSIUM: CPT

## 2019-05-24 PROCEDURE — 93312 ECHO TRANSESOPHAGEAL: CPT

## 2019-05-24 PROCEDURE — 36415 COLL VENOUS BLD VENIPUNCTURE: CPT

## 2019-05-24 PROCEDURE — 85027 COMPLETE CBC AUTOMATED: CPT

## 2019-05-24 PROCEDURE — 94002 VENT MGMT INPAT INIT DAY: CPT

## 2019-05-24 PROCEDURE — 84439 ASSAY OF FREE THYROXINE: CPT

## 2019-05-24 PROCEDURE — P9045: CPT

## 2019-05-24 PROCEDURE — 85730 THROMBOPLASTIN TIME PARTIAL: CPT

## 2019-05-24 PROCEDURE — 83735 ASSAY OF MAGNESIUM: CPT

## 2019-05-24 PROCEDURE — C1887: CPT

## 2019-05-24 PROCEDURE — 97530 THERAPEUTIC ACTIVITIES: CPT

## 2019-05-24 PROCEDURE — 83880 ASSAY OF NATRIURETIC PEPTIDE: CPT

## 2019-05-24 PROCEDURE — 94760 N-INVAS EAR/PLS OXIMETRY 1: CPT

## 2019-05-24 PROCEDURE — 82550 ASSAY OF CK (CPK): CPT

## 2019-05-24 PROCEDURE — 86900 BLOOD TYPING SEROLOGIC ABO: CPT

## 2019-05-24 PROCEDURE — 94660 CPAP INITIATION&MGMT: CPT

## 2019-05-24 PROCEDURE — 82248 BILIRUBIN DIRECT: CPT

## 2019-05-24 PROCEDURE — 97116 GAIT TRAINING THERAPY: CPT

## 2019-05-24 PROCEDURE — 82150 ASSAY OF AMYLASE: CPT

## 2019-05-24 PROCEDURE — 99152 MOD SED SAME PHYS/QHP 5/>YRS: CPT

## 2019-05-24 PROCEDURE — 93458 L HRT ARTERY/VENTRICLE ANGIO: CPT

## 2019-05-24 PROCEDURE — 93325 DOPPLER ECHO COLOR FLOW MAPG: CPT

## 2019-05-24 PROCEDURE — 84443 ASSAY THYROID STIM HORMONE: CPT

## 2019-05-24 PROCEDURE — 82330 ASSAY OF CALCIUM: CPT

## 2019-05-24 PROCEDURE — 86901 BLOOD TYPING SEROLOGIC RH(D): CPT

## 2019-05-24 PROCEDURE — 84134 ASSAY OF PREALBUMIN: CPT

## 2019-05-24 PROCEDURE — 82553 CREATINE MB FRACTION: CPT

## 2019-05-24 PROCEDURE — 87641 MR-STAPH DNA AMP PROBE: CPT

## 2019-05-24 PROCEDURE — 83520 IMMUNOASSAY QUANT NOS NONAB: CPT

## 2019-05-24 RX ORDER — METOPROLOL TARTRATE 50 MG
1 TABLET ORAL
Qty: 60 | Refills: 1
Start: 2019-05-24 | End: 2019-07-22

## 2019-05-24 RX ORDER — NITROGLYCERIN 6.5 MG
1 CAPSULE, EXTENDED RELEASE ORAL
Qty: 0 | Refills: 0 | DISCHARGE

## 2019-05-24 RX ORDER — ACETAMINOPHEN 500 MG
2 TABLET ORAL
Qty: 0 | Refills: 0 | DISCHARGE
Start: 2019-05-24

## 2019-05-24 RX ORDER — FUROSEMIDE 40 MG
1 TABLET ORAL
Qty: 14 | Refills: 0
Start: 2019-05-24 | End: 2019-06-06

## 2019-05-24 RX ORDER — CLOPIDOGREL BISULFATE 75 MG/1
1 TABLET, FILM COATED ORAL
Qty: 0 | Refills: 0 | DISCHARGE

## 2019-05-24 RX ORDER — POTASSIUM CHLORIDE 20 MEQ
1 PACKET (EA) ORAL
Qty: 14 | Refills: 0
Start: 2019-05-24 | End: 2019-06-06

## 2019-05-24 RX ORDER — DOCUSATE SODIUM 100 MG
1 CAPSULE ORAL
Qty: 0 | Refills: 0 | DISCHARGE
Start: 2019-05-24

## 2019-05-24 RX ORDER — ATENOLOL 25 MG/1
0 TABLET ORAL
Qty: 0 | Refills: 0 | DISCHARGE

## 2019-05-24 RX ORDER — AMLODIPINE BESYLATE AND BENAZEPRIL HYDROCHLORIDE 10; 20 MG/1; MG/1
1 CAPSULE ORAL
Qty: 0 | Refills: 0 | DISCHARGE

## 2019-05-24 RX ORDER — SERTRALINE 25 MG/1
1 TABLET, FILM COATED ORAL
Qty: 30 | Refills: 1
Start: 2019-05-24 | End: 2019-07-22

## 2019-05-24 RX ORDER — PANTOPRAZOLE SODIUM 20 MG/1
1 TABLET, DELAYED RELEASE ORAL
Qty: 30 | Refills: 1
Start: 2019-05-24 | End: 2019-07-22

## 2019-05-24 RX ORDER — ASPIRIN/CALCIUM CARB/MAGNESIUM 324 MG
1 TABLET ORAL
Qty: 30 | Refills: 1
Start: 2019-05-24 | End: 2019-07-22

## 2019-05-24 RX ORDER — CLOPIDOGREL BISULFATE 75 MG/1
1 TABLET, FILM COATED ORAL
Qty: 30 | Refills: 1
Start: 2019-05-24 | End: 2019-07-22

## 2019-05-24 RX ORDER — PANTOPRAZOLE SODIUM 20 MG/1
1 TABLET, DELAYED RELEASE ORAL
Qty: 0 | Refills: 0 | DISCHARGE

## 2019-05-24 RX ADMIN — PANTOPRAZOLE SODIUM 40 MILLIGRAM(S): 20 TABLET, DELAYED RELEASE ORAL at 11:55

## 2019-05-24 RX ADMIN — SODIUM CHLORIDE 3 MILLILITER(S): 9 INJECTION INTRAMUSCULAR; INTRAVENOUS; SUBCUTANEOUS at 06:24

## 2019-05-24 RX ADMIN — CLOPIDOGREL BISULFATE 75 MILLIGRAM(S): 75 TABLET, FILM COATED ORAL at 11:59

## 2019-05-24 RX ADMIN — MODAFINIL 200 MILLIGRAM(S): 200 TABLET ORAL at 12:03

## 2019-05-24 RX ADMIN — SERTRALINE 25 MILLIGRAM(S): 25 TABLET, FILM COATED ORAL at 12:00

## 2019-05-24 RX ADMIN — Medication 100 MILLIGRAM(S): at 11:54

## 2019-05-24 RX ADMIN — ENOXAPARIN SODIUM 40 MILLIGRAM(S): 100 INJECTION SUBCUTANEOUS at 12:00

## 2019-05-24 RX ADMIN — Medication 25 MILLIGRAM(S): at 11:55

## 2019-05-24 RX ADMIN — FINASTERIDE 5 MILLIGRAM(S): 5 TABLET, FILM COATED ORAL at 11:59

## 2019-05-24 RX ADMIN — Medication 20 MILLIEQUIVALENT(S): at 12:00

## 2019-05-24 RX ADMIN — Medication 325 MILLIGRAM(S): at 12:00

## 2019-05-24 RX ADMIN — Medication 40 MILLIGRAM(S): at 11:55

## 2019-05-24 NOTE — PROGRESS NOTE ADULT - PROBLEM SELECTOR PROBLEM 7
Benign prostatic hyperplasia, unspecified whether lower urinary tract symptoms present
Prophylactic measure
Prophylactic measure
Benign prostatic hyperplasia, unspecified whether lower urinary tract symptoms present

## 2019-05-24 NOTE — PROGRESS NOTE ADULT - SUBJECTIVE AND OBJECTIVE BOX
Subjective:68yMale s/p CABG, pt had hematuria afer guerrero cath removed, now resolved. Pt voiding, adequate UO.      Vital Signs Last 24 Hrs  T(C): 36.8 (24 May 2019 10:00), Max: 36.9 (24 May 2019 05:49)  T(F): 98.3 (24 May 2019 10:00), Max: 98.5 (24 May 2019 05:49)  HR: 75 (24 May 2019 10:00) (71 - 79)  BP: 126/73 (24 May 2019 10:00) (106/70 - 147/85)  BP(mean): --  RR: 19 (24 May 2019 10:00) (17 - 19)  SpO2: 97% (24 May 2019 10:00) (95% - 97%)  I&O's Detail    23 May 2019 07:01  -  24 May 2019 07:00  --------------------------------------------------------  IN:    Oral Fluid: 580 mL  Total IN: 580 mL    OUT:    Voided: 1975 mL  Total OUT: 1975 mL    Total NET: -1395 mL          Labs:                        13.7   9.6   )-----------( 192      ( 23 May 2019 05:54 )             41.3     05-23    139  |  103  |  18.0  ----------------------------<  103  5.1   |  26.0  |  0.92    Ca    9.3      23 May 2019 05:54  Phos  3.5     05-23  Mg     2.0     05-23    TPro  6.6  /  Alb  3.5  /  TBili  0.5  /  DBili  x   /  AST  19  /  ALT  23  /  AlkPhos  49  05-23      radiology< from: US Renal (05.23.19 @ 10:35) >   EXAM:  US KIDNEY(S)                          PROCEDURE DATE:  05/23/2019          INTERPRETATION:  CLINICAL INFORMATION: Status post CABG. Known BPH.   Hematuria.    COMPARISON: None available.    TECHNIQUE: Sonography of the kidneys.    FINDINGS:    Right kidney:  11.8 cm. No hydronephrosis or calculus. 1.5 cm cyst in the   mid kidney.    Left kidney:  11.4 cm. No hydronephrosis or calculus. 1.9 cm cyst in the   mid kidney.      IMPRESSION:     No hydronephrosis.    < end of copied text >

## 2019-05-24 NOTE — PROGRESS NOTE ADULT - PROBLEM SELECTOR PLAN 9
Protonix for stress ulcer prophylaxis.  Lovenox/SCDs for DVT prophylaxis.    Discussed with Dr. Estrada in Am rounds.
Protonix for stress ulcer prophylaxis.  Lovenox/SCDs for DVT prophylaxis.    Discussed with Dr. Estrada in Am rounds.
Protonix for stress ulcer prophylaxis.  Lovenox/SCDs for DVT prophylaxis.    Discharge home today.  Discussed with Dr. Estrada in Am rounds.

## 2019-05-24 NOTE — PROGRESS NOTE ADULT - PROBLEM SELECTOR PLAN 5
continue statin
continue statin
Nightly CPAP
continue protonix
continue protonix
continue statin
continue statin

## 2019-05-24 NOTE — PROGRESS NOTE ADULT - PROBLEM SELECTOR PROBLEM 3
Hyperlipidemia, unspecified hyperlipidemia type
Gross hematuria
Gross hematuria
Essential hypertension
Essential hypertension
Gross hematuria
Hyperlipidemia, unspecified hyperlipidemia type
Gross hematuria

## 2019-05-24 NOTE — PROGRESS NOTE ADULT - PROBLEM SELECTOR PROBLEM 6
Gastroesophageal reflux disease, esophagitis presence not specified
Gastroesophageal reflux disease, esophagitis presence not specified
Benign prostatic hyperplasia, unspecified whether lower urinary tract symptoms present
Benign prostatic hyperplasia, unspecified whether lower urinary tract symptoms present
Gastroesophageal reflux disease, esophagitis presence not specified
Gastroesophageal reflux disease, esophagitis presence not specified

## 2019-05-24 NOTE — PROGRESS NOTE ADULT - PROBLEM SELECTOR PLAN 6
continue protonix
continue protonix
continue finasteride
continue finasteride
continue protonix
continue protonix

## 2019-05-24 NOTE — PROGRESS NOTE ADULT - ASSESSMENT
67 yo male s/p CABG, known h/o BPH, hematuria resolved- may have been from traumatic guerrero insertion  - continue proscar  - pt may f/u with his urologist as OP after discharge  - no urologic intervention required at this time

## 2019-05-24 NOTE — PROGRESS NOTE ADULT - PROVIDER SPECIALTY LIST ADULT
Anesthesia
CT Surgery
Cardiology
Intervent Cardiology
Urology
Urology
Cardiology
CT Surgery
CT Surgery

## 2019-05-24 NOTE — PROGRESS NOTE ADULT - PROBLEM SELECTOR PLAN 7
continue finasteride
continue post-op GI, DVT, abx prophylaxis
continue post-op GI, DVT, abx prophylaxis
continue finasteride

## 2019-05-24 NOTE — PROGRESS NOTE ADULT - PROBLEM SELECTOR PROBLEM 4
Essential hypertension
Hyperlipidemia, unspecified hyperlipidemia type
Hyperlipidemia, unspecified hyperlipidemia type
Essential hypertension

## 2019-05-24 NOTE — PROGRESS NOTE ADULT - ASSESSMENT
68 year old male with PMH CAD, PCI, GERD, BPH, HTN, HLD p/w midsternal chest discomfort > cath revealed MVD.    5/18 s/p CABG x 3 (LIMA to LAD, SVG to OM, SVG to ramus) with Dr Estrada    Postoperative course notable for bradycardia immediately postop, now resolved. Patient also had some hematuria and dysuria. Patient has also developed some nausea and dizziness, worse since antibiotic started empirically for hematuria/dysuria > stopped. No hypotension. Isolated lipase elevation > will monitor

## 2019-05-24 NOTE — PROGRESS NOTE ADULT - PROBLEM/PLAN-9
Detail Level: Zone
Detail Level: Simple
Detail Level: Detailed
DISPLAY PLAN FREE TEXT

## 2019-05-24 NOTE — PROGRESS NOTE ADULT - PROBLEM SELECTOR PROBLEM 1
CAD (coronary artery disease)
Coronary artery disease involving native coronary artery of native heart, angina presence unspecified
Coronary artery disease involving native coronary artery of native heart, angina presence unspecified
Coronary artery disease involving native coronary artery of native heart with unstable angina pectoris
Coronary artery disease involving native coronary artery of native heart with unstable angina pectoris
Coronary artery disease involving native coronary artery of native heart, angina presence unspecified

## 2019-05-24 NOTE — PROGRESS NOTE ADULT - SUBJECTIVE AND OBJECTIVE BOX
Subjective: PT sitting up in       Tele:                          T(F): 98.5 (19 @ 05:49), Max: 98.5 (19 @ 05:49)  HR: 71 (19 @ 05:49) (71 - 79)  BP: 147/85 (19 @ 05:49) (106/70 - 147/85)  RR: 18 (19 @ 05:49) (17 - 18)  SpO2: 95% (19 @ 05:49) (95% - 97%)  Wt(kg): --          Daily     Daily Weight in k.5 (24 May 2019 04:30)    LV EF:    No Known Allergies  OHS (Unknown)          139  |  103  |  18.0  ----------------------------<  103  5.1   |  26.0  |  0.92    Ca    9.3      23 May 2019 05:54  Phos  3.5       Mg     2.0         TPro  6.6  /  Alb  3.5  /  TBili  0.5  /  DBili  x   /  AST  19  /  ALT  23  /  AlkPhos  49                                 13.7   9.6   )-----------( 192      ( 23 May 2019 05:54 )             41.3               CAPILLARY BLOOD GLUCOSE               CXR:    I&O's Detail    23 May 2019 07:01  -  24 May 2019 07:00  --------------------------------------------------------  IN:    Oral Fluid: 580 mL  Total IN: 580 mL    OUT:    Voided: 1975 mL  Total OUT: 1975 mL    Total NET: -1395 mL          CHEST TUBE:  [ ] YES [ ] NO  OUTPUT:     per 24 hours    AIR LEAKS:  [ ] YES [ ] NO      BERNIE DRAIN:   [ ] YES [ ] NO  OUTPUT:     per 24 hours    EPICARDIAL WIRES:  [ ] YES [ ] NO      BOWEL MOVEMENT:  [ ] YES [ ] NO        Active Medications:  acetaminophen   Tablet .. 650 milliGRAM(s) Oral every 6 hours PRN  aspirin enteric coated 325 milliGRAM(s) Oral daily  atorvastatin 40 milliGRAM(s) Oral at bedtime  clopidogrel Tablet 75 milliGRAM(s) Oral daily  docusate sodium 100 milliGRAM(s) Oral three times a day  enoxaparin Injectable 40 milliGRAM(s) SubCutaneous daily  finasteride 5 milliGRAM(s) Oral daily  furosemide    Tablet 40 milliGRAM(s) Oral daily  metoprolol tartrate 25 milliGRAM(s) Oral two times a day  modafinil 200 milliGRAM(s) Oral daily  pantoprazole    Tablet 40 milliGRAM(s) Oral before breakfast  polyethylene glycol 3350 17 Gram(s) Oral at bedtime  potassium chloride    Tablet ER 20 milliEquivalent(s) Oral daily  senna 2 Tablet(s) Oral at bedtime  sertraline 25 milliGRAM(s) Oral daily  simethicone 80 milliGRAM(s) Chew three times a day PRN  sodium chloride 0.9% lock flush 3 milliLiter(s) IV Push every 8 hours      Physical Exam:    Neuro:     Pulm:     CV:     Abd:     Extremities:    Incision(s):                           PAST MEDICAL & SURGICAL HISTORY:  CAD S/P percutaneous coronary angioplasty  GERD (gastroesophageal reflux disease)  SOB (shortness of breath)  Chest pain  BPH (benign prostatic hyperplasia)  Hyperlipidemia  Hypertension  H/O hernia repair Subjective: PT sitting up in chair. Wife at bedside.  Denies SOB or CP.        Tele:                          T(F): 98.5 (19 @ 05:49), Max: 98.5 (19 @ 05:49)  HR: 71 (19 @ 05:49) (71 - 79)  BP: 147/85 (19 @ 05:49) (106/70 - 147/85)  RR: 18 (19 @ 05:49) (17 - 18)  SpO2: 95% (19 @ 05:49) (95% - 97%)  Wt(kg): --          Daily     Daily Weight in k.5 (24 May 2019 04:30)    LV EF:    No Known Allergies  OHS (Unknown)          139  |  103  |  18.0  ----------------------------<  103  5.1   |  26.0  |  0.92    Ca    9.3      23 May 2019 05:54  Phos  3.5       Mg     2.0         TPro  6.6  /  Alb  3.5  /  TBili  0.5  /  DBili  x   /  AST  19  /  ALT  23  /  AlkPhos  49                                 13.7   9.6   )-----------( 192      ( 23 May 2019 05:54 )             41.3               CAPILLARY BLOOD GLUCOSE               CXR:    I&O's Detail    23 May 2019 07:01  -  24 May 2019 07:00  --------------------------------------------------------  IN:    Oral Fluid: 580 mL  Total IN: 580 mL    OUT:    Voided: 1975 mL  Total OUT: 1975 mL    Total NET: -1395 mL          CHEST TUBE:  [ ] YES [ ] NO  OUTPUT:     per 24 hours    AIR LEAKS:  [ ] YES [ ] NO      BERNIE DRAIN:   [ ] YES [ ] NO  OUTPUT:     per 24 hours    EPICARDIAL WIRES:  [ ] YES [ ] NO      BOWEL MOVEMENT:  [ ] YES [ ] NO        Active Medications:  acetaminophen   Tablet .. 650 milliGRAM(s) Oral every 6 hours PRN  aspirin enteric coated 325 milliGRAM(s) Oral daily  atorvastatin 40 milliGRAM(s) Oral at bedtime  clopidogrel Tablet 75 milliGRAM(s) Oral daily  docusate sodium 100 milliGRAM(s) Oral three times a day  enoxaparin Injectable 40 milliGRAM(s) SubCutaneous daily  finasteride 5 milliGRAM(s) Oral daily  furosemide    Tablet 40 milliGRAM(s) Oral daily  metoprolol tartrate 25 milliGRAM(s) Oral two times a day  modafinil 200 milliGRAM(s) Oral daily  pantoprazole    Tablet 40 milliGRAM(s) Oral before breakfast  polyethylene glycol 3350 17 Gram(s) Oral at bedtime  potassium chloride    Tablet ER 20 milliEquivalent(s) Oral daily  senna 2 Tablet(s) Oral at bedtime  sertraline 25 milliGRAM(s) Oral daily  simethicone 80 milliGRAM(s) Chew three times a day PRN  sodium chloride 0.9% lock flush 3 milliLiter(s) IV Push every 8 hours      Physical Exam:    Neuro:     Pulm:     CV:     Abd:     Extremities:    Incision(s):                           PAST MEDICAL & SURGICAL HISTORY:  CAD S/P percutaneous coronary angioplasty  GERD (gastroesophageal reflux disease)  SOB (shortness of breath)  Chest pain  BPH (benign prostatic hyperplasia)  Hyperlipidemia  Hypertension  H/O hernia repair Subjective: PT sitting up in chair. Wife at bedside.  Denies SOB or CP.  NAD noted.      Tele: SR                         T(F): 98.5 (19 @ 05:49), Max: 98.5 (19 @ 05:49)  HR: 71 (19 @ 05:49) (71 - 79)  BP: 147/85 (19 @ 05:49) (106/70 - 147/85)  RR: 18 (19 @ 05:49) (17 - 18)  SpO2: 95% (19 @ 05:49) (95% - 97%) on room air at rest.    Daily     Daily Weight in k.5 (24 May 2019 04:30)    LV EF: 60%    No Known Allergies        139  |  103  |  18.0  ----------------------------<  103  5.1   |  26.0  |  0.92    Ca    9.3      23 May 2019 05:54  Phos  3.5       Mg     2.0         TPro  6.6  /  Alb  3.5  /  TBili  0.5  /  DBili  x   /  AST  19  /  ALT  23  /  AlkPhos  49                                 13.7   9.6   )-----------( 192      ( 23 May 2019 05:54 )             41.3          CXR:      I&O's Detail    23 May 2019 07:01  -  24 May 2019 07:00  --------------------------------------------------------  IN:    Oral Fluid: 580 mL  Total IN: 580 mL    OUT:    Voided: 1975 mL  Total OUT: 1975 mL    Total NET: -1395 mL          CHEST TUBE:  [ ] YES [ ] NO  OUTPUT:     per 24 hours    AIR LEAKS:  [ ] YES [ ] NO      BERNIE DRAIN:   [ ] YES [ ] NO  OUTPUT:     per 24 hours    EPICARDIAL WIRES:  [ ] YES [ ] NO      BOWEL MOVEMENT:  [ ] YES [ ] NO        Active Medications:  acetaminophen   Tablet .. 650 milliGRAM(s) Oral every 6 hours PRN  aspirin enteric coated 325 milliGRAM(s) Oral daily  atorvastatin 40 milliGRAM(s) Oral at bedtime  clopidogrel Tablet 75 milliGRAM(s) Oral daily  docusate sodium 100 milliGRAM(s) Oral three times a day  enoxaparin Injectable 40 milliGRAM(s) SubCutaneous daily  finasteride 5 milliGRAM(s) Oral daily  furosemide    Tablet 40 milliGRAM(s) Oral daily  metoprolol tartrate 25 milliGRAM(s) Oral two times a day  modafinil 200 milliGRAM(s) Oral daily  pantoprazole    Tablet 40 milliGRAM(s) Oral before breakfast  polyethylene glycol 3350 17 Gram(s) Oral at bedtime  potassium chloride    Tablet ER 20 milliEquivalent(s) Oral daily  senna 2 Tablet(s) Oral at bedtime  sertraline 25 milliGRAM(s) Oral daily  simethicone 80 milliGRAM(s) Chew three times a day PRN  sodium chloride 0.9% lock flush 3 milliLiter(s) IV Push every 8 hours      Physical Exam:    Neuro:     Pulm:     CV:     Abd:     Extremities:    Incision(s):                           PAST MEDICAL & SURGICAL HISTORY:  CAD S/P percutaneous coronary angioplasty  GERD (gastroesophageal reflux disease)  SOB (shortness of breath)  Chest pain  BPH (benign prostatic hyperplasia)  Hyperlipidemia  Hypertension  H/O hernia repair Subjective: PT sitting up in chair. Wife at bedside.  Denies SOB or CP.  NAD noted.      Tele: SR                         T(F): 98.5 (19 @ 05:49), Max: 98.5 (19 @ 05:49)  HR: 71 (19 @ 05:49) (71 - 79)  BP: 147/85 (19 @ 05:49) (106/70 - 147/85)  RR: 18 (19 @ 05:49) (17 - 18)  SpO2: 95% (19 @ 05:49) (95% - 97%) on room air at rest.    Daily     Daily Weight in k.5 (24 May 2019 04:30)    LV EF: 60%    No Known Allergies        139  |  103  |  18.0  ----------------------------<  103  5.1   |  26.0  |  0.92    Ca    9.3      23 May 2019 05:54  Phos  3.5       Mg     2.0         TPro  6.6  /  Alb  3.5  /  TBili  0.5  /  DBili  x   /  AST  19  /  ALT  23  /  AlkPhos  49                                 13.7   9.6   )-----------( 192      ( 23 May 2019 05:54 )             41.3          CXR:      I&O's Detail    23 May 2019 07:01  -  24 May 2019 07:00  --------------------------------------------------------  IN:    Oral Fluid: 580 mL  Total IN: 580 mL    OUT:    Voided: 1975 mL  Total OUT: 1975 mL    Total NET: -1395 mL          CHEST TUBE:  [ ] YES [x ] NO  OUTPUT:     per 24 hours    AIR LEAKS:  [ ] YES [ ] NO      BERNIE DRAIN:   [ ] YES [x ] NO  OUTPUT:     per 24 hours    EPICARDIAL WIRES:  [ ] YES [x ] NO      BOWEL MOVEMENT:  [x ] YES [ ] NO        Active Medications:  acetaminophen   Tablet .. 650 milliGRAM(s) Oral every 6 hours PRN  aspirin enteric coated 325 milliGRAM(s) Oral daily  atorvastatin 40 milliGRAM(s) Oral at bedtime  clopidogrel Tablet 75 milliGRAM(s) Oral daily  docusate sodium 100 milliGRAM(s) Oral three times a day  enoxaparin Injectable 40 milliGRAM(s) SubCutaneous daily  finasteride 5 milliGRAM(s) Oral daily  furosemide    Tablet 40 milliGRAM(s) Oral daily  metoprolol tartrate 25 milliGRAM(s) Oral two times a day  modafinil 200 milliGRAM(s) Oral daily  pantoprazole    Tablet 40 milliGRAM(s) Oral before breakfast  polyethylene glycol 3350 17 Gram(s) Oral at bedtime  potassium chloride    Tablet ER 20 milliEquivalent(s) Oral daily  senna 2 Tablet(s) Oral at bedtime  sertraline 25 milliGRAM(s) Oral daily  simethicone 80 milliGRAM(s) Chew three times a day PRN  sodium chloride 0.9% lock flush 3 milliLiter(s) IV Push every 8 hours      Physical Exam:    Neuro: AAOX3.  non focal.    Pulm: Diminished BLL.    CV: RRR.  +S1+S2.    Abd: Soft/NT/ND. +BS.  +BM  per pt.    Extremities: +1 edema BLE R>L.    Incision(s): MSI MORGAN and healing well.  Sternum stable.  CT sites with steri strips.                          PAST MEDICAL & SURGICAL HISTORY:  CAD S/P percutaneous coronary angioplasty  GERD (gastroesophageal reflux disease)  SOB (shortness of breath)  Chest pain  BPH (benign prostatic hyperplasia)  Hyperlipidemia  Hypertension  H/O hernia repair

## 2019-05-24 NOTE — PROGRESS NOTE ADULT - PROBLEM SELECTOR PROBLEM 2
Gastroesophageal reflux disease, esophagitis presence not specified
REJI on CPAP
REJI on CPAP
S/P coronary angiogram
Gastroesophageal reflux disease, esophagitis presence not specified
REJI on CPAP

## 2019-05-24 NOTE — PROGRESS NOTE ADULT - PROBLEM SELECTOR PLAN 8
C/O nausea > states that his stomach isn't used to "so many pills".  Improved today.  Pt feels that it's from the Lipitor and would like to go home on his home dose vytorin instead.

## 2019-05-24 NOTE — PROGRESS NOTE ADULT - PROBLEM SELECTOR PLAN 3
UA negative.  Urine culture pending.  Hematuria improved.  urology consult and followup appreciated.  Renal ultrasound complete: no hydronephrosis  Stopping empiric abx yesterday.

## 2019-05-24 NOTE — PROGRESS NOTE ADULT - PROBLEM SELECTOR PROBLEM 5
REJI on CPAP
Hyperlipidemia, unspecified hyperlipidemia type
Hyperlipidemia, unspecified hyperlipidemia type
Gastroesophageal reflux disease, esophagitis presence not specified
Gastroesophageal reflux disease, esophagitis presence not specified
Hyperlipidemia, unspecified hyperlipidemia type
REJI on CPAP
Hyperlipidemia, unspecified hyperlipidemia type

## 2019-05-24 NOTE — DISCHARGE NOTE NURSING/CASE MANAGEMENT/SOCIAL WORK - NSDCDPATPORTLINK_GEN_ALL_CORE
You can access the [facility] Patient Portal, offered by [facility], by registering with the following website: [email or web address]

## 2019-05-24 NOTE — PROGRESS NOTE ADULT - PROBLEM SELECTOR PLAN 1
Multivessel CAD - CABG today with Sean POWER  Hold plavix, continue heparin infusion.   ABX on call to OR  Diet and lifestyle modifications reinforced  NPO since midnight
S/P CABG X 3  neurologically intact  hemodynamically stable, tolerating lopressor 25 BID  Aspirin and plavix for acute graft closure prophylaxis  Lipitor for chronic graft closure prophylaxis   coughing and deep breathing exercises/incentive spirometry instructed and encouraged  Tolerating PO diet   PT follow up   acetaminophen and dilaudid PRN for analgesia  D/W Dr Estrada in AM rounds
S/P CABG X 3  neurologically intact  hemodynamically stable, tolerating lopressor 25 BID  Continue ASA, Plavix, and Lipitor.   Encourage CDBE/IS and increased mobilization.  Tolerating PO diet
Continue heparin gtt  Continue zocor  OR tomorrow
S/P CABG X 3  neurologically intact  hemodynamically stable, tolerating lopressor 25 BID  Continue ASA, Plavix, and Lipitor.   Encourage CDBE/IS and increased mobilization.  Tolerating PO diet
neurologically intact  hemodynamically stable, start lopressor this am  Aspirin and plavix for acute graft closure prophylaxis  Lipitor for chronic graft patency prophylaxis   coughing and deep breathing exercises/incentive spirometry instructed and encouraged  Tolerating PO diet   PT follow up   acetaminophen and dilaudid PRN for analgesia  continue with supportive ICU care as needed  tolerating PO lopressor 25 q 12 , hr stable   above plan of care to be discussed with CT surgical team on am rounds
neurologically intact  hemodynamically stable, start lopressor this am  Aspirin and plavix for acute graft closure prophylaxis  Lipitor for chronic graft patency prophylaxis  extubated yesterday, oxygenating well, coughing and deep breathing exercises/incentive spirometry instructed and encouraged  dysphagia screen passed, clear liquid diet and advance as tolerated  no diuresis at this time, replete electrolytes PRN, guerrero catheter to remain in place POD #1 for urine output monitoring in critically ill patient  insulin infusion with FS Q1H (titrated per CTICU protocol) for glycemic control  OOB to chair and PT consult this morning  acetaminophen and dilaudid PRN for analgesia  continue with supportive ICU care as needed  above plan of care to be discussed with CT surgical team on am rounds
S/P CABG X 3  Continue lopressor.  Continue ASA, Plavix, and Lipitor.   Encourage CDBE/IS and increased mobilization.

## 2019-05-26 ENCOUNTER — MOBILE ON CALL (OUTPATIENT)
Age: 69
End: 2019-05-26

## 2019-05-28 ENCOUNTER — APPOINTMENT (OUTPATIENT)
Age: 69
End: 2019-05-28
Payer: COMMERCIAL

## 2019-05-28 VITALS
RESPIRATION RATE: 18 BRPM | SYSTOLIC BLOOD PRESSURE: 138 MMHG | HEART RATE: 76 BPM | WEIGHT: 194 LBS | BODY MASS INDEX: 31.31 KG/M2 | DIASTOLIC BLOOD PRESSURE: 76 MMHG | OXYGEN SATURATION: 98 %

## 2019-05-28 PROCEDURE — 99024 POSTOP FOLLOW-UP VISIT: CPT

## 2019-05-28 RX ORDER — TAMSULOSIN HYDROCHLORIDE 0.4 MG/1
0.4 CAPSULE ORAL
Qty: 30 | Refills: 5 | Status: DISCONTINUED | COMMUNITY
End: 2019-05-28

## 2019-05-28 RX ORDER — NITROGLYCERIN 0.4 MG/1
0.4 TABLET SUBLINGUAL
Qty: 30 | Refills: 3 | Status: DISCONTINUED | COMMUNITY
Start: 2019-05-01 | End: 2019-05-28

## 2019-05-28 RX ORDER — AMLODIPINE BESYLATE AND BENAZEPRIL HYDROCHLORIDE 5; 10 MG/1; MG/1
5-10 CAPSULE ORAL
Refills: 0 | Status: DISCONTINUED | COMMUNITY
End: 2019-05-28

## 2019-05-28 RX ORDER — ATENOLOL 25 MG/1
25 TABLET ORAL DAILY
Qty: 90 | Refills: 3 | Status: DISCONTINUED | COMMUNITY
End: 2019-05-28

## 2019-05-28 NOTE — ASSESSMENT
[FreeTextEntry1] : This is a 67 yo male S/P CABG by Dr. Estrada at [facility] and doing well.\par Post Operative Care:\par Pt advised of importance of daily weights. Pt instructed on how to use incentive spirometer every hour, demonstrated proper use. Also advised to cleanse incisions daily with mild soap and water and to avoid lotions, powders, ointments or creams near or on the incision. Low salt, low fat diet encouraged and discussed. Pt advised to avoid heavy lifting or straining. \par \par Follow Your Heart team will continue to follow up with pt status. \par NP/CCC roles explained with pt understanding, contact information provided. Pt agrees to call with any questions, issues or concerns.  Worsening symptoms reviewed with patient understanding.  \par \par FOLLOW UP APPOINTMENTS:\par CTS:Dr. Estrada 6/5\par :\par

## 2019-05-28 NOTE — HISTORY OF PRESENT ILLNESS
[FreeTextEntry1] : This is a 67 yo male s/p CABG with Dr. Estrada on 5/18.  Pt doing well and offered complaints what he thought was a "rash" on leg and he has reported it has improved.  Denies fever, sob, hematuria, dizziness, pain.

## 2019-05-28 NOTE — PHYSICAL EXAM
[Auscultation Breath Sounds / Voice Sounds] : lungs were clear to auscultation bilaterally [Examination Of The Chest] : the chest was normal in appearance [Chest Visual Inspection Thoracic Asymmetry] : no chest asymmetry [Diminished Respiratory Excursion] : normal chest expansion [Bowel Sounds] : normal bowel sounds [Abdomen Soft] : soft [Abdomen Tenderness] : non-tender [Abdomen Mass (___ Cm)] : no abdominal mass palpated [Skin Color & Pigmentation] : normal skin color and pigmentation [Skin Turgor] : normal skin turgor [] : no rash [FreeTextEntry1] : no rash noted. Bilateral SVG sites D&I with slight redness and ecchymosis [Deep Tendon Reflexes (DTR)] : deep tendon reflexes were 2+ and symmetric [Sensation] : the sensory exam was normal to light touch and pinprick [No Focal Deficits] : no focal deficits [Impaired Insight] : insight and judgment were intact [Affect] : the affect was normal [Oriented To Time, Place, And Person] : oriented to person, place, and time

## 2019-05-29 ENCOUNTER — MEDICATION RENEWAL (OUTPATIENT)
Age: 69
End: 2019-05-29

## 2019-05-29 ENCOUNTER — RX RENEWAL (OUTPATIENT)
Age: 69
End: 2019-05-29

## 2019-06-05 ENCOUNTER — APPOINTMENT (OUTPATIENT)
Dept: CARDIOTHORACIC SURGERY | Facility: CLINIC | Age: 69
End: 2019-06-05
Payer: COMMERCIAL

## 2019-06-05 VITALS
DIASTOLIC BLOOD PRESSURE: 78 MMHG | RESPIRATION RATE: 16 BRPM | BODY MASS INDEX: 30.7 KG/M2 | OXYGEN SATURATION: 98 % | WEIGHT: 191 LBS | HEART RATE: 65 BPM | SYSTOLIC BLOOD PRESSURE: 149 MMHG | HEIGHT: 66 IN

## 2019-06-05 PROCEDURE — 99024 POSTOP FOLLOW-UP VISIT: CPT

## 2019-06-05 RX ORDER — POTASSIUM CHLORIDE 10 MEQ
CAPSULE, EXTENDED RELEASE ORAL DAILY
Refills: 0 | Status: DISCONTINUED | COMMUNITY
End: 2019-06-05

## 2019-06-05 RX ORDER — FUROSEMIDE 40 MG/1
40 TABLET ORAL DAILY
Refills: 0 | Status: DISCONTINUED | COMMUNITY
End: 2019-06-05

## 2019-06-05 NOTE — PHYSICAL EXAM
[] : no respiratory distress [Respiration, Rhythm And Depth] : normal respiratory rhythm and effort [Apical Impulse] : the apical impulse was normal [Heart Rate And Rhythm] : heart rate was normal and rhythm regular [Clean] : clean [Dry] : dry [Healing Well] : healing well [___ +] : right ankle [unfilled]U+ pitting edema

## 2019-06-05 NOTE — COUNSELING
[Hygeine (Including Daily Shower)] : hygeine (including daily shower) [Importance of Regular Medical Follow-Up] : the importance of regular medical follow-up [No Heavy Lifting] : no heavy lifting (>15-20 lb. for 1 month or 25 lb. for 3 months from date of surgery) [Blood Pressure Control] : blood pressure control [S/S of infection] : signs and symptoms of infection (and to whom it should be reported) [Progressive Ambulation/Activity] : progressive ambulation/activity [Medication/Vitamin/Herb/Food Interaction] : medication/vitamin/herb/food interaction [Stress Management] : stress management

## 2019-06-09 NOTE — CONSULT LETTER
[Dear  ___] : Dear  [unfilled], [Courtesy Letter:] : I had the pleasure of seeing your patient, [unfilled], in my office today. [Please see my note below.] : Please see my note below. [Consult Closing:] : Thank you very much for allowing me to participate in the care of this patient.  If you have any questions, please do not hesitate to contact me. [Sincerely,] : Sincerely, [FreeTextEntry3] : Dr. Markus Estraad \par Chief, Cardiovascular Surgery at [facility]\par System Director of Surgical Heart Failure\par Professor\par Cardiovascular and Thoracic Surgery\par [employer]\par [facility]\par [address] [place] [address]\par [phone]\par  [FreeTextEntry2] : Dr. Ralph Osorio

## 2019-06-09 NOTE — REASON FOR VISIT
[Spouse] : spouse [de-identified] : CABG x 3 LIMA [de-identified] : Today on exam patient's lungs clear bilaterally, normal sinus rhythm, sternum stable, incision clean, dry and intact. SVG site is clean, dry and intact.  No peripheral edema noted. Instructed patient on importance of optimal glycemic control, daily showering, daily weights, incentive spirometer use, and increase ambulation as tolerated. Instructed to call office with any signs or symptoms of infection or weight gain of 2 or more pounds in 1 day or 3 or more pounds in 1 week.  \par  \par Plan:\par 1) Continue current medication regimen\par 2) Follow up with cardiologist\par 3) May return on as needed basis \par \par  \par \par  [de-identified] : 05/18/19

## 2019-06-09 NOTE — ASSESSMENT
[FreeTextEntry1] : Very pleasant patient following coronary artery bypass grafting x 3 on 5/2018.  The patient is currently at home doing very well. Patient reports significant improvement over the last few weeks. The patient is becoming more active. Patient reports feeling fatigued and mild shortness of breath, and some incisional discomfort, but reports that this is getting better over the last few days. On exam all the vital signs are stable. Lungs are clear. The heart sounds are normal. All the incisions are healing nicely.   The incisions are stable, without any evidence of infection. I have advised the patient to continue with physical activity, and to follow up with you for the rest of the cardiovascular care. Thank you for the opportunity to participate in the care of your patient. Please do not hesitate to call me if I can be of further assistance.

## 2019-06-13 ENCOUNTER — APPOINTMENT (OUTPATIENT)
Dept: CARDIOLOGY | Facility: CLINIC | Age: 69
End: 2019-06-13
Payer: COMMERCIAL

## 2019-06-13 ENCOUNTER — NON-APPOINTMENT (OUTPATIENT)
Age: 69
End: 2019-06-13

## 2019-06-13 VITALS
DIASTOLIC BLOOD PRESSURE: 75 MMHG | HEART RATE: 63 BPM | OXYGEN SATURATION: 98 % | BODY MASS INDEX: 30.86 KG/M2 | HEIGHT: 66 IN | WEIGHT: 192 LBS | SYSTOLIC BLOOD PRESSURE: 130 MMHG | RESPIRATION RATE: 16 BRPM

## 2019-06-13 PROCEDURE — 99214 OFFICE O/P EST MOD 30 MIN: CPT

## 2019-06-13 PROCEDURE — 93000 ELECTROCARDIOGRAM COMPLETE: CPT

## 2019-06-13 RX ORDER — DOCUSATE SODIUM 100 MG/1
100 CAPSULE ORAL 3 TIMES DAILY
Refills: 0 | Status: DISCONTINUED | COMMUNITY
End: 2019-06-13

## 2019-06-13 RX ORDER — OXYCODONE HYDROCHLORIDE AND ACETAMINOPHEN 5; 325 MG/1; MG/1
5-325 TABLET ORAL
Refills: 0 | Status: DISCONTINUED | COMMUNITY
End: 2019-06-13

## 2019-06-13 RX ORDER — METOPROLOL TARTRATE 25 MG/1
25 TABLET, FILM COATED ORAL TWICE DAILY
Refills: 0 | Status: DISCONTINUED | COMMUNITY
End: 2019-06-13

## 2019-06-13 RX ORDER — MODAFINIL 200 MG/1
200 TABLET ORAL DAILY
Refills: 0 | Status: ACTIVE | COMMUNITY

## 2019-06-13 RX ORDER — MONTELUKAST SODIUM 10 MG/1
10 TABLET, FILM COATED ORAL
Refills: 0 | Status: DISCONTINUED | COMMUNITY
End: 2019-06-13

## 2019-06-13 RX ORDER — NITROGLYCERIN 0.4 MG/1
0.4 TABLET SUBLINGUAL
Qty: 75 | Refills: 0 | Status: DISCONTINUED | COMMUNITY
Start: 1900-01-01 | End: 2019-06-13

## 2019-06-13 RX ORDER — SERTRALINE HYDROCHLORIDE 25 MG/1
TABLET, FILM COATED ORAL DAILY
Refills: 0 | Status: DISCONTINUED | COMMUNITY
End: 2019-06-13

## 2019-06-13 NOTE — REASON FOR VISIT
[FreeTextEntry1] : This is a 69 y/o male patient presenting for continuation of cardiac care s/p CABG x3  performed by  of [facility] which stemmed from initial concerns of mid sternal chest discomfort which had been intermittent since January. \par \par The patient has no preexisting history of cardiovascular disease.  He does have risk factors which include:\par 1.  Hypertension.\par 2.  Hyperlipidemia.\par 3.  A very strong family history (father with CABG and brother stents).\par \par Now\par 4. CABG x 3   5/18/19\par \par LIMA to LAD\par SVG to OM1\par SVG to PDA\par \par \par

## 2019-06-13 NOTE — HISTORY OF PRESENT ILLNESS
[FreeTextEntry1] : \par His past history of chest discomfort had no radiation noted and lasted 15-20 minutes. At that time it was unsure if they are anxiety related.\par \par 5/13/19  Caty  MIBI:\par  showed inferior wall infarct and ischemia \par \par On 5/15/2019 he underwent cardiac Catherization and was found to have significant 3VD. which he was referred for surgical management. \par \par On  5/18/2019 under went a CABG X3 ( LIMA TO LAD, SVG TO OM, SVG TO RAMUS).  \par \par He had complications post operatively with hematuria and bradycardia which have now since resolved.\par \par Mild B/L L/E edema now off Furosemide x 2 weeks\par he denies SOB\par \par \par \par He does complain of an intermittent dull ache noted to his left upper chest which started on Tuesday. He has taken Tylenol which does resolve pain. States movement such as brushing his teeth  exacerbates his discomfort.. \par \par \par

## 2019-06-13 NOTE — ASSESSMENT
[FreeTextEntry1] : ECG- SR at 63 BPM right ventricular conduction delay, Repolarization vs. ischemia. T wave inversion inferior and laterally.\par New T wave inversion as shown on ECG which was not present on post op ECG.\par \par Hospital cardiac studies\par \par Carotid Doppler 5/15/2019\par Minimal B/L plaque\par No significant narrowing in the visualized common carotid and internal carotid arteries\par \par Renal US 5/23/2019\par No hydronephrosis\par \par Discharge laboratory data 5/23/19\par BUN 18\par CREAT 0.92\par HGB 13.7\par \par Laboratory data from 4/5/19 shows total cholesterol of 156, HDL 47, LDL 73, triglycerides of 81. \par \par Impression:\par The patient is a 68-year-old with risk factors including:\par 1.  A strong family history.\par 2.  Hypertension. \par 3.  Hyperlipidemia. \par \par With recent CABG x 3 performed on 5/18/2019 with post op complications of hematuria and bradycardia, both which have now resolved.\par \par He has been extensively educated in all risk factors that may have contributed to his coronary issues and on preventative measures  which he is to take to ensure  optimal care  of his health moving forward.\par \par He has noted edema to his L/E which may be resolved with elevation and low sodium diet, otherwise a diuretic may need to be prescribed, this will be discussed during our next follow up\par \par His episodes of chest pain may be post op related and due to his healing process. He is to continue to take Tylenol for his discomfort. New T wave inversions are noted on the ECG and the CABG Op Note does indicate that there was some difficulty finding good veins for the bypass graft to the OM and the PDA\par \par He has been instructed that he can not drive until he is 6 weeks post op.\par \par  \par Plan\par 1. Continue  Plavix 75 mg a day, his metoprolol tartrate has been changed to Succinate 25 mg , otherwise continuation of all medications.\par 2.  Cannot use aspirin because of his history of GERD and very sensitive stomach.  \par 3.  Monitor vein harvest sites for signs of infection.\par 4. Complete nuclear stress test in July to acquire a new baseline of coronary perfusion and pre cardiac rehab assessment of what his capacity is and evaluate the post op left precordial discomfort with new T wave changes\par 5. Pending results of nuclear stress test and would like to see him enrolled in cardiac rehab by late July.\par 6. Post op echocardiogram\par 7. Educated on proper sodium intake, not to exceed more then 2,000 mg daily. If his swelling does not improve a diuretic may need to be added. \par 8. Continued follow up with Dr. Estrada\par 9. Repeat blood work\par \par Office follow up in 2 months\par I have encouraged the patient to contact me if he has any change or cardiac symptoms develop.

## 2019-06-13 NOTE — PHYSICAL EXAM
[General Appearance - Well Developed] : well developed [Normal Appearance] : normal appearance [Normal Conjunctiva] : the conjunctiva exhibited no abnormalities [Normal Oral Mucosa] : normal oral mucosa [Heart Rate And Rhythm] : heart rate and rhythm were normal [] : no respiratory distress [Abnormal Walk] : normal gait [Bowel Sounds] : normal bowel sounds [Oriented To Time, Place, And Person] : oriented to person, place, and time [FreeTextEntry1] : Healing left veinous harvest graph site with no erythema  or drainage noted.\par \par Small collection noted to incision site on right calf with  fluctuance and induration. Measuring 3x2 CM

## 2019-06-25 ENCOUNTER — RX RENEWAL (OUTPATIENT)
Age: 69
End: 2019-06-25

## 2019-07-01 ENCOUNTER — RX RENEWAL (OUTPATIENT)
Age: 69
End: 2019-07-01

## 2019-07-01 ENCOUNTER — APPOINTMENT (OUTPATIENT)
Dept: CARDIOLOGY | Facility: CLINIC | Age: 69
End: 2019-07-01

## 2019-07-01 LAB
ALBUMIN SERPL ELPH-MCNC: 4.4 G/DL
ALP BLD-CCNC: 72 U/L
ALT SERPL-CCNC: 22 U/L
ANION GAP SERPL CALC-SCNC: 13 MMOL/L
AST SERPL-CCNC: 20 U/L
BASOPHILS # BLD AUTO: 0.04 K/UL
BASOPHILS NFR BLD AUTO: 0.6 %
BILIRUB SERPL-MCNC: 0.5 MG/DL
BUN SERPL-MCNC: 16 MG/DL
CALCIUM SERPL-MCNC: 9.8 MG/DL
CHLORIDE SERPL-SCNC: 104 MMOL/L
CHOLEST SERPL-MCNC: 122 MG/DL
CHOLEST/HDLC SERPL: 3.1 RATIO
CO2 SERPL-SCNC: 25 MMOL/L
CREAT SERPL-MCNC: 1.1 MG/DL
EOSINOPHIL # BLD AUTO: 0.11 K/UL
EOSINOPHIL NFR BLD AUTO: 1.7 %
GLUCOSE SERPL-MCNC: 103 MG/DL
HCT VFR BLD CALC: 45 %
HDLC SERPL-MCNC: 40 MG/DL
HGB BLD-MCNC: 13.9 G/DL
IMM GRANULOCYTES NFR BLD AUTO: 0.3 %
LDLC SERPL CALC-MCNC: 66 MG/DL
LYMPHOCYTES # BLD AUTO: 1.71 K/UL
LYMPHOCYTES NFR BLD AUTO: 26.2 %
MAN DIFF?: NORMAL
MCHC RBC-ENTMCNC: 27.6 PG
MCHC RBC-ENTMCNC: 30.9 GM/DL
MCV RBC AUTO: 89.3 FL
MONOCYTES # BLD AUTO: 0.56 K/UL
MONOCYTES NFR BLD AUTO: 8.6 %
NEUTROPHILS # BLD AUTO: 4.08 K/UL
NEUTROPHILS NFR BLD AUTO: 62.6 %
PLATELET # BLD AUTO: 198 K/UL
POTASSIUM SERPL-SCNC: 4.3 MMOL/L
PROT SERPL-MCNC: 7.3 G/DL
RBC # BLD: 5.04 M/UL
RBC # FLD: 13.3 %
SODIUM SERPL-SCNC: 142 MMOL/L
TRIGL SERPL-MCNC: 81 MG/DL
WBC # FLD AUTO: 6.52 K/UL

## 2019-07-01 RX ORDER — METOPROLOL SUCCINATE 25 MG/1
25 TABLET, EXTENDED RELEASE ORAL DAILY
Qty: 90 | Refills: 3 | Status: DISCONTINUED | COMMUNITY
Start: 2019-06-13 | End: 2019-07-01

## 2019-07-16 ENCOUNTER — APPOINTMENT (OUTPATIENT)
Dept: CARDIOLOGY | Facility: CLINIC | Age: 69
End: 2019-07-16
Payer: COMMERCIAL

## 2019-07-16 PROCEDURE — 93306 TTE W/DOPPLER COMPLETE: CPT

## 2019-07-19 ENCOUNTER — APPOINTMENT (OUTPATIENT)
Dept: CARDIOLOGY | Facility: CLINIC | Age: 69
End: 2019-07-19
Payer: COMMERCIAL

## 2019-07-19 PROCEDURE — 93015 CV STRESS TEST SUPVJ I&R: CPT

## 2019-07-19 PROCEDURE — 78452 HT MUSCLE IMAGE SPECT MULT: CPT

## 2019-07-19 PROCEDURE — A9500: CPT

## 2019-07-19 RX ADMIN — REGADENOSON 0 MG/5ML: 0.08 INJECTION, SOLUTION INTRAVENOUS at 00:00

## 2019-07-19 RX ADMIN — AMINOPHYLLINE 0 MG/ML: 25 INJECTION, SOLUTION INTRAVENOUS at 00:00

## 2019-07-22 RX ORDER — AMINOPHYLLINE 25 MG/ML
25 INJECTION, SOLUTION INTRAVENOUS
Qty: 0 | Refills: 0 | Status: COMPLETED | OUTPATIENT
Start: 2019-07-19

## 2019-07-22 RX ORDER — REGADENOSON 0.08 MG/ML
0.4 INJECTION, SOLUTION INTRAVENOUS
Qty: 4 | Refills: 0 | Status: COMPLETED | OUTPATIENT
Start: 2019-07-19

## 2019-07-26 RX ORDER — KIT FOR THE PREPARATION OF TECHNETIUM TC99M SESTAMIBI 1 MG/5ML
INJECTION, POWDER, LYOPHILIZED, FOR SOLUTION PARENTERAL
Refills: 0 | Status: COMPLETED | OUTPATIENT
Start: 2019-07-26

## 2019-07-26 RX ADMIN — KIT FOR THE PREPARATION OF TECHNETIUM TC99M SESTAMIBI 0: 1 INJECTION, POWDER, LYOPHILIZED, FOR SOLUTION PARENTERAL at 00:00

## 2019-07-30 ENCOUNTER — APPOINTMENT (OUTPATIENT)
Dept: CARDIOLOGY | Facility: CLINIC | Age: 69
End: 2019-07-30

## 2019-08-12 ENCOUNTER — APPOINTMENT (OUTPATIENT)
Dept: CARDIOLOGY | Facility: CLINIC | Age: 69
End: 2019-08-12
Payer: COMMERCIAL

## 2019-08-12 ENCOUNTER — NON-APPOINTMENT (OUTPATIENT)
Age: 69
End: 2019-08-12

## 2019-08-12 VITALS
HEIGHT: 66 IN | HEART RATE: 57 BPM | RESPIRATION RATE: 16 BRPM | OXYGEN SATURATION: 98 % | WEIGHT: 186 LBS | SYSTOLIC BLOOD PRESSURE: 110 MMHG | BODY MASS INDEX: 29.89 KG/M2 | DIASTOLIC BLOOD PRESSURE: 70 MMHG

## 2019-08-12 PROCEDURE — 93000 ELECTROCARDIOGRAM COMPLETE: CPT

## 2019-08-12 PROCEDURE — 99214 OFFICE O/P EST MOD 30 MIN: CPT

## 2019-08-12 NOTE — REASON FOR VISIT
[FreeTextEntry1] : This is a 68 y/o male patient presenting for continuation of cardiac care s/p CABG x3  performed by   which stemmed from initial concerns of mid sternal chest discomfort which had been intermittent since January. \par \par The patient has no preexisting history of cardiovascular disease.  He does have risk factors which include:\par 1.  Hypertension.\par 2.  Hyperlipidemia.\par 3.  A very strong family history (father with CABG and brother stents).\par \par Now\par 4. CABG x 3   5/18/19\par \par LIMA to LAD\par SVG to OM1\par SVG to PDA\par \par An echocardiogram was completed on 7/16/19 and stress test on 7/19/19 for possible cardiac rehab enrollment. These results will be reviewed.\par \par Has concerns of discomfort to his upper chest after carrying a humidifier up a flight of stairs and describes it as muscular, otherwise no SOB, edema, PND or orthopnea.\par \par Ccc. episodes of unsteady gait with out any near syncope/ syncope or dizziness. \par \par

## 2019-08-12 NOTE — PHYSICAL EXAM
[General Appearance - Well Developed] : well developed [Normal Appearance] : normal appearance [Normal Conjunctiva] : the conjunctiva exhibited no abnormalities [] : no respiratory distress [Normal Oral Mucosa] : normal oral mucosa [Bowel Sounds] : normal bowel sounds [Heart Rate And Rhythm] : heart rate and rhythm were normal [Abnormal Walk] : normal gait [Oriented To Time, Place, And Person] : oriented to person, place, and time [FreeTextEntry1] : Healing left veinous harvest graph site with no erythema  or drainage noted.\par \par Small collection noted to incision site on right calf with  fluctuance and induration. Measuring 3x2 CM

## 2019-08-12 NOTE — ASSESSMENT
[FreeTextEntry1] : Echocardiogram:\par Overall normal LV size and function.\par No significant new wall motion abnormalities.\par Mild valvular insufficiencies as stated.\par \par Regadenoson  stress test  7/19/19\par Exercised for 3 minutes and 5 seconds achieving 4 METS.\par Test converted to lexiscan\par Peak  BPM\par Peak /90\par Occ. PACs/PVCs\par SPECT imaging shows a fixed inferior wall infarct seen preoperatively.\par There is a small area of reversibility of the anterior wall that is possibly ischemia and/or soft tissue attenuation artifact.\par \par Carotid Doppler 5/15/2019\par Minimal B/L plaque\par No significant narrowing in the visualized common carotid and internal carotid arteries\par \par Renal US 5/23/2019\par No hydronephrosis\par \par Discharge laboratory data 5/23/19\par BUN 18\par CREAT 0.92\par HGB 13.7\par \par Laboratory data from 4/5/19 shows total cholesterol of 156, HDL 47, LDL 73, triglycerides of 81. \par \par Impression:\par The patient is a 69-year-old with risk factors including:\par 1.  A strong family history.\par 2.  Hypertension. \par 3.  Hyperlipidemia. \par \par With recent CABG x 3 performed on 5/18/2019 with post op complications of hematuria and bradycardia, both which have now resolved.\par \par -His episodes of chest pain likely musculoskeletal and post op related and due to his healing process. He is to continue to take Tylenol for his discomfort. New T wave inversions are noted on the ECG and the CABG Op Note does indicate that there was some difficulty finding good veins for the bypass graft to the OM and the PDA\par \par -Blood pressure today 110/70 and stable.\par - 6 lb noted weight loss\par \par  \par Plan\par 1. Continue  Plavix 75 mg a day, his metoprolol Succinate has been changed to Tartrate , otherwise continuation of all medications.\par 2.  Cannot use aspirin because of his history of GERD and very sensitive stomach.  \par 3.  Participate in cardiac rehab to help build cardiac tolerance which is clearly low level,  and overall strength. \par 4. Educated on proper sodium intake, not to exceed more then 2,000 mg daily. If his swelling does not improve a diuretic may need to be added. \par 5. Repeat blood work\par \par Clinical follow up in 3\par I have encouraged the patient to contact me if he has any change or cardiac symptoms develop.

## 2019-08-12 NOTE — HISTORY OF PRESENT ILLNESS
[FreeTextEntry1] : Patient has been reluctant to engage in many activities because of concerns about his limitations and what various nonspecific symptoms mean.\par \par 5/13/19  Caty  MIBI:\par IWMI with moderate arlyn-infarct ischemia\par \par On 5/15/2019 he underwent cardiac Catherization and was found to have significant 3VD. which he was referred for surgical management. \par \par On  5/18/2019 under went a CABG X3 ( LIMA TO LAD, SVG TO OM, SVG TO RAMUS).  \par \par He had complications post operatively with hematuria and bradycardia which have now since resolved.\par \par \par \par

## 2019-08-21 ENCOUNTER — APPOINTMENT (OUTPATIENT)
Dept: CARDIOLOGY | Facility: CLINIC | Age: 69
End: 2019-08-21
Payer: COMMERCIAL

## 2019-08-21 PROCEDURE — 93798 PHYS/QHP OP CAR RHAB W/ECG: CPT

## 2019-08-23 ENCOUNTER — APPOINTMENT (OUTPATIENT)
Dept: CARDIOLOGY | Facility: CLINIC | Age: 69
End: 2019-08-23
Payer: COMMERCIAL

## 2019-08-23 PROCEDURE — 93798 PHYS/QHP OP CAR RHAB W/ECG: CPT

## 2019-08-26 ENCOUNTER — APPOINTMENT (OUTPATIENT)
Dept: CARDIOLOGY | Facility: CLINIC | Age: 69
End: 2019-08-26

## 2019-08-26 ENCOUNTER — APPOINTMENT (OUTPATIENT)
Dept: CARDIOLOGY | Facility: CLINIC | Age: 69
End: 2019-08-26
Payer: COMMERCIAL

## 2019-08-26 PROCEDURE — 93798 PHYS/QHP OP CAR RHAB W/ECG: CPT

## 2019-08-28 ENCOUNTER — APPOINTMENT (OUTPATIENT)
Dept: CARDIOLOGY | Facility: CLINIC | Age: 69
End: 2019-08-28
Payer: COMMERCIAL

## 2019-08-28 ENCOUNTER — APPOINTMENT (OUTPATIENT)
Dept: CARDIOLOGY | Facility: CLINIC | Age: 69
End: 2019-08-28

## 2019-08-28 PROCEDURE — 93798 PHYS/QHP OP CAR RHAB W/ECG: CPT

## 2019-08-30 ENCOUNTER — APPOINTMENT (OUTPATIENT)
Dept: CARDIOLOGY | Facility: CLINIC | Age: 69
End: 2019-08-30

## 2019-08-30 ENCOUNTER — APPOINTMENT (OUTPATIENT)
Dept: CARDIOLOGY | Facility: CLINIC | Age: 69
End: 2019-08-30
Payer: COMMERCIAL

## 2019-08-30 ENCOUNTER — RX RENEWAL (OUTPATIENT)
Age: 69
End: 2019-08-30

## 2019-08-30 PROCEDURE — 93798 PHYS/QHP OP CAR RHAB W/ECG: CPT

## 2019-09-04 ENCOUNTER — APPOINTMENT (OUTPATIENT)
Dept: CARDIOLOGY | Facility: CLINIC | Age: 69
End: 2019-09-04

## 2019-09-06 ENCOUNTER — APPOINTMENT (OUTPATIENT)
Dept: CARDIOLOGY | Facility: CLINIC | Age: 69
End: 2019-09-06
Payer: COMMERCIAL

## 2019-09-06 ENCOUNTER — APPOINTMENT (OUTPATIENT)
Dept: CARDIOLOGY | Facility: CLINIC | Age: 69
End: 2019-09-06

## 2019-09-06 PROCEDURE — 93798 PHYS/QHP OP CAR RHAB W/ECG: CPT

## 2019-09-09 ENCOUNTER — APPOINTMENT (OUTPATIENT)
Dept: CARDIOLOGY | Facility: CLINIC | Age: 69
End: 2019-09-09

## 2019-09-09 ENCOUNTER — APPOINTMENT (OUTPATIENT)
Dept: CARDIOLOGY | Facility: CLINIC | Age: 69
End: 2019-09-09
Payer: COMMERCIAL

## 2019-09-09 PROCEDURE — 93798 PHYS/QHP OP CAR RHAB W/ECG: CPT

## 2019-09-11 ENCOUNTER — APPOINTMENT (OUTPATIENT)
Dept: CARDIOLOGY | Facility: CLINIC | Age: 69
End: 2019-09-11
Payer: COMMERCIAL

## 2019-09-11 ENCOUNTER — APPOINTMENT (OUTPATIENT)
Dept: CARDIOLOGY | Facility: CLINIC | Age: 69
End: 2019-09-11

## 2019-09-11 PROCEDURE — 93798 PHYS/QHP OP CAR RHAB W/ECG: CPT

## 2019-09-13 ENCOUNTER — APPOINTMENT (OUTPATIENT)
Dept: CARDIOLOGY | Facility: CLINIC | Age: 69
End: 2019-09-13

## 2019-09-13 ENCOUNTER — APPOINTMENT (OUTPATIENT)
Dept: CARDIOLOGY | Facility: CLINIC | Age: 69
End: 2019-09-13
Payer: COMMERCIAL

## 2019-09-13 PROCEDURE — 93798 PHYS/QHP OP CAR RHAB W/ECG: CPT

## 2019-09-16 ENCOUNTER — APPOINTMENT (OUTPATIENT)
Dept: CARDIOLOGY | Facility: CLINIC | Age: 69
End: 2019-09-16
Payer: COMMERCIAL

## 2019-09-16 ENCOUNTER — APPOINTMENT (OUTPATIENT)
Dept: CARDIOLOGY | Facility: CLINIC | Age: 69
End: 2019-09-16

## 2019-09-16 PROCEDURE — 93798 PHYS/QHP OP CAR RHAB W/ECG: CPT

## 2019-09-18 ENCOUNTER — APPOINTMENT (OUTPATIENT)
Dept: CARDIOLOGY | Facility: CLINIC | Age: 69
End: 2019-09-18

## 2019-09-18 ENCOUNTER — APPOINTMENT (OUTPATIENT)
Dept: CARDIOLOGY | Facility: CLINIC | Age: 69
End: 2019-09-18
Payer: COMMERCIAL

## 2019-09-18 PROCEDURE — 93798 PHYS/QHP OP CAR RHAB W/ECG: CPT

## 2019-09-20 ENCOUNTER — APPOINTMENT (OUTPATIENT)
Dept: CARDIOLOGY | Facility: CLINIC | Age: 69
End: 2019-09-20

## 2019-09-20 ENCOUNTER — APPOINTMENT (OUTPATIENT)
Dept: CARDIOLOGY | Facility: CLINIC | Age: 69
End: 2019-09-20
Payer: COMMERCIAL

## 2019-09-20 PROCEDURE — 93798 PHYS/QHP OP CAR RHAB W/ECG: CPT

## 2019-09-23 ENCOUNTER — APPOINTMENT (OUTPATIENT)
Dept: CARDIOLOGY | Facility: CLINIC | Age: 69
End: 2019-09-23
Payer: SELF-PAY

## 2019-09-23 ENCOUNTER — APPOINTMENT (OUTPATIENT)
Dept: CARDIOLOGY | Facility: CLINIC | Age: 69
End: 2019-09-23

## 2019-09-23 PROCEDURE — PHASE4: CUSTOM

## 2019-09-25 ENCOUNTER — APPOINTMENT (OUTPATIENT)
Dept: CARDIOLOGY | Facility: CLINIC | Age: 69
End: 2019-09-25
Payer: SELF-PAY

## 2019-09-25 ENCOUNTER — APPOINTMENT (OUTPATIENT)
Dept: CARDIOLOGY | Facility: CLINIC | Age: 69
End: 2019-09-25

## 2019-09-25 PROCEDURE — PHASE4: CUSTOM

## 2019-09-27 ENCOUNTER — APPOINTMENT (OUTPATIENT)
Dept: CARDIOLOGY | Facility: CLINIC | Age: 69
End: 2019-09-27
Payer: SELF-PAY

## 2019-09-27 ENCOUNTER — APPOINTMENT (OUTPATIENT)
Dept: CARDIOLOGY | Facility: CLINIC | Age: 69
End: 2019-09-27

## 2019-09-27 PROCEDURE — PHASE4: CUSTOM

## 2019-10-02 ENCOUNTER — APPOINTMENT (OUTPATIENT)
Dept: CARDIOLOGY | Facility: CLINIC | Age: 69
End: 2019-10-02
Payer: SELF-PAY

## 2019-10-02 PROCEDURE — PHASE4: CUSTOM

## 2019-10-04 ENCOUNTER — APPOINTMENT (OUTPATIENT)
Dept: CARDIOLOGY | Facility: CLINIC | Age: 69
End: 2019-10-04
Payer: SELF-PAY

## 2019-10-04 PROCEDURE — PHASE4: CUSTOM

## 2019-10-07 ENCOUNTER — APPOINTMENT (OUTPATIENT)
Dept: CARDIOLOGY | Facility: CLINIC | Age: 69
End: 2019-10-07
Payer: SELF-PAY

## 2019-10-07 PROCEDURE — PHASE4: CUSTOM

## 2019-10-09 ENCOUNTER — APPOINTMENT (OUTPATIENT)
Dept: CARDIOLOGY | Facility: CLINIC | Age: 69
End: 2019-10-09
Payer: SELF-PAY

## 2019-10-09 PROCEDURE — PHASE4: CUSTOM

## 2019-10-11 ENCOUNTER — APPOINTMENT (OUTPATIENT)
Dept: CARDIOLOGY | Facility: CLINIC | Age: 69
End: 2019-10-11
Payer: SELF-PAY

## 2019-10-11 PROCEDURE — PHASE4: CUSTOM

## 2019-10-14 ENCOUNTER — APPOINTMENT (OUTPATIENT)
Dept: CARDIOLOGY | Facility: CLINIC | Age: 69
End: 2019-10-14
Payer: SELF-PAY

## 2019-10-14 PROCEDURE — PHASE4: CUSTOM

## 2019-10-16 ENCOUNTER — APPOINTMENT (OUTPATIENT)
Dept: CARDIOLOGY | Facility: CLINIC | Age: 69
End: 2019-10-16

## 2019-10-18 ENCOUNTER — APPOINTMENT (OUTPATIENT)
Dept: CARDIOLOGY | Facility: CLINIC | Age: 69
End: 2019-10-18
Payer: SELF-PAY

## 2019-10-18 PROCEDURE — PHASE4: CUSTOM

## 2019-10-21 ENCOUNTER — APPOINTMENT (OUTPATIENT)
Dept: CARDIOLOGY | Facility: CLINIC | Age: 69
End: 2019-10-21
Payer: SELF-PAY

## 2019-10-21 PROCEDURE — PHASE4: CUSTOM

## 2019-10-23 ENCOUNTER — RX RENEWAL (OUTPATIENT)
Age: 69
End: 2019-10-23

## 2019-10-23 ENCOUNTER — APPOINTMENT (OUTPATIENT)
Dept: CARDIOLOGY | Facility: CLINIC | Age: 69
End: 2019-10-23
Payer: SELF-PAY

## 2019-10-23 PROCEDURE — PHASE4: CUSTOM

## 2019-10-24 ENCOUNTER — OTHER (OUTPATIENT)
Age: 69
End: 2019-10-24

## 2019-10-25 ENCOUNTER — APPOINTMENT (OUTPATIENT)
Dept: CARDIOLOGY | Facility: CLINIC | Age: 69
End: 2019-10-25
Payer: SELF-PAY

## 2019-10-25 PROCEDURE — PHASE4: CUSTOM

## 2019-10-28 ENCOUNTER — APPOINTMENT (OUTPATIENT)
Dept: CARDIOLOGY | Facility: CLINIC | Age: 69
End: 2019-10-28
Payer: SELF-PAY

## 2019-10-28 PROCEDURE — PHASE4: CUSTOM

## 2019-10-30 ENCOUNTER — APPOINTMENT (OUTPATIENT)
Dept: CARDIOLOGY | Facility: CLINIC | Age: 69
End: 2019-10-30

## 2019-11-01 ENCOUNTER — APPOINTMENT (OUTPATIENT)
Dept: CARDIOLOGY | Facility: CLINIC | Age: 69
End: 2019-11-01

## 2019-11-04 ENCOUNTER — RX CHANGE (OUTPATIENT)
Age: 69
End: 2019-11-04

## 2019-11-04 ENCOUNTER — APPOINTMENT (OUTPATIENT)
Dept: CARDIOLOGY | Facility: CLINIC | Age: 69
End: 2019-11-04

## 2019-11-06 ENCOUNTER — APPOINTMENT (OUTPATIENT)
Dept: CARDIOLOGY | Facility: CLINIC | Age: 69
End: 2019-11-06
Payer: COMMERCIAL

## 2019-11-06 PROCEDURE — PHASE4: CUSTOM

## 2019-11-08 ENCOUNTER — APPOINTMENT (OUTPATIENT)
Dept: CARDIOLOGY | Facility: CLINIC | Age: 69
End: 2019-11-08
Payer: SELF-PAY

## 2019-11-08 ENCOUNTER — RX RENEWAL (OUTPATIENT)
Age: 69
End: 2019-11-08

## 2019-11-08 LAB
ALBUMIN SERPL ELPH-MCNC: 4.5 G/DL
ALP BLD-CCNC: 67 U/L
ALT SERPL-CCNC: 20 U/L
ANION GAP SERPL CALC-SCNC: 13 MMOL/L
AST SERPL-CCNC: 22 U/L
BASOPHILS # BLD AUTO: 0.04 K/UL
BASOPHILS NFR BLD AUTO: 0.4 %
BILIRUB DIRECT SERPL-MCNC: 0.2 MG/DL
BILIRUB INDIRECT SERPL-MCNC: 0.4 MG/DL
BILIRUB SERPL-MCNC: 0.6 MG/DL
BUN SERPL-MCNC: 19 MG/DL
CALCIUM SERPL-MCNC: 9.7 MG/DL
CHLORIDE SERPL-SCNC: 101 MMOL/L
CO2 SERPL-SCNC: 26 MMOL/L
CREAT SERPL-MCNC: 0.91 MG/DL
EOSINOPHIL # BLD AUTO: 0.11 K/UL
EOSINOPHIL NFR BLD AUTO: 1.2 %
ESTIMATED AVERAGE GLUCOSE: 105 MG/DL
GLUCOSE SERPL-MCNC: 102 MG/DL
HBA1C MFR BLD HPLC: 5.3 %
HCT VFR BLD CALC: 46.9 %
HGB BLD-MCNC: 14.9 G/DL
IMM GRANULOCYTES NFR BLD AUTO: 0.2 %
LYMPHOCYTES # BLD AUTO: 2.37 K/UL
LYMPHOCYTES NFR BLD AUTO: 26 %
MAN DIFF?: NORMAL
MCHC RBC-ENTMCNC: 27.1 PG
MCHC RBC-ENTMCNC: 31.8 GM/DL
MCV RBC AUTO: 85.3 FL
MONOCYTES # BLD AUTO: 0.54 K/UL
MONOCYTES NFR BLD AUTO: 5.9 %
NEUTROPHILS # BLD AUTO: 6.03 K/UL
NEUTROPHILS NFR BLD AUTO: 66.3 %
PLATELET # BLD AUTO: 209 K/UL
POTASSIUM SERPL-SCNC: 4.6 MMOL/L
PROT SERPL-MCNC: 7.4 G/DL
RBC # BLD: 5.5 M/UL
RBC # FLD: 14.6 %
SODIUM SERPL-SCNC: 140 MMOL/L
WBC # FLD AUTO: 9.11 K/UL

## 2019-11-08 PROCEDURE — PHASE4: CUSTOM

## 2019-11-11 ENCOUNTER — APPOINTMENT (OUTPATIENT)
Dept: CARDIOLOGY | Facility: CLINIC | Age: 69
End: 2019-11-11
Payer: SELF-PAY

## 2019-11-11 LAB
CHOLEST SERPL-MCNC: 125 MG/DL
CHOLEST/HDLC SERPL: 2.5 RATIO
HDLC SERPL-MCNC: 50 MG/DL
LDLC SERPL CALC-MCNC: 62 MG/DL
TRIGL SERPL-MCNC: 63 MG/DL

## 2019-11-11 PROCEDURE — PHASE4: CUSTOM

## 2019-11-12 LAB
CHOLEST SERPL-MCNC: 123 MG/DL
CHOLEST/HDLC SERPL: 2.5 RATIO
HDLC SERPL-MCNC: 50 MG/DL
LDLC SERPL CALC-MCNC: 61 MG/DL
TRIGL SERPL-MCNC: 62 MG/DL

## 2019-11-13 ENCOUNTER — APPOINTMENT (OUTPATIENT)
Dept: CARDIOLOGY | Facility: CLINIC | Age: 69
End: 2019-11-13
Payer: SELF-PAY

## 2019-11-13 ENCOUNTER — NON-APPOINTMENT (OUTPATIENT)
Age: 69
End: 2019-11-13

## 2019-11-13 ENCOUNTER — APPOINTMENT (OUTPATIENT)
Dept: CARDIOLOGY | Facility: CLINIC | Age: 69
End: 2019-11-13
Payer: COMMERCIAL

## 2019-11-13 VITALS
BODY MASS INDEX: 28.77 KG/M2 | HEIGHT: 66 IN | DIASTOLIC BLOOD PRESSURE: 65 MMHG | OXYGEN SATURATION: 98 % | WEIGHT: 179 LBS | SYSTOLIC BLOOD PRESSURE: 118 MMHG | RESPIRATION RATE: 16 BRPM | HEART RATE: 66 BPM

## 2019-11-13 DIAGNOSIS — Z00.00 ENCOUNTER FOR GENERAL ADULT MEDICAL EXAMINATION W/OUT ABNORMAL FINDINGS: ICD-10-CM

## 2019-11-13 PROCEDURE — PHASE4: CUSTOM

## 2019-11-13 PROCEDURE — 99214 OFFICE O/P EST MOD 30 MIN: CPT

## 2019-11-13 PROCEDURE — 93000 ELECTROCARDIOGRAM COMPLETE: CPT

## 2019-11-13 NOTE — ASSESSMENT
[FreeTextEntry1] : ECG- SR at 66 bpm, LAE, non specific T wave abnormalities.\par \par Lab data 11/7/19\par Chol. 125\par LDL    62\par HDL    50\par Tri.      63\par Creat. 0.91\par A1C    5.3\par HGB 14.9\par \par \par Echocardiogram:\par Overall normal LV size and function.\par No significant new wall motion abnormalities.\par Mild valvular insufficiencies as stated.\par \par Regadenoson  stress test  7/19/19\par Exercised for 3 minutes and 5 seconds achieving 4 METS.\par Test converted to lexiscan\par Peak  BPM\par Peak /90\par Occ. PACs/PVCs\par SPECT imaging shows a fixed inferior wall infarct seen preoperatively.\par There is a small area of reversibility of the anterior wall that is possibly ischemia and/or soft tissue attenuation artifact.\par \par Carotid Doppler 5/15/2019\par Minimal B/L plaque\par No significant narrowing in the visualized common carotid and internal carotid arteries\par \par Renal US 5/23/2019\par No hydronephrosis\par \par \par Impression:\par 1. S/P recent CABG x 3 performed on 5/18/2019. Feels well with no anginal symptoms. Shows good exercise     capacity during cardiac rehab.\par \par 2. Blood pressure today 118/70 with added Dyazide and Hydralazine added. Repeat creat. 0.91 and stable.\par     Early AM accelerations are without any meds in his system\par \par 3. CABG Op Note does indicate that there was some difficulty finding good veins for the bypass graft to the OM     and the PDA. Reassured that is not the cause of any elevations in his blood pressures.\par \par 4. 5 lb noted weight loss, no edema on exam today.\par \par 5. Excessive concerns over blood pressure readings, obsessing over multiple details c/w a greater degree of anxiety s/p CABG. despite current Sertraline dosing 50 mg daily\par \par 6. November blood work shows good lipid control and tolerating Vytorin.\par  \par Plan\par 1. Continue to take all BP medications as prescribed.\par 2. Cannot use aspirin because of his history of GERD and very sensitive stomach.  \par 3. Continue cardiac rehab.\par 4. Educated on proper sodium intake, not to exceed more then 2,000 mg daily. \par 5. Instructed not to take his blood pressures at all for the next two weeks. After he has been instructed to stop      taking his blood pressures before his medications and only take them 2 hours after 1-2 times max. a day for     another 2 week log and call with an average.\par 6. In regards to his anxiety over his blood pressures and bypass recommended that he should empirically try         Sertraline 100 mg QD. He knows that this may take 1 month for full impact\par 7. Repeat BMP in 2 weeks to reassess renal and electrolytes.\par \par Clinical follow up in 3 months

## 2019-11-13 NOTE — PHYSICAL EXAM
[General Appearance - Well Developed] : well developed [Normal Appearance] : normal appearance [Normal Oral Mucosa] : normal oral mucosa [Normal Conjunctiva] : the conjunctiva exhibited no abnormalities [Heart Rate And Rhythm] : heart rate and rhythm were normal [Bowel Sounds] : normal bowel sounds [] : no respiratory distress [Abnormal Walk] : normal gait [Oriented To Time, Place, And Person] : oriented to person, place, and time [FreeTextEntry1] : Healing left veinous harvest graph site with no erythema  or drainage noted.\par \par Small collection noted to incision site on right calf with  fluctuance and induration. Measuring 3x2 CM

## 2019-11-13 NOTE — REASON FOR VISIT
[FreeTextEntry1] : This is a 68 y/o male patient presenting for continuation of cardiac care s/p CABG x3  performed by   in May of 2019.\par \par The patient has no preexisting history of cardiovascular disease.  He does have risk factors which include:\par 1.  Hypertension.\par 2.  Hyperlipidemia.\par 3.  A very strong family history (father with CABG and brother stents).\par \par Now\par 4. CABG x 3   5/18/19\par \par LIMA to LAD\par SVG to OM1\par SVG to PDA\par \par In October called office to update that his PCP had increased his Lisinopril from 10 mg to 20 mg QD. A two week blood pressure log  revealed systolics ranging from 136-199 with Hydralazine 25 mg TID and Dyazide 37.5/25 mg added.\par \par Very detailed blood pressure log for review shows he is ranging in the   120s-130s systolic after his medications are taken. Still voices concerns of erratic blood pressures. Usually his early AM BP prior to meds that is more elevated\par \par Denies any excessive sodium intake.\par \par Continues to participate in cardiac rehab and tolerating well. \par \par \par

## 2019-11-13 NOTE — HISTORY OF PRESENT ILLNESS
[FreeTextEntry1] : The course of his cardiac surgical inventions are as followed:\par \par 5/13/19  Caty  MIBI:\par IWMI with moderate arlyn-infarct ischemia\par \par On 5/15/2019 he underwent cardiac Catherization and was found to have significant 3VD. which he was referred for surgical management. \par \par On  5/18/2019 under went a CABG X3 ( LIMA TO LAD, SVG TO OM, SVG TO RAMUS).  \par \par He had complications post operatively with hematuria and bradycardia which have now since resolved.\par \par Admits that he is very concerned about all the details of his cardiac status, focusing extensively on meds, test results, potential problems\par \par

## 2019-11-15 ENCOUNTER — APPOINTMENT (OUTPATIENT)
Dept: CARDIOLOGY | Facility: CLINIC | Age: 69
End: 2019-11-15
Payer: SELF-PAY

## 2019-11-15 PROCEDURE — PHASE4: CUSTOM

## 2019-11-18 ENCOUNTER — APPOINTMENT (OUTPATIENT)
Dept: CARDIOLOGY | Facility: CLINIC | Age: 69
End: 2019-11-18

## 2019-11-20 ENCOUNTER — APPOINTMENT (OUTPATIENT)
Dept: CARDIOLOGY | Facility: CLINIC | Age: 69
End: 2019-11-20
Payer: SELF-PAY

## 2019-11-20 PROCEDURE — PHASE4: CUSTOM

## 2019-11-22 ENCOUNTER — APPOINTMENT (OUTPATIENT)
Dept: CARDIOLOGY | Facility: CLINIC | Age: 69
End: 2019-11-22
Payer: SELF-PAY

## 2019-11-22 PROCEDURE — PHASE4: CUSTOM

## 2019-11-25 ENCOUNTER — APPOINTMENT (OUTPATIENT)
Dept: CARDIOLOGY | Facility: CLINIC | Age: 69
End: 2019-11-25
Payer: SELF-PAY

## 2019-11-25 PROCEDURE — PHASE4: CUSTOM

## 2019-11-27 ENCOUNTER — APPOINTMENT (OUTPATIENT)
Dept: CARDIOLOGY | Facility: CLINIC | Age: 69
End: 2019-11-27
Payer: SELF-PAY

## 2019-11-27 PROCEDURE — PHASE4: CUSTOM

## 2019-12-02 ENCOUNTER — APPOINTMENT (OUTPATIENT)
Dept: CARDIOLOGY | Facility: CLINIC | Age: 69
End: 2019-12-02
Payer: SELF-PAY

## 2019-12-02 PROCEDURE — PHASE4: CUSTOM

## 2019-12-04 ENCOUNTER — APPOINTMENT (OUTPATIENT)
Dept: CARDIOLOGY | Facility: CLINIC | Age: 69
End: 2019-12-04
Payer: SELF-PAY

## 2019-12-04 PROCEDURE — PHASE4: CUSTOM

## 2019-12-06 ENCOUNTER — APPOINTMENT (OUTPATIENT)
Dept: CARDIOLOGY | Facility: CLINIC | Age: 69
End: 2019-12-06
Payer: SELF-PAY

## 2019-12-06 PROCEDURE — PHASE4: CUSTOM

## 2019-12-09 ENCOUNTER — APPOINTMENT (OUTPATIENT)
Dept: CARDIOLOGY | Facility: CLINIC | Age: 69
End: 2019-12-09
Payer: SELF-PAY

## 2019-12-09 PROCEDURE — PHASE4: CUSTOM

## 2019-12-11 ENCOUNTER — APPOINTMENT (OUTPATIENT)
Dept: CARDIOLOGY | Facility: CLINIC | Age: 69
End: 2019-12-11

## 2019-12-13 ENCOUNTER — APPOINTMENT (OUTPATIENT)
Dept: CARDIOLOGY | Facility: CLINIC | Age: 69
End: 2019-12-13
Payer: SELF-PAY

## 2019-12-13 PROCEDURE — PHASE4: CUSTOM

## 2019-12-16 ENCOUNTER — APPOINTMENT (OUTPATIENT)
Dept: CARDIOLOGY | Facility: CLINIC | Age: 69
End: 2019-12-16

## 2019-12-18 ENCOUNTER — APPOINTMENT (OUTPATIENT)
Dept: CARDIOLOGY | Facility: CLINIC | Age: 69
End: 2019-12-18
Payer: SELF-PAY

## 2019-12-18 PROCEDURE — PHASE4: CUSTOM

## 2019-12-20 ENCOUNTER — APPOINTMENT (OUTPATIENT)
Dept: CARDIOLOGY | Facility: CLINIC | Age: 69
End: 2019-12-20
Payer: SELF-PAY

## 2019-12-20 PROCEDURE — PHASE4: CUSTOM

## 2019-12-23 ENCOUNTER — APPOINTMENT (OUTPATIENT)
Dept: CARDIOLOGY | Facility: CLINIC | Age: 69
End: 2019-12-23

## 2019-12-27 ENCOUNTER — APPOINTMENT (OUTPATIENT)
Dept: CARDIOLOGY | Facility: CLINIC | Age: 69
End: 2019-12-27

## 2019-12-30 ENCOUNTER — APPOINTMENT (OUTPATIENT)
Dept: CARDIOLOGY | Facility: CLINIC | Age: 69
End: 2019-12-30
Payer: SELF-PAY

## 2019-12-30 PROCEDURE — PHASE4: CUSTOM

## 2019-12-31 ENCOUNTER — RX CHANGE (OUTPATIENT)
Age: 69
End: 2019-12-31

## 2020-01-03 ENCOUNTER — APPOINTMENT (OUTPATIENT)
Dept: CARDIOLOGY | Facility: CLINIC | Age: 70
End: 2020-01-03
Payer: SELF-PAY

## 2020-01-03 PROCEDURE — PHASE4: CUSTOM

## 2020-01-06 ENCOUNTER — APPOINTMENT (OUTPATIENT)
Dept: CARDIOLOGY | Facility: CLINIC | Age: 70
End: 2020-01-06
Payer: SELF-PAY

## 2020-01-06 PROCEDURE — PHASE4: CUSTOM

## 2020-01-08 ENCOUNTER — APPOINTMENT (OUTPATIENT)
Dept: CARDIOLOGY | Facility: CLINIC | Age: 70
End: 2020-01-08
Payer: SELF-PAY

## 2020-01-08 PROCEDURE — PHASE4: CUSTOM

## 2020-01-10 ENCOUNTER — APPOINTMENT (OUTPATIENT)
Dept: CARDIOLOGY | Facility: CLINIC | Age: 70
End: 2020-01-10
Payer: SELF-PAY

## 2020-01-10 PROCEDURE — PHASE4: CUSTOM

## 2020-01-13 ENCOUNTER — APPOINTMENT (OUTPATIENT)
Dept: CARDIOLOGY | Facility: CLINIC | Age: 70
End: 2020-01-13

## 2020-01-15 ENCOUNTER — APPOINTMENT (OUTPATIENT)
Dept: CARDIOLOGY | Facility: CLINIC | Age: 70
End: 2020-01-15
Payer: SELF-PAY

## 2020-01-15 PROCEDURE — PHASE4: CUSTOM

## 2020-01-17 ENCOUNTER — APPOINTMENT (OUTPATIENT)
Dept: CARDIOLOGY | Facility: CLINIC | Age: 70
End: 2020-01-17
Payer: SELF-PAY

## 2020-01-17 PROCEDURE — PHASE4: CUSTOM

## 2020-01-22 ENCOUNTER — APPOINTMENT (OUTPATIENT)
Dept: CARDIOLOGY | Facility: CLINIC | Age: 70
End: 2020-01-22
Payer: SELF-PAY

## 2020-01-22 PROCEDURE — PHASE4: CUSTOM

## 2020-01-24 ENCOUNTER — APPOINTMENT (OUTPATIENT)
Dept: CARDIOLOGY | Facility: CLINIC | Age: 70
End: 2020-01-24
Payer: SELF-PAY

## 2020-01-24 PROCEDURE — PHASE4: CUSTOM

## 2020-01-27 ENCOUNTER — APPOINTMENT (OUTPATIENT)
Dept: CARDIOLOGY | Facility: CLINIC | Age: 70
End: 2020-01-27
Payer: SELF-PAY

## 2020-01-27 ENCOUNTER — RX RENEWAL (OUTPATIENT)
Age: 70
End: 2020-01-27

## 2020-01-27 PROCEDURE — PHASE4: CUSTOM

## 2020-01-29 ENCOUNTER — APPOINTMENT (OUTPATIENT)
Dept: CARDIOLOGY | Facility: CLINIC | Age: 70
End: 2020-01-29
Payer: SELF-PAY

## 2020-01-29 PROCEDURE — PHASE4: CUSTOM

## 2020-01-31 ENCOUNTER — APPOINTMENT (OUTPATIENT)
Dept: CARDIOLOGY | Facility: CLINIC | Age: 70
End: 2020-01-31
Payer: SELF-PAY

## 2020-01-31 PROCEDURE — PHASE4: CUSTOM

## 2020-02-03 ENCOUNTER — APPOINTMENT (OUTPATIENT)
Dept: CARDIOLOGY | Facility: CLINIC | Age: 70
End: 2020-02-03

## 2020-02-03 ENCOUNTER — APPOINTMENT (OUTPATIENT)
Dept: CARDIOLOGY | Facility: CLINIC | Age: 70
End: 2020-02-03
Payer: SELF-PAY

## 2020-02-03 LAB
ALBUMIN SERPL ELPH-MCNC: 4.3 G/DL
ALP BLD-CCNC: 57 U/L
ALT SERPL-CCNC: 14 U/L
ANION GAP SERPL CALC-SCNC: 14 MMOL/L
AST SERPL-CCNC: 18 U/L
BILIRUB SERPL-MCNC: 0.4 MG/DL
BUN SERPL-MCNC: 17 MG/DL
CALCIUM SERPL-MCNC: 9.2 MG/DL
CHLORIDE SERPL-SCNC: 102 MMOL/L
CHOLEST SERPL-MCNC: 120 MG/DL
CHOLEST/HDLC SERPL: 2.2 RATIO
CO2 SERPL-SCNC: 25 MMOL/L
CREAT SERPL-MCNC: 0.99 MG/DL
ESTIMATED AVERAGE GLUCOSE: 108 MG/DL
GLUCOSE SERPL-MCNC: 92 MG/DL
HBA1C MFR BLD HPLC: 5.4 %
HDLC SERPL-MCNC: 53 MG/DL
LDLC SERPL CALC-MCNC: 53 MG/DL
MAGNESIUM SERPL-MCNC: 2 MG/DL
POTASSIUM SERPL-SCNC: 3.8 MMOL/L
PROT SERPL-MCNC: 6.8 G/DL
SODIUM SERPL-SCNC: 141 MMOL/L
TRIGL SERPL-MCNC: 68 MG/DL

## 2020-02-03 PROCEDURE — PHASE4: CUSTOM

## 2020-02-05 ENCOUNTER — APPOINTMENT (OUTPATIENT)
Dept: CARDIOLOGY | Facility: CLINIC | Age: 70
End: 2020-02-05
Payer: SELF-PAY

## 2020-02-05 PROCEDURE — PHASE4: CUSTOM

## 2020-02-06 ENCOUNTER — NON-APPOINTMENT (OUTPATIENT)
Age: 70
End: 2020-02-06

## 2020-02-06 ENCOUNTER — APPOINTMENT (OUTPATIENT)
Dept: CARDIOLOGY | Facility: CLINIC | Age: 70
End: 2020-02-06
Payer: COMMERCIAL

## 2020-02-06 VITALS
BODY MASS INDEX: 29.57 KG/M2 | WEIGHT: 184 LBS | SYSTOLIC BLOOD PRESSURE: 160 MMHG | OXYGEN SATURATION: 96 % | DIASTOLIC BLOOD PRESSURE: 80 MMHG | HEIGHT: 66 IN | RESPIRATION RATE: 16 BRPM | HEART RATE: 54 BPM

## 2020-02-06 VITALS — DIASTOLIC BLOOD PRESSURE: 70 MMHG | SYSTOLIC BLOOD PRESSURE: 132 MMHG

## 2020-02-06 PROCEDURE — 99214 OFFICE O/P EST MOD 30 MIN: CPT

## 2020-02-06 PROCEDURE — 93000 ELECTROCARDIOGRAM COMPLETE: CPT

## 2020-02-06 RX ORDER — HYDRALAZINE HYDROCHLORIDE 25 MG/1
25 TABLET ORAL 3 TIMES DAILY
Qty: 270 | Refills: 3 | Status: DISCONTINUED | COMMUNITY
Start: 2019-11-04 | End: 2020-02-06

## 2020-02-06 NOTE — HISTORY OF PRESENT ILLNESS
[FreeTextEntry1] : The course of his cardiac surgical inventions are as followed:\par \par 5/13/19  Caty  MIBI:\par IWMI with moderate arlyn-infarct ischemia\par \par On 5/15/2019 he underwent cardiac Catherization and was found to have significant 3VD. which he was referred for surgical management. \par \par On  5/18/2019 under went a CABG X3 ( LIMA TO LAD, SVG TO OM, SVG TO RAMUS).  \par \par Admits that he is very concerned about all the details of his cardiac status, focusing extensively on meds, test results, potential problems\par \par Concerned that he believes he is having dizzy feelings after taking the hydralazine. Most specifically he feels that when he turns to move quickly or suddenly that he becomes quite dizzy. This is not necessarily an orthostatic change.\par

## 2020-02-06 NOTE — REVIEW OF SYSTEMS
[Recent Weight Gain (___ Lbs)] : recent [unfilled] ~Ulb weight gain [Recent Weight Loss (___ Lbs)] : no recent weight loss

## 2020-02-06 NOTE — REASON FOR VISIT
[FreeTextEntry1] : This is a 68 y/o male patient presenting for continuation of cardiac care s/p CABG x3  performed by   in May of 2019.\par \par The patient has no preexisting history of cardiovascular disease.  He does have risk factors which include:\par 1.  Hypertension.\par 2.  Hyperlipidemia.\par 3.  A very strong family history (father with CABG and brother stents).\par 4. CABG x 3   5/18/19\par \par LIMA to LAD\par SVG to OM1\par SVG to PDA\par \par In October his PCP had increased his Lisinopril from 10 mg to 20 mg QD. A two week blood pressure log  revealed systolics ranging from 136-199 with Hydralazine 25 mg TID and Dyazide 37.5/25 mg added.\par \par I increased setraline to 100 mg day\par BP monitoring through cardiac rehabilitation shows that he is a bit erratic running anywhere from 110-120 systolic to 140-150 systolic.\par Believes that he is doing well in rehabilitation with continued improvement in his functional capacity.\par Denies any excessive sodium intake.\par \par Continues to participate in cardiac rehab and tolerating well. \par \par \par

## 2020-02-06 NOTE — ASSESSMENT
[FreeTextEntry1] : ECG- SR at 54 bpm, LAE, LVH non specific T wave abnormalities.\par \par Lab data 11/7/19 1/30/2020 [facility]\par Chol. 125                    120\par LDL    62                     53\par HDL    50                     53\par Tri.      63                     68\par Creat. 0.91                  0.99\par A1C    5.3                    5.4\par HGB 14.9\par \par \par Echocardiogram: 7/16/19\par Overall normal LV size and function.\par No significant new wall motion abnormalities.\par Mild valvular insufficiencies as stated.\par \par Regadenoson  stress test  7/19/19\par Exercised for 3 minutes and 5 seconds achieving 4 METS.\par Test converted to lexiscan\par Peak  BPM\par Peak /90\par Occ. PACs/PVCs\par SPECT imaging shows a fixed inferior wall infarct seen preoperatively.\par There is a small area of reversibility of the anterior wall that is possibly ischemia and/or soft tissue attenuation artifact.\par \par Carotid Doppler 5/15/2019\par Minimal B/L plaque\par No significant narrowing in the visualized common carotid and internal carotid arteries\par \par Renal US 5/23/2019\par No hydronephrosis\par \par \par Impression:\par 1. S/P recent CABG x 3 performed on 5/18/2019. Feels well with no anginal symptoms. Shows good exercise     capacity during cardiac rehab.\par \par 2. Blood pressure Controlled more recently though still a bit erratic. Uncertain to what extent anxiety placed into this despite the increase in sertraline. Patient admits that he is a bit obsessive about his cardiac health at this point in time.\par He is also fairly convinced that the hydralazine is making him dizzy\par \par 3. Laboratory data suggests very good control of his lipids with the current regimen.\par \par 4. BMI is nearly 30 and the patient does not seem to understand that that is not optimal for him. We spent a few minutes discussing this in detail\par \par 5. Excessive concerns over blood pressure readings, obsessing over multiple details c/w a greater degree of anxiety s/p CABG. despite current Sertraline dosing 50 mg daily\par \par  \par Plan\par 1. Will discontinue hydralazine\par     Will increase lisinopril to 20 g b.i.d.\par     We'll add amlodipine 5 mg a day\par     Patient to contact me in one month with his blood pressure readings.\par \par 2. Cannot use aspirin because of his history of GERD and very sensitive stomach.  \par \par 3. Continue cardiac rehab.\par \par 4. Educated on proper sodium intake, not to exceed more then 2,000 mg daily. \par \par 5. Discuss dietary approaches including use of the Mediterranean diet at about 1800 calories a day\par 6. Repeat BMP in 2 weeks to reassess renal and electrolytes.\par \par Clinical follow up in 3 months

## 2020-02-06 NOTE — PHYSICAL EXAM
[General Appearance - Well Developed] : well developed [Normal Appearance] : normal appearance [Normal Conjunctiva] : the conjunctiva exhibited no abnormalities [] : no respiratory distress [Normal Oral Mucosa] : normal oral mucosa [Heart Rate And Rhythm] : heart rate and rhythm were normal [Bowel Sounds] : normal bowel sounds [Abnormal Walk] : normal gait [Oriented To Time, Place, And Person] : oriented to person, place, and time [FreeTextEntry1] : Healing left veinous harvest graph site with no erythema  or drainage noted.\par \par Small collection noted to incision site on right calf with  fluctuance and induration. Measuring 3x2 CM

## 2020-02-07 ENCOUNTER — APPOINTMENT (OUTPATIENT)
Dept: CARDIOLOGY | Facility: CLINIC | Age: 70
End: 2020-02-07
Payer: SELF-PAY

## 2020-02-07 PROCEDURE — PHASE4: CUSTOM

## 2020-02-10 ENCOUNTER — APPOINTMENT (OUTPATIENT)
Dept: CARDIOLOGY | Facility: CLINIC | Age: 70
End: 2020-02-10
Payer: SELF-PAY

## 2020-02-10 PROCEDURE — PHASE4: CUSTOM

## 2020-02-12 ENCOUNTER — APPOINTMENT (OUTPATIENT)
Dept: CARDIOLOGY | Facility: CLINIC | Age: 70
End: 2020-02-12
Payer: SELF-PAY

## 2020-02-12 PROCEDURE — PHASE4: CUSTOM

## 2020-02-14 ENCOUNTER — APPOINTMENT (OUTPATIENT)
Dept: CARDIOLOGY | Facility: CLINIC | Age: 70
End: 2020-02-14
Payer: SELF-PAY

## 2020-02-14 PROCEDURE — PHASE4: CUSTOM

## 2020-02-17 ENCOUNTER — APPOINTMENT (OUTPATIENT)
Dept: CARDIOLOGY | Facility: CLINIC | Age: 70
End: 2020-02-17
Payer: SELF-PAY

## 2020-02-17 PROCEDURE — PHASE4: CUSTOM

## 2020-02-19 ENCOUNTER — APPOINTMENT (OUTPATIENT)
Dept: CARDIOLOGY | Facility: CLINIC | Age: 70
End: 2020-02-19
Payer: SELF-PAY

## 2020-02-19 PROCEDURE — PHASE4: CUSTOM

## 2020-02-21 ENCOUNTER — APPOINTMENT (OUTPATIENT)
Dept: CARDIOLOGY | Facility: CLINIC | Age: 70
End: 2020-02-21
Payer: SELF-PAY

## 2020-02-21 PROCEDURE — PHASE4: CUSTOM

## 2020-02-24 ENCOUNTER — APPOINTMENT (OUTPATIENT)
Dept: CARDIOLOGY | Facility: CLINIC | Age: 70
End: 2020-02-24
Payer: SELF-PAY

## 2020-02-24 PROCEDURE — PHASE4: CUSTOM

## 2020-02-26 ENCOUNTER — APPOINTMENT (OUTPATIENT)
Dept: CARDIOLOGY | Facility: CLINIC | Age: 70
End: 2020-02-26
Payer: SELF-PAY

## 2020-02-26 PROCEDURE — PHASE4: CUSTOM

## 2020-02-28 ENCOUNTER — APPOINTMENT (OUTPATIENT)
Dept: CARDIOLOGY | Facility: CLINIC | Age: 70
End: 2020-02-28
Payer: SELF-PAY

## 2020-02-28 PROCEDURE — PHASE4: CUSTOM

## 2020-03-02 ENCOUNTER — APPOINTMENT (OUTPATIENT)
Dept: CARDIOLOGY | Facility: CLINIC | Age: 70
End: 2020-03-02
Payer: SELF-PAY

## 2020-03-02 PROCEDURE — PHASE4: CUSTOM

## 2020-03-04 ENCOUNTER — APPOINTMENT (OUTPATIENT)
Dept: CARDIOLOGY | Facility: CLINIC | Age: 70
End: 2020-03-04
Payer: SELF-PAY

## 2020-03-04 PROCEDURE — PHASE4: CUSTOM

## 2020-03-06 ENCOUNTER — APPOINTMENT (OUTPATIENT)
Dept: CARDIOLOGY | Facility: CLINIC | Age: 70
End: 2020-03-06
Payer: SELF-PAY

## 2020-03-06 PROCEDURE — PHASE4: CUSTOM

## 2020-03-09 ENCOUNTER — APPOINTMENT (OUTPATIENT)
Dept: CARDIOLOGY | Facility: CLINIC | Age: 70
End: 2020-03-09
Payer: SELF-PAY

## 2020-03-09 PROCEDURE — PHASE4: CUSTOM

## 2020-03-11 ENCOUNTER — APPOINTMENT (OUTPATIENT)
Dept: CARDIOLOGY | Facility: CLINIC | Age: 70
End: 2020-03-11
Payer: SELF-PAY

## 2020-03-11 PROCEDURE — PHASE4: CUSTOM

## 2020-03-13 ENCOUNTER — APPOINTMENT (OUTPATIENT)
Dept: CARDIOLOGY | Facility: CLINIC | Age: 70
End: 2020-03-13

## 2020-03-16 ENCOUNTER — APPOINTMENT (OUTPATIENT)
Dept: CARDIOLOGY | Facility: CLINIC | Age: 70
End: 2020-03-16

## 2020-03-18 ENCOUNTER — APPOINTMENT (OUTPATIENT)
Dept: CARDIOLOGY | Facility: CLINIC | Age: 70
End: 2020-03-18

## 2020-03-20 ENCOUNTER — APPOINTMENT (OUTPATIENT)
Dept: CARDIOLOGY | Facility: CLINIC | Age: 70
End: 2020-03-20

## 2020-03-23 ENCOUNTER — APPOINTMENT (OUTPATIENT)
Dept: CARDIOLOGY | Facility: CLINIC | Age: 70
End: 2020-03-23

## 2020-03-25 ENCOUNTER — APPOINTMENT (OUTPATIENT)
Dept: CARDIOLOGY | Facility: CLINIC | Age: 70
End: 2020-03-25

## 2020-03-27 ENCOUNTER — APPOINTMENT (OUTPATIENT)
Dept: CARDIOLOGY | Facility: CLINIC | Age: 70
End: 2020-03-27

## 2020-03-30 ENCOUNTER — APPOINTMENT (OUTPATIENT)
Dept: CARDIOLOGY | Facility: CLINIC | Age: 70
End: 2020-03-30

## 2020-04-01 ENCOUNTER — APPOINTMENT (OUTPATIENT)
Dept: CARDIOLOGY | Facility: CLINIC | Age: 70
End: 2020-04-01

## 2020-04-03 ENCOUNTER — APPOINTMENT (OUTPATIENT)
Dept: CARDIOLOGY | Facility: CLINIC | Age: 70
End: 2020-04-03

## 2020-04-06 ENCOUNTER — APPOINTMENT (OUTPATIENT)
Dept: CARDIOLOGY | Facility: CLINIC | Age: 70
End: 2020-04-06

## 2020-04-08 ENCOUNTER — APPOINTMENT (OUTPATIENT)
Dept: CARDIOLOGY | Facility: CLINIC | Age: 70
End: 2020-04-08

## 2020-04-10 ENCOUNTER — APPOINTMENT (OUTPATIENT)
Dept: CARDIOLOGY | Facility: CLINIC | Age: 70
End: 2020-04-10

## 2020-04-13 ENCOUNTER — APPOINTMENT (OUTPATIENT)
Dept: CARDIOLOGY | Facility: CLINIC | Age: 70
End: 2020-04-13

## 2020-04-13 ENCOUNTER — RX RENEWAL (OUTPATIENT)
Age: 70
End: 2020-04-13

## 2020-04-15 ENCOUNTER — APPOINTMENT (OUTPATIENT)
Dept: CARDIOLOGY | Facility: CLINIC | Age: 70
End: 2020-04-15

## 2020-04-17 ENCOUNTER — APPOINTMENT (OUTPATIENT)
Dept: CARDIOLOGY | Facility: CLINIC | Age: 70
End: 2020-04-17

## 2020-04-20 ENCOUNTER — APPOINTMENT (OUTPATIENT)
Dept: CARDIOLOGY | Facility: CLINIC | Age: 70
End: 2020-04-20

## 2020-04-22 ENCOUNTER — APPOINTMENT (OUTPATIENT)
Dept: CARDIOLOGY | Facility: CLINIC | Age: 70
End: 2020-04-22

## 2020-04-24 ENCOUNTER — APPOINTMENT (OUTPATIENT)
Dept: CARDIOLOGY | Facility: CLINIC | Age: 70
End: 2020-04-24

## 2020-04-27 ENCOUNTER — APPOINTMENT (OUTPATIENT)
Dept: CARDIOLOGY | Facility: CLINIC | Age: 70
End: 2020-04-27

## 2020-04-29 ENCOUNTER — APPOINTMENT (OUTPATIENT)
Dept: CARDIOLOGY | Facility: CLINIC | Age: 70
End: 2020-04-29

## 2020-05-06 ENCOUNTER — RX RENEWAL (OUTPATIENT)
Age: 70
End: 2020-05-06

## 2020-05-18 ENCOUNTER — RX RENEWAL (OUTPATIENT)
Age: 70
End: 2020-05-18

## 2020-05-27 ENCOUNTER — RX RENEWAL (OUTPATIENT)
Age: 70
End: 2020-05-27

## 2020-06-08 ENCOUNTER — APPOINTMENT (OUTPATIENT)
Dept: CARDIOLOGY | Facility: CLINIC | Age: 70
End: 2020-06-08

## 2020-06-28 NOTE — DIETITIAN INITIAL EVALUATION ADULT. - OTHER INFO
Pt s/p CABG x 3. Pt reports fair to good appetite, currently eating grilled chicken for lunch. Pt/pt wife with questions regarding current diet order. Nutrition education initiated and menus provided. Food preferences obtained.
Patient

## 2020-07-13 ENCOUNTER — RX RENEWAL (OUTPATIENT)
Age: 70
End: 2020-07-13

## 2020-09-01 DIAGNOSIS — B02.9 ZOSTER W/OUT COMPLICATIONS: ICD-10-CM

## 2020-09-14 ENCOUNTER — RX RENEWAL (OUTPATIENT)
Age: 70
End: 2020-09-14

## 2020-09-23 ENCOUNTER — NON-APPOINTMENT (OUTPATIENT)
Age: 70
End: 2020-09-23

## 2020-09-23 ENCOUNTER — APPOINTMENT (OUTPATIENT)
Dept: CARDIOLOGY | Facility: CLINIC | Age: 70
End: 2020-09-23
Payer: COMMERCIAL

## 2020-09-23 VITALS
DIASTOLIC BLOOD PRESSURE: 75 MMHG | HEIGHT: 66 IN | BODY MASS INDEX: 30.7 KG/M2 | TEMPERATURE: 98.6 F | WEIGHT: 191 LBS | SYSTOLIC BLOOD PRESSURE: 130 MMHG | RESPIRATION RATE: 16 BRPM | HEART RATE: 62 BPM | OXYGEN SATURATION: 97 %

## 2020-09-23 DIAGNOSIS — F41.9 ANXIETY DISORDER, UNSPECIFIED: ICD-10-CM

## 2020-09-23 LAB
ALBUMIN SERPL ELPH-MCNC: 4.3 G/DL
ALP BLD-CCNC: 58 U/L
ALT SERPL-CCNC: 19 U/L
ANION GAP SERPL CALC-SCNC: 12 MMOL/L
AST SERPL-CCNC: 22 U/L
BILIRUB SERPL-MCNC: 0.4 MG/DL
BUN SERPL-MCNC: 18 MG/DL
CALCIUM SERPL-MCNC: 9 MG/DL
CHLORIDE SERPL-SCNC: 103 MMOL/L
CHOLEST SERPL-MCNC: 139 MG/DL
CHOLEST/HDLC SERPL: 2.8 RATIO
CO2 SERPL-SCNC: 26 MMOL/L
CREAT SERPL-MCNC: 0.91 MG/DL
GLUCOSE SERPL-MCNC: 88 MG/DL
HDLC SERPL-MCNC: 49 MG/DL
LDLC SERPL CALC-MCNC: 71 MG/DL
POTASSIUM SERPL-SCNC: 4.4 MMOL/L
PROT SERPL-MCNC: 6.7 G/DL
SODIUM SERPL-SCNC: 141 MMOL/L
TRIGL SERPL-MCNC: 95 MG/DL

## 2020-09-23 PROCEDURE — 93000 ELECTROCARDIOGRAM COMPLETE: CPT

## 2020-09-23 PROCEDURE — 99214 OFFICE O/P EST MOD 30 MIN: CPT

## 2020-09-23 RX ORDER — CLOPIDOGREL BISULFATE 75 MG/1
75 TABLET, FILM COATED ORAL DAILY
Qty: 90 | Refills: 3 | Status: DISCONTINUED | COMMUNITY
Start: 2019-05-01 | End: 2020-09-23

## 2020-09-23 RX ORDER — ASPIRIN 325 MG/1
325 TABLET, COATED ORAL
Qty: 30 | Refills: 11 | Status: DISCONTINUED | COMMUNITY
Start: 2020-05-06 | End: 2020-09-23

## 2020-09-23 NOTE — HISTORY OF PRESENT ILLNESS
[FreeTextEntry1] : 5/13/19  Caty  MIBI:\par IWMI with moderate arlyn-infarct ischemia\par \par On 5/15/2019 he underwent cardiac Catherization and was found to have significant 3VD. which he was referred for surgical management. \par \par On  5/18/2019 under went a CABG X3 ( LIMA TO LAD, SVG TO OM, SVG TO RAMUS).  \par \par Developed a bout of shingles on his back after the shingles vaccine.  Has subsequently healed this.\par

## 2020-09-23 NOTE — REASON FOR VISIT
[FreeTextEntry1] : This is a 69 y/o male patient presenting for continuation of cardiac care.\par \par The patient has no preexisting history of cardiovascular disease.  He does have risk factors which include:\par 1.  Hypertension.\par 2.  Hyperlipidemia.\par 3.  A very strong family history (father with CABG and brother stents).\par 4. CABG x 3   5/18/19\par \par LIMA to LAD\par SVG to OM1\par SVG to PDA\par \par \par Denies any excessive sodium intake. No FELIX, chest pain or palps.\par \par Has not worked out since cardiac rehab has shut down.  \par Has intermittently walked a bit but not recently.\par \par In early sept. admitted to taking both ASA and Plavix. Instructed to stop Plavix.  Had a bit of confusion over these instructions and remains on both at this time.\par \par DX with shingles and being treated for it.  \par

## 2020-09-23 NOTE — PHYSICAL EXAM
[General Appearance - Well Developed] : well developed [Normal Appearance] : normal appearance [Normal Conjunctiva] : the conjunctiva exhibited no abnormalities [Normal Oral Mucosa] : normal oral mucosa [] : no respiratory distress [Heart Rate And Rhythm] : heart rate and rhythm were normal [Bowel Sounds] : normal bowel sounds [Abnormal Walk] : normal gait [Oriented To Time, Place, And Person] : oriented to person, place, and time [FreeTextEntry1] : Healing left veinous harvest graph site with no erythema  or drainage noted.\par \par Small collection noted to incision site on right calf with  fluctuance and induration. Measuring 3x2 CM

## 2020-10-21 ENCOUNTER — APPOINTMENT (OUTPATIENT)
Dept: CARDIOLOGY | Facility: CLINIC | Age: 70
End: 2020-10-21

## 2020-10-21 NOTE — ASSESSMENT
Shift assessment complete. VSS. Scheduled medications given. Pt stating she's in pain but when asked to rate 0-10 patient states \"I don't know\". Attempted to use the Copeland-Baker pain scale and patient again stating \"I don't know\". Pt continuously groaning. Pt states \"every thing is sore\". Pt given prn zofran for nausea. Call light left within reach. [FreeTextEntry1] : ECG-  SR at 62 bpm, LAE.  Right ventricular conduction delay\par \par Lab data 11/7/19 1/30/20 9/21/20\par Chol. 125                    120          139\par LDL    62                     53             49\par HDL    50                     53             71\par Tri.      63                     68             95\par Creat. 0.91                  0.99\par A1C    5.3                    5.4\par HGB 14.9\par \par \par Echocardiogram: 7/16/19\par Overall normal LV size and function.\par No significant new wall motion abnormalities.\par Mild valvular insufficiencies as stated.\par \par Regadenoson  stress test  7/19/19\par Exercised for 3 minutes and 5 seconds achieving 4 METS.\par Test converted to Lexiscan\par Peak  BPM\par Peak /90\par Occ. PACs/PVCs\par SPECT imaging shows a fixed inferior wall infarct seen preoperatively.\par There is a small area of reversibility of the anterior wall that is possibly ischemia and/or soft tissue attenuation artifact.\par \par Carotid Doppler 5/15/2019\par Minimal B/L plaque\par No significant narrowing in the visualized common carotid and internal carotid arteries\par \par Renal US 5/23/2019\par No hydronephrosis\par \par \par Impression:\par 1. S/P CABG x 3  5/18/2019. Feels well with no anginal symptoms. \par \par 2. Blood pressure controlled more recently. \par      Not tracking his home blood pressure too often anymore.\par      A bit confused about medication dosing but seemingly still on lisinopril 20 mg twice daily.\par      Anxiety triggering acceleration of blood pressure less of an issue on sertraline..\par \par 3. Laboratory data suggests very good control of his lipids with the current regimen.\par \par 4. BMI is over 30 with progressive weight gain being noted.  This issue has been discussed with the pt and    identified as being requiring of attention and correction.\par \par  \par Plan\par 1.  Will continue  lisinopril to 20 g b.i.d. and amlodipine 5 mg a day\par     We need to be better informed about home BP \par \par 2.  Instructed patient to discontinue Plavix and continue the enterically coated aspirin 81 mg daily\par \par 3.  The patient was instructed to follow a symptom limited regimen of moderate aerobic exercise for 30 minutes 3 to 4 days a week. A warm up and cool down period are to be added to the regimen and small incremental changes can be made every few weeks. Any new symptoms of exercise related chest pain or dyspnea should be reported.\par \par 4. Educated on proper sodium intake, not to exceed more then 2,000 mg daily. \par \par 5. Discuss dietary approaches including use of the Mediterranean diet at about 1800 calories a day\par \par 6.  Repeat noninvasive testing including:\par      -  an echocardiogram to reassess hypertensive heart disease mitral regurgitation and a dilated aorta\par       - Exercise sestamibi stress test to reassess existing coronary artery disease status post revascularization         and ischemic heart disease status post prior inferior wall infarct.\par \par Clinical follow up in 3 months

## 2020-12-11 ENCOUNTER — APPOINTMENT (OUTPATIENT)
Dept: CARDIOLOGY | Facility: CLINIC | Age: 70
End: 2020-12-11
Payer: COMMERCIAL

## 2020-12-11 PROCEDURE — 99072 ADDL SUPL MATRL&STAF TM PHE: CPT

## 2020-12-11 PROCEDURE — 78452 HT MUSCLE IMAGE SPECT MULT: CPT

## 2020-12-11 PROCEDURE — 93015 CV STRESS TEST SUPVJ I&R: CPT

## 2020-12-11 PROCEDURE — A9500: CPT

## 2020-12-22 ENCOUNTER — APPOINTMENT (OUTPATIENT)
Dept: CARDIOLOGY | Facility: CLINIC | Age: 70
End: 2020-12-22
Payer: COMMERCIAL

## 2020-12-22 PROCEDURE — 99072 ADDL SUPL MATRL&STAF TM PHE: CPT

## 2020-12-22 PROCEDURE — 93306 TTE W/DOPPLER COMPLETE: CPT

## 2021-01-04 ENCOUNTER — APPOINTMENT (OUTPATIENT)
Dept: CARDIOLOGY | Facility: CLINIC | Age: 71
End: 2021-01-04
Payer: COMMERCIAL

## 2021-01-04 VITALS
SYSTOLIC BLOOD PRESSURE: 140 MMHG | WEIGHT: 198 LBS | OXYGEN SATURATION: 96 % | HEIGHT: 66 IN | HEART RATE: 58 BPM | TEMPERATURE: 97.9 F | DIASTOLIC BLOOD PRESSURE: 80 MMHG | RESPIRATION RATE: 16 BRPM | BODY MASS INDEX: 31.82 KG/M2

## 2021-01-04 PROCEDURE — 99072 ADDL SUPL MATRL&STAF TM PHE: CPT

## 2021-01-04 PROCEDURE — 93000 ELECTROCARDIOGRAM COMPLETE: CPT

## 2021-01-04 PROCEDURE — 99214 OFFICE O/P EST MOD 30 MIN: CPT

## 2021-01-04 NOTE — ASSESSMENT
[FreeTextEntry1] : ECG-  SR at 58  bpm, LAE.  Right ventricular conduction delay\par \par Lab data 11/7/19 1/30/20 9/21/20\par Chol. 125                    120          139\par LDL    62                     53             49\par HDL    50                     53             71\par Tri.      63                     68             95\par Creat. 0.91                  0.99\par A1C    5.3                    5.4\par HGB 14.9\par \par \par Echocardiogram 12/22/2020:\par Left ventricular ejection fraction is 60 to 65%.  No regional wall motion of maladies are noted.\par Mild left atrial enlargement\par Mild right ventricular enlargement\par Mild dilatation of the ascending aorta 4.0 cm.\par \par Exercise stress test 12/7/2020:\par Exercise 5 minutes 25 seconds\par Heart rate 1 5101% max predicted\par Occasional PVCs\par 2 mm ST segment depression noted.\par No corresponding areas of SPECT ischemia.\par Small fixed inferobasilar wall defect\par \par Echocardiogram: 7/16/19\par Overall normal LV size and function.\par No significant new wall motion abnormalities.\par Mild valvular insufficiencies as stated.\par \par Regadenoson  stress test  7/19/19\par Exercised for 3 minutes and 5 seconds achieving 4 METS.\par Test converted to Lexiscan\par Peak  BPM\par Peak /90\par Occ. PACs/PVCs\par SPECT imaging shows a fixed inferior wall infarct seen preoperatively.\par There is a small area of reversibility of the anterior wall that is possibly ischemia and/or soft tissue attenuation artifact.\par \par Carotid Doppler 5/15/2019\par Minimal B/L plaque\par No significant narrowing in the visualized common carotid and internal carotid arteries\par \par Renal US 5/23/2019\par No hydronephrosis\par \par \par Impression:\par 1. S/P CABG x 3  5/18/2019. Feels well with no anginal symptoms. \par \par 2. Blood pressure controlled more recently. \par      Not tracking his home blood pressure too often anymore.\par      Periodic dizzy spells possibly representing orthostatic changes.  Not too frequent and not disabling.\par \par 3. Laboratory data suggests very good control of his lipids with the current regimen.\par \par 4. BMI is over 30 with progressive weight gain being noted.  This issue has been discussed with the pt and    identified as being requiring of attention and correction.\par \par 5.  Reduced overall exercise tolerance.\par Patient believes that the mask impeded him.\par Despite the ECG changes SPECT  imaging does not show any significant ischemia.\par  \par Plan\par 1.  Will continue  lisinopril to 20 g b.i.d. and amlodipine 5 mg a day\par       If lightheaded spells persist may move amlodipine to the evening or reduce the diuretic dosing to fewer days a week\par     We need to be better informed about home BP \par \par 2.  Labs will be obtained with PMD in 3 months and follow-up will be in 4 months\par \par 3.  The patient was instructed to follow a symptom limited regimen of moderate aerobic exercise for 30 minutes 3 to 4 days a week. A warm up and cool down period are to be added to the regimen and small incremental changes can be made every few weeks. Any new symptoms of exercise related chest pain or dyspnea should be reported.\par \par 4. Educated on proper sodium intake, not to exceed more then 2,000 mg daily. \par \par 5. Discuss dietary approaches including use of the Mediterranean diet at about 1800 calories a day\par \par Clinical follow up in 4 months

## 2021-01-04 NOTE — REASON FOR VISIT
[FreeTextEntry1] : This is a 69 y/o male patient presenting for continuation of cardiac care.\par \par Cardiac hx includes:\par 1.  Hypertension.\par 2.  Hyperlipidemia.\par 3.  A very strong family history (father with CABG and brother stents).\par 4. CABG x 3   5/18/19\par \par LIMA to LAD\par SVG to OM1\par SVG to PDA\par \par \par Denies any excessive sodium intake. No FELIX, chest pain or palps.\par \par Has not worked out regularly since cardiac rehab has shut down.  \par Has intermittently walked a bit but not recently.\par \par Has completed noninvasive testing echocardiography and stress testing presenting today for review of both\par \par DX with shingles and being treated for it.  \par \par Blood pressure fluctuates at home but running between 1 15-1 30 systolic.\par Describes episodes of dizziness when he turns his head suddenly or when he gets up from a bent position too quickly.

## 2021-01-19 RX ORDER — KIT FOR THE PREPARATION OF TECHNETIUM TC99M SESTAMIBI 1 MG/5ML
INJECTION, POWDER, LYOPHILIZED, FOR SOLUTION PARENTERAL
Refills: 0 | Status: COMPLETED | OUTPATIENT
Start: 2021-01-19

## 2021-01-19 RX ADMIN — KIT FOR THE PREPARATION OF TECHNETIUM TC99M SESTAMIBI 0: 1 INJECTION, POWDER, LYOPHILIZED, FOR SOLUTION PARENTERAL at 00:00

## 2021-05-03 ENCOUNTER — APPOINTMENT (OUTPATIENT)
Dept: CARDIOLOGY | Facility: CLINIC | Age: 71
End: 2021-05-03
Payer: COMMERCIAL

## 2021-05-03 VITALS
HEIGHT: 66 IN | BODY MASS INDEX: 32.47 KG/M2 | WEIGHT: 202 LBS | HEART RATE: 65 BPM | OXYGEN SATURATION: 97 % | DIASTOLIC BLOOD PRESSURE: 85 MMHG | TEMPERATURE: 98.7 F | RESPIRATION RATE: 16 BRPM | SYSTOLIC BLOOD PRESSURE: 142 MMHG

## 2021-05-03 PROCEDURE — 99214 OFFICE O/P EST MOD 30 MIN: CPT

## 2021-05-03 PROCEDURE — 93000 ELECTROCARDIOGRAM COMPLETE: CPT

## 2021-05-03 PROCEDURE — 99072 ADDL SUPL MATRL&STAF TM PHE: CPT

## 2021-05-03 RX ORDER — ASPIRIN 325 MG/1
325 TABLET ORAL
Qty: 90 | Refills: 3 | Status: DISCONTINUED | COMMUNITY
Start: 1900-01-01 | End: 2021-05-03

## 2021-05-03 NOTE — HISTORY OF PRESENT ILLNESS
[FreeTextEntry1] : 5/13/19  Caty  MIBI:\par IWMI with moderate arlyn-infarct ischemia\par \par On 5/15/2019 he underwent cardiac Catherization and was found to have significant 3VD. which he was referred for surgical management. \par \par On  5/18/2019 under went a CABG X3 ( LIMA TO LAD, SVG TO OM, SVG TO RAMUS).  \par \par Patient states that a few weeks ago he had an episode of chest aching which was nonexertional nonradiating not associate with any other symptoms has since dissipated.\par \par He also had some jaw pain but subsequently developed a bout of TMJ with clicking and this has resolved as well.\par

## 2021-05-03 NOTE — ASSESSMENT
[FreeTextEntry1] : ECG-  SR at 65  bpm, LAE.  There is rightward axis deviation suggesting lead misplacement. \par \par Lab data 11/7/19 1/30/20 9/21/20 4/29/21\par Chol. 125                    120          139         140\par LDL    62                     53             49           55 \par HDL    50                     53             71          69\par Tri.      63                     68             95         75\par Creat. 0.91                  0.99\par A1C    5.3                    5.4\par HGB 14.9\par \par \par Echocardiogram 12/22/2020:\par Left ventricular ejection fraction is 60 to 65%.  No regional wall motion of maladies are noted.\par Mild left atrial enlargement\par Mild right ventricular enlargement\par Mild dilatation of the ascending aorta 4.0 cm.\par \par Exercise stress test 12/7/2020:\par Exercise 5 minutes 25 seconds\par Heart rate 1 5101% max predicted\par Occasional PVCs\par 2 mm ST segment depression noted.\par No corresponding areas of SPECT ischemia.\par Small fixed inferobasilar wall defect\par \par Echocardiogram: 7/16/19\par Overall normal LV size and function.\par No significant new wall motion abnormalities.\par Mild valvular insufficiencies as stated.\par \par Regadenoson  stress test  7/19/19\par Exercised for 3 minutes and 5 seconds achieving 4 METS.\par Test converted to Lexiscan\par Peak  BPM\par Peak /90\par Occ. PACs/PVCs\par SPECT imaging shows a fixed inferior wall infarct seen preoperatively.\par There is a small area of reversibility of the anterior wall that is possibly ischemia and/or soft tissue attenuation artifact.\par \par Carotid Doppler 5/15/2019\par Minimal B/L plaque\par No significant narrowing in the visualized common carotid and internal carotid arteries\par \par Renal US 5/23/2019\par No hydronephrosis\par \par \par Impression:\par 1. S/P CABG x 3  5/18/2019. Feels well with no anginal symptoms. \par     Atypical chest discomfort which has resolved.\par    Atypical jaw pain related to TMJ\par \par 2. Blood pressure controlled more recently. \par      tracking his home blood pressure infrequently but it seems to be reasonably controlled\par      Periodic dizzy spells possibly representing orthostatic changes.  Not too frequent and not disabling.\par \par 3. Laboratory data suggests very good control of his lipids with the current regimen.\par \par 4. BMI is over 30 with progressive weight gain being noted.  This issue has been discussed with the pt and    identified as being requiring of attention and correction.\par \par 5.  Reduced overall exercise tolerance.\par Patient believes that the mask impeded him.\par Despite the ECG changes SPECT  imaging does not show any significant ischemia.\par  \par 6.  Patient is preop for colonoscopy for routine reassessment.  He does have some substantial hemorrhoids.  There has been some bleeding reported.\par \par Plan\par 1.  Will continue  lisinopril to 20 g b.i.d. and amlodipine 5 mg a day\par       If lightheaded spells persist may move amlodipine to the evening or reduce the diuretic dosing to fewer days a week\par     We need to be better informed about home BP \par \par 2.  There is no cardiac contraindication to colonoscopy at this point in time.\par      In view of 2 of the hemorrhoids and bleeding will reduce aspirin from 325 to 81 mg a day\par      Where the patient to require hemorrhoidectomy, do not think there to be any cardiac contraindication and will just have him stop the aspirin 5 days before the procedure.\par \par 3.  The patient was instructed to follow a symptom limited regimen of moderate aerobic exercise for 30 minutes 3 to 4 days a week. A warm up and cool down period are to be added to the regimen and small incremental changes can be made every few weeks. Any new symptoms of exercise related chest pain or dyspnea should be reported.\par \par 4. Educated on proper sodium intake, not to exceed more then 2,000 mg daily. \par \par 5. Discuss dietary approaches including use of the Mediterranean diet at about 1800 calories a day\par \par 6.Labs will be obtained with PMD in 3 months and follow-up will be in 4 months\par Clinical follow up in 4 months

## 2021-05-03 NOTE — PHYSICAL EXAM
[General Appearance - Well Developed] : well developed [Normal Appearance] : normal appearance [Normal Conjunctiva] : the conjunctiva exhibited no abnormalities [Normal Oral Mucosa] : normal oral mucosa [] : no respiratory distress [Heart Rate And Rhythm] : heart rate and rhythm were normal [Bowel Sounds] : normal bowel sounds [Abnormal Walk] : normal gait [FreeTextEntry1] : Healing left veinous harvest graph site with no erythema  or drainage noted.\par \par Small collection noted to incision site on right calf with  fluctuance and induration. Measuring 3x2 CM  [Oriented To Time, Place, And Person] : oriented to person, place, and time

## 2021-05-03 NOTE — REASON FOR VISIT
[FreeTextEntry1] : This is a 71 y/o male patient presenting for continuation of cardiac care.\par \par Cardiac hx includes:\par 1.  Hypertension.\par 2.  Hyperlipidemia.\par 3.  A very strong family history (father with CABG and brother stents).\par 4. CABG x 3   5/18/19\par \par LIMA to LAD\par SVG to OM1\par SVG to PDA\par \par \par Denies any excessive sodium intake. No FELIX, chest pain or palps.\par \par Has not worked out regularly since cardiac rehab has shut down.  \par Has intermittently walked a bit but not recently.\par \par Blood pressure fluctuates at home but running between 1 15-1 30 systolic.\par Describes episodes of dizziness when he turns his head suddenly or when he gets up from a bent position too quickly.\par The symptoms have remained relatively stable, mild, and denies a fear with his quality of life.

## 2021-05-19 ENCOUNTER — APPOINTMENT (OUTPATIENT)
Dept: GASTROENTEROLOGY | Facility: CLINIC | Age: 71
End: 2021-05-19
Payer: COMMERCIAL

## 2021-05-19 VITALS
OXYGEN SATURATION: 98 % | HEIGHT: 66 IN | HEART RATE: 62 BPM | DIASTOLIC BLOOD PRESSURE: 85 MMHG | SYSTOLIC BLOOD PRESSURE: 143 MMHG | BODY MASS INDEX: 32.47 KG/M2 | WEIGHT: 202 LBS

## 2021-05-19 DIAGNOSIS — Z86.010 PERSONAL HISTORY OF COLONIC POLYPS: ICD-10-CM

## 2021-05-19 DIAGNOSIS — Z87.19 PERSONAL HISTORY OF OTHER DISEASES OF THE DIGESTIVE SYSTEM: ICD-10-CM

## 2021-05-19 DIAGNOSIS — R19.4 CHANGE IN BOWEL HABIT: ICD-10-CM

## 2021-05-19 DIAGNOSIS — R12 HEARTBURN: ICD-10-CM

## 2021-05-19 PROCEDURE — 99204 OFFICE O/P NEW MOD 45 MIN: CPT

## 2021-05-19 PROCEDURE — 99072 ADDL SUPL MATRL&STAF TM PHE: CPT

## 2021-05-19 NOTE — PHYSICAL EXAM
[General Appearance - Alert] : alert [General Appearance - In No Acute Distress] : in no acute distress [General Appearance - Well Nourished] : well nourished [General Appearance - Well Developed] : well developed [General Appearance - Well-Appearing] : healthy appearing [Sclera] : the sclera and conjunctiva were normal [Neck Appearance] : the appearance of the neck was normal [Neck Cervical Mass (___cm)] : no neck mass was observed [Jugular Venous Distention Increased] : there was no jugular-venous distention [Auscultation Breath Sounds / Voice Sounds] : lungs were clear to auscultation bilaterally [Apical Impulse] : the apical impulse was normal [Full Pulse] : the pedal pulses are present [Edema] : there was no peripheral edema [Bowel Sounds] : normal bowel sounds [Abdomen Soft] : soft [Abdomen Tenderness] : non-tender [] : no hepato-splenomegaly [Abdomen Mass (___ Cm)] : no abdominal mass palpated [Normal Sphincter Tone] : normal sphincter tone [No Rectal Mass] : no rectal mass [Internal Hemorrhoid] : internal hemorrhoids [External Hemorrhoid] : external hemorrhoids [FreeTextEntry1] : Large external hemorrhoids, nontender, no bleeding, no mass, brown stool [Cervical Lymph Nodes Enlarged Posterior Bilaterally] : posterior cervical [Cervical Lymph Nodes Enlarged Anterior Bilaterally] : anterior cervical [Supraclavicular Lymph Nodes Enlarged Bilaterally] : supraclavicular [Axillary Lymph Nodes Enlarged Bilaterally] : axillary [Femoral Lymph Nodes Enlarged Bilaterally] : femoral [Inguinal Lymph Nodes Enlarged Bilaterally] : inguinal [No CVA Tenderness] : no ~M costovertebral angle tenderness [No Spinal Tenderness] : no spinal tenderness [Abnormal Walk] : normal gait [Nail Clubbing] : no clubbing  or cyanosis of the fingernails [Musculoskeletal - Swelling] : no joint swelling seen [Motor Tone] : muscle strength and tone were normal [Skin Color & Pigmentation] : normal skin color and pigmentation [Skin Turgor] : normal skin turgor [No Focal Deficits] : no focal deficits [Oriented To Time, Place, And Person] : oriented to person, place, and time [Impaired Insight] : insight and judgment were intact [Affect] : the affect was normal

## 2021-05-19 NOTE — ASSESSMENT
[FreeTextEntry1] : Impression\par \par Heartburn\par \par Well-controlled on pantoprazole\par \par No recent upper endoscopy\par \par Large external hemorrhoids and internal hemorrhoids\par \par Rectal bleeding\par \par Change in bowel movement\par \par History of colon polyp\par \par Suggest\par \par Antireflux diet\par \par Weight loss\par \par Continue pantoprazole 40 mg a day\par \par Both upper endoscopy and colonoscopy\par \par Later rectal surgeon to evaluate and treat hemorrhoids\par \par Anesthesia and cardiology clearance\par \par Suprep\par \par The laxative, or its risks benefits and alternatives have been thoroughly reviewed with the patient in great detail. The laxative instructions have been reviewed in great detail with the patient.\par \par Risks/benefits:\par The procedure, the risks and benefits and alternatives have been reviewed in great detail with the patient.  Risks including, but not limited to sedation such as cardiac and pulmonary compromise, the procedure itself such as bleeding requiring hospitalization, transfusion, surgery, temporary or permanent colostomy.  Perforation or puncture of the requiring hospitalization, surgery, temporary colostomy.\par It has been explained to the patient that though colonoscopy is thought to be the best screening exam for colon cancer and polyps, no screening exam can find all colon polyps or cancers.  \par The patient expresses understanding of the procedure and consents to undergoing the procedure.\par \par

## 2021-05-19 NOTE — HISTORY OF PRESENT ILLNESS
[de-identified] : Ruby Lance MD\par [address]\[place] [address]\par \par Very pleasant 70-year-old gentleman here with his wife\par \par Mild chronic heartburn\par \par Does well with pantoprazole\par \par No dysphagia or odynophagia\par \par No recent upper endoscopy\par \par Appetite good and no weight loss\par \par He has large hemorrhoids\par \par He has frequent bleeding\par \par He has had colon polyps in the past\par \par Is due for screening colonoscopy\par \par This a mild change in bowel movements some soft bowel movement no family history of esophageal, stomach, colon or rectal cancer\par \par CABG 2019, on aspirin and doing well with regular cardiac follow-up

## 2021-05-19 NOTE — REASON FOR VISIT
[Initial Evaluation] : an initial evaluation [FreeTextEntry1] : Engine bowel movements, rectal bleeding, large hemorrhoids, heartburn

## 2021-05-19 NOTE — CONSULT LETTER
[Dear  ___] : Dear  [unfilled], [Consult Letter:] : I had the pleasure of evaluating your patient, [unfilled]. [Please see my note below.] : Please see my note below. [Consult Closing:] : Thank you very much for allowing me to participate in the care of this patient.  If you have any questions, please do not hesitate to contact me. [Sincerely,] : Sincerely, [FreeTextEntry2] : Ruby Lance MD\par [address] [facility] [address]\[place] [address]\par  [FreeTextEntry3] : Kevin Narvaez MD\par

## 2021-05-21 ENCOUNTER — NON-APPOINTMENT (OUTPATIENT)
Age: 71
End: 2021-05-21

## 2021-06-29 ENCOUNTER — RX RENEWAL (OUTPATIENT)
Age: 71
End: 2021-06-29

## 2021-07-02 ENCOUNTER — APPOINTMENT (OUTPATIENT)
Dept: COLORECTAL SURGERY | Facility: CLINIC | Age: 71
End: 2021-07-02
Payer: COMMERCIAL

## 2021-07-02 VITALS
HEART RATE: 62 BPM | TEMPERATURE: 93 F | HEIGHT: 66 IN | BODY MASS INDEX: 32.47 KG/M2 | DIASTOLIC BLOOD PRESSURE: 85 MMHG | RESPIRATION RATE: 16 BRPM | WEIGHT: 202 LBS | SYSTOLIC BLOOD PRESSURE: 157 MMHG

## 2021-07-02 PROCEDURE — 99244 OFF/OP CNSLTJ NEW/EST MOD 40: CPT

## 2021-07-02 PROCEDURE — 99072 ADDL SUPL MATRL&STAF TM PHE: CPT

## 2021-07-02 NOTE — REVIEW OF SYSTEMS
[As Noted in HPI] : as noted in HPI [Constipation] : constipation [Diarrhea] : diarrhea [Negative] : Heme/Lymph [FreeTextEntry7] : Bleeding hemorrhoids

## 2021-07-02 NOTE — CONSULT LETTER
[Dear  ___] : Dear  [unfilled], [Consult Letter:] : I had the pleasure of evaluating your patient, [unfilled]. [Please see my note below.] : Please see my note below. [Consult Closing:] : Thank you very much for allowing me to participate in the care of this patient.  If you have any questions, please do not hesitate to contact me. [Sincerely,] : Sincerely, [FreeTextEntry3] : Mil Merrill MD

## 2021-07-02 NOTE — HISTORY OF PRESENT ILLNESS
[FreeTextEntry1] : Consultation requested by Dr. Fco Narvaez for larger bleeding hemorrhoids. 70-year-old male with a long-standing history of bleeding from internal and external hemorrhoids symptoms have been worsening over the last several years

## 2021-07-02 NOTE — ASSESSMENT
[FreeTextEntry1] : 70-year-old male with extensive bleeding from internal and external hemorrhoids. Recommend hemorrhoidectomy. Risk and benefits of the surgery have been discussed including bleeding infection abscess fissures fistulas recurrence incontinence need for reoperations

## 2021-07-02 NOTE — PHYSICAL EXAM
[Abdomen Masses] : No abdominal masses [Abdomen Tenderness] : ~T No ~M abdominal tenderness [Tender] : nontender [Normal rectal exam] : exam was normal [Manually Reducible] : a manually reducible (grade III) [Tender, Swollen] : tender, swollen [Normal] : was normal [Gross Blood] : showed gross blood [None] : there was no rectal mass  [JVD] : no jugular venous distention  [Normal Breath Sounds] : Normal breath sounds [Normal Heart Sounds] : normal heart sounds [Normal Rate and Rhythm] : normal rate and rhythm [No Rash or Lesion] : No rash or lesion [Oriented to Person] : oriented to person [Oriented to Place] : oriented to place [Oriented to Time] : oriented to time [Calm] : calm [de-identified] : Extensive grade 3 hemorrhoids [de-identified] : Looks well in no apparent distress [de-identified] : Pupils are equal and reactive to light bilaterally [de-identified] : Moves all 4 extremities

## 2021-07-23 ENCOUNTER — APPOINTMENT (OUTPATIENT)
Dept: GASTROENTEROLOGY | Facility: AMBULATORY MEDICAL SERVICES | Age: 71
End: 2021-07-23
Payer: COMMERCIAL

## 2021-07-23 PROCEDURE — 43239 EGD BIOPSY SINGLE/MULTIPLE: CPT

## 2021-07-23 PROCEDURE — 45378 DIAGNOSTIC COLONOSCOPY: CPT

## 2021-07-28 ENCOUNTER — RESULT REVIEW (OUTPATIENT)
Age: 71
End: 2021-07-28

## 2021-07-28 ENCOUNTER — OUTPATIENT (OUTPATIENT)
Dept: OUTPATIENT SERVICES | Facility: HOSPITAL | Age: 71
LOS: 1 days | End: 2021-07-28
Payer: COMMERCIAL

## 2021-07-28 VITALS
RESPIRATION RATE: 16 BRPM | TEMPERATURE: 98 F | HEART RATE: 57 BPM | HEIGHT: 66 IN | OXYGEN SATURATION: 98 % | WEIGHT: 197.09 LBS | DIASTOLIC BLOOD PRESSURE: 73 MMHG | SYSTOLIC BLOOD PRESSURE: 132 MMHG

## 2021-07-28 DIAGNOSIS — K64.4 RESIDUAL HEMORRHOIDAL SKIN TAGS: ICD-10-CM

## 2021-07-28 DIAGNOSIS — Z98.890 OTHER SPECIFIED POSTPROCEDURAL STATES: Chronic | ICD-10-CM

## 2021-07-28 DIAGNOSIS — Z95.1 PRESENCE OF AORTOCORONARY BYPASS GRAFT: Chronic | ICD-10-CM

## 2021-07-28 DIAGNOSIS — K64.8 OTHER HEMORRHOIDS: ICD-10-CM

## 2021-07-28 LAB
A1C WITH ESTIMATED AVERAGE GLUCOSE RESULT: 5.6 % — SIGNIFICANT CHANGE UP (ref 4–5.6)
ANION GAP SERPL CALC-SCNC: 4 MMOL/L — LOW (ref 5–17)
APTT BLD: 27.3 SEC — LOW (ref 27.5–35.5)
BASOPHILS # BLD AUTO: 0.05 K/UL — SIGNIFICANT CHANGE UP (ref 0–0.2)
BASOPHILS NFR BLD AUTO: 0.7 % — SIGNIFICANT CHANGE UP (ref 0–2)
BUN SERPL-MCNC: 18 MG/DL — SIGNIFICANT CHANGE UP (ref 7–23)
CALCIUM SERPL-MCNC: 9.3 MG/DL — SIGNIFICANT CHANGE UP (ref 8.5–10.1)
CHLORIDE SERPL-SCNC: 107 MMOL/L — SIGNIFICANT CHANGE UP (ref 96–108)
CO2 SERPL-SCNC: 29 MMOL/L — SIGNIFICANT CHANGE UP (ref 22–31)
CREAT SERPL-MCNC: 0.96 MG/DL — SIGNIFICANT CHANGE UP (ref 0.5–1.3)
EOSINOPHIL # BLD AUTO: 0.12 K/UL — SIGNIFICANT CHANGE UP (ref 0–0.5)
EOSINOPHIL NFR BLD AUTO: 1.6 % — SIGNIFICANT CHANGE UP (ref 0–6)
ESTIMATED AVERAGE GLUCOSE: 114 MG/DL — SIGNIFICANT CHANGE UP (ref 68–114)
GLUCOSE SERPL-MCNC: 108 MG/DL — HIGH (ref 70–99)
HCT VFR BLD CALC: 43.5 % — SIGNIFICANT CHANGE UP (ref 39–50)
HGB BLD-MCNC: 14.1 G/DL — SIGNIFICANT CHANGE UP (ref 13–17)
IMM GRANULOCYTES NFR BLD AUTO: 0.3 % — SIGNIFICANT CHANGE UP (ref 0–1.5)
INR BLD: 1.1 RATIO — SIGNIFICANT CHANGE UP (ref 0.88–1.16)
LYMPHOCYTES # BLD AUTO: 1.94 K/UL — SIGNIFICANT CHANGE UP (ref 1–3.3)
LYMPHOCYTES # BLD AUTO: 26.3 % — SIGNIFICANT CHANGE UP (ref 13–44)
MCHC RBC-ENTMCNC: 27.1 PG — SIGNIFICANT CHANGE UP (ref 27–34)
MCHC RBC-ENTMCNC: 32.4 GM/DL — SIGNIFICANT CHANGE UP (ref 32–36)
MCV RBC AUTO: 83.7 FL — SIGNIFICANT CHANGE UP (ref 80–100)
MONOCYTES # BLD AUTO: 0.53 K/UL — SIGNIFICANT CHANGE UP (ref 0–0.9)
MONOCYTES NFR BLD AUTO: 7.2 % — SIGNIFICANT CHANGE UP (ref 2–14)
NEUTROPHILS # BLD AUTO: 4.71 K/UL — SIGNIFICANT CHANGE UP (ref 1.8–7.4)
NEUTROPHILS NFR BLD AUTO: 63.9 % — SIGNIFICANT CHANGE UP (ref 43–77)
PLATELET # BLD AUTO: 199 K/UL — SIGNIFICANT CHANGE UP (ref 150–400)
POTASSIUM SERPL-MCNC: 3.6 MMOL/L — SIGNIFICANT CHANGE UP (ref 3.5–5.3)
POTASSIUM SERPL-SCNC: 3.6 MMOL/L — SIGNIFICANT CHANGE UP (ref 3.5–5.3)
PROTHROM AB SERPL-ACNC: 12.7 SEC — SIGNIFICANT CHANGE UP (ref 10.6–13.6)
RBC # BLD: 5.2 M/UL — SIGNIFICANT CHANGE UP (ref 4.2–5.8)
RBC # FLD: 13.3 % — SIGNIFICANT CHANGE UP (ref 10.3–14.5)
SODIUM SERPL-SCNC: 140 MMOL/L — SIGNIFICANT CHANGE UP (ref 135–145)
WBC # BLD: 7.37 K/UL — SIGNIFICANT CHANGE UP (ref 3.8–10.5)
WBC # FLD AUTO: 7.37 K/UL — SIGNIFICANT CHANGE UP (ref 3.8–10.5)

## 2021-07-28 PROCEDURE — 85610 PROTHROMBIN TIME: CPT

## 2021-07-28 PROCEDURE — 36415 COLL VENOUS BLD VENIPUNCTURE: CPT

## 2021-07-28 PROCEDURE — 71046 X-RAY EXAM CHEST 2 VIEWS: CPT

## 2021-07-28 PROCEDURE — 85730 THROMBOPLASTIN TIME PARTIAL: CPT

## 2021-07-28 PROCEDURE — 93005 ELECTROCARDIOGRAM TRACING: CPT

## 2021-07-28 PROCEDURE — 93010 ELECTROCARDIOGRAM REPORT: CPT

## 2021-07-28 PROCEDURE — 83036 HEMOGLOBIN GLYCOSYLATED A1C: CPT

## 2021-07-28 PROCEDURE — G0463: CPT | Mod: 25

## 2021-07-28 PROCEDURE — 85025 COMPLETE CBC W/AUTO DIFF WBC: CPT

## 2021-07-28 PROCEDURE — 80048 BASIC METABOLIC PNL TOTAL CA: CPT

## 2021-07-28 PROCEDURE — 71046 X-RAY EXAM CHEST 2 VIEWS: CPT | Mod: 26

## 2021-07-28 RX ORDER — EZETIMIBE AND SIMVASTATIN 10; 80 MG/1; MG/1
1 TABLET, FILM COATED ORAL
Qty: 0 | Refills: 0 | DISCHARGE

## 2021-07-28 RX ORDER — FINASTERIDE 5 MG/1
1 TABLET, FILM COATED ORAL
Qty: 0 | Refills: 0 | DISCHARGE

## 2021-07-28 NOTE — H&P PST ADULT - HISTORY OF PRESENT ILLNESS
71 y.o male  71 y.o male  presents for PST with hx of painful hemorrhoids. Patient reports longstanding hx of hemorrhoids. He has had frequent flare ups over the years, painful, bleeding. He has followed with GI and treated conservatively with little relief. Patient is now opting for surgical intervention. Scheduled for Hemorrhoidectomy, Partial Proctectomy, Proctoplasty

## 2021-07-28 NOTE — H&P PST ADULT - NSICDXPASTMEDICALHX_GEN_ALL_CORE_FT
PAST MEDICAL HISTORY:  Anginal pain 97% blocked--CABG x3 5/18/2019    Anxiety and depression     BPH (benign prostatic hyperplasia)     CAD (coronary artery disease)     GERD (gastroesophageal reflux disease)     Hemorrhoids     Hyperlipidemia     Hypertension     Sleep apnea uses CPAP    SOB (shortness of breath)

## 2021-07-28 NOTE — H&P PST ADULT - NSICDXPASTSURGICALHX_GEN_ALL_CORE_FT
PAST SURGICAL HISTORY:  H/O colonoscopy 7/23/2021    H/O hernia repair Right- x4 --last 2000   Left x2 - last 2005 with mesh    History of esophagogastroduodenoscopy (EGD) 7/23/2021    S/P CABG x 3 5/18/2019

## 2021-07-28 NOTE — H&P PST ADULT - ASSESSMENT
71 y.o male scheduled for  71 y.o male scheduled for  Hemorrhoidectomy, Partial Proctectomy, Proctoplasty   Plan  1. Stop all NSAIDS, herbal supplements and vitamins for 7 days.  2. NPO at midnight.  3. Take the following medications Amlodipine, Lisinopril, Metoprolol, Pantoprazole, Modafinil  with small sips of water on the morning of your procedure/surgery.  4. Use EZ sponges as directed  5. Labs, EKG , CXR as per surgeon  6. PMD Dr Ruby Lance visit for optimization prior to surgery as per surgeon  7. COVID swab appt: 7/30/2021

## 2021-07-29 ENCOUNTER — NON-APPOINTMENT (OUTPATIENT)
Age: 71
End: 2021-07-29

## 2021-07-29 DIAGNOSIS — K64.4 RESIDUAL HEMORRHOIDAL SKIN TAGS: ICD-10-CM

## 2021-07-29 DIAGNOSIS — K64.8 OTHER HEMORRHOIDS: ICD-10-CM

## 2021-07-29 PROBLEM — F41.9 ANXIETY DISORDER, UNSPECIFIED: Chronic | Status: ACTIVE | Noted: 2021-07-28

## 2021-07-29 PROBLEM — I25.10 ATHEROSCLEROTIC HEART DISEASE OF NATIVE CORONARY ARTERY WITHOUT ANGINA PECTORIS: Chronic | Status: ACTIVE | Noted: 2021-07-28

## 2021-07-29 PROBLEM — K64.9 UNSPECIFIED HEMORRHOIDS: Chronic | Status: ACTIVE | Noted: 2021-07-28

## 2021-07-30 PROBLEM — G47.30 SLEEP APNEA, UNSPECIFIED: Chronic | Status: ACTIVE | Noted: 2021-07-28

## 2021-07-30 PROBLEM — I20.9 ANGINA PECTORIS, UNSPECIFIED: Chronic | Status: ACTIVE | Noted: 2021-07-28

## 2021-08-02 ENCOUNTER — APPOINTMENT (OUTPATIENT)
Dept: DISASTER EMERGENCY | Facility: CLINIC | Age: 71
End: 2021-08-02

## 2021-08-03 ENCOUNTER — APPOINTMENT (OUTPATIENT)
Dept: CARDIOLOGY | Facility: CLINIC | Age: 71
End: 2021-08-03
Payer: COMMERCIAL

## 2021-08-03 VITALS
BODY MASS INDEX: 32.47 KG/M2 | HEIGHT: 66 IN | DIASTOLIC BLOOD PRESSURE: 80 MMHG | WEIGHT: 202 LBS | RESPIRATION RATE: 16 BRPM | HEART RATE: 77 BPM | OXYGEN SATURATION: 98 % | SYSTOLIC BLOOD PRESSURE: 145 MMHG

## 2021-08-03 DIAGNOSIS — K62.5 HEMORRHAGE OF ANUS AND RECTUM: ICD-10-CM

## 2021-08-03 LAB — SARS-COV-2 N GENE NPH QL NAA+PROBE: NOT DETECTED

## 2021-08-03 PROCEDURE — 99214 OFFICE O/P EST MOD 30 MIN: CPT

## 2021-08-03 PROCEDURE — 93000 ELECTROCARDIOGRAM COMPLETE: CPT

## 2021-08-03 RX ORDER — SODIUM SULFATE, POTASSIUM SULFATE, MAGNESIUM SULFATE 17.5; 3.13; 1.6 G/ML; G/ML; G/ML
17.5-3.13-1.6 SOLUTION, CONCENTRATE ORAL
Qty: 1 | Refills: 0 | Status: DISCONTINUED | COMMUNITY
Start: 2021-05-19 | End: 2021-08-03

## 2021-08-03 RX ORDER — HYDROCHLOROTHIAZIDE 12.5 MG/1
12.5 TABLET ORAL DAILY
Refills: 0 | Status: DISCONTINUED | COMMUNITY
End: 2021-08-03

## 2021-08-03 NOTE — HISTORY OF PRESENT ILLNESS
[FreeTextEntry1] : 5/13/19  Caty  MIBI:\par IWMI with moderate arlyn-infarct ischemia\par \par On 5/15/2019 he underwent cardiac Catherization and was found to have significant 3VD. which he was referred for surgical management. \par \par On  5/18/2019 under went a CABG X 3 ( LIMA TO LAD, SVG TO OM, SVG TO RAMUS).  \par \par Status post a colonoscopy that did not show any significant pathology other than the hemorrhoids.\par Status post an EGD that showed numerous gastric polyps\par \par He also had some jaw pain but subsequently developed a bout of TMJ with clicking and this has resolved as well.\par

## 2021-08-03 NOTE — REASON FOR VISIT
[FreeTextEntry1] : This is a 71 y/o male patient presenting for cardiac evaluation in anticipation of a hemorrhoidectomy and for continuation of cardiac care.\par \par Cardiac hx includes:\par 1.  Hypertension.\par 2.  Hyperlipidemia.\par 3.  A very strong family history (father with CABG and brother stents).\par 4. CABG x 3   5/18/19\par \par LIMA to LAD\par SVG to OM1\par SVG to PDA\par \par \par Denies any excessive sodium intake. No FELIX, chest pain or palps.\par \par Has not worked out regularly since cardiac rehab has shut down.  \par Has intermittently walked a bit but not recently.\par \par Blood pressure fluctuates at home but running high 130s to 150s most of the time..\par Describes episodes of dizziness when he turns his head suddenly or when he gets up from a bent position too quickly.\par The symptoms have remained relatively stable, mild, and denies a fear with his quality of life.

## 2021-08-03 NOTE — ASSESSMENT
[FreeTextEntry1] : ECG-  SR at 54   bpm, LAE.  RVCD, There is rightward axis deviation suggesting lead misplacement. \par \par Lab data 11/7/19 1/30/20 9/21/20 4/29/21\par Chol. 125                    120          139         140\par LDL    62                     53             49           55 \par HDL    50                     53             71          69\par Tri.      63                     68             95         75\par Creat. 0.91                  0.99\par A1C    5.3                    5.4\par HGB 14.9\par \par \par Echocardiogram 12/22/2020:\par Left ventricular ejection fraction is 60 to 65%.  No regional wall motion of maladies are noted.\par Mild left atrial enlargement\par Mild right ventricular enlargement\par Mild dilatation of the ascending aorta 4.0 cm.\par \par Exercise stress test 12/7/2020:\par Exercise 5 minutes 25 seconds\par Heart rate 1 5101% max predicted\par Occasional PVCs\par 2 mm ST segment depression noted.\par No corresponding areas of SPECT ischemia.\par Small fixed inferobasilar wall defect\par \par Echocardiogram: 7/16/19\par Overall normal LV size and function.\par No significant new wall motion abnormalities.\par Mild valvular insufficiencies as stated.\par \par Regadenoson  stress test  7/19/19\par Exercised for 3 minutes and 5 seconds achieving 4 METS.\par Test converted to Lexiscan\par Peak  BPM\par Peak /90\par Occ. PACs/PVCs\par SPECT imaging shows a fixed inferior wall infarct seen preoperatively.\par There is a small area of reversibility of the anterior wall that is possibly ischemia and/or soft tissue attenuation artifact.\par \par Carotid Doppler 5/15/2019\par Minimal B/L plaque\par No significant narrowing in the visualized common carotid and internal carotid arteries\par \par Renal US 5/23/2019\par No hydronephrosis\par \par \par Impression:\par 1. S/P CABG x 3  5/18/2019. Feels well with no anginal symptoms. \par     Atypical chest discomfort which has resolved.\par    Atypical jaw pain related to TMJ\par \par 2. Blood pressure controlled more recently. \par      tracking his home blood pressure infrequently but it seems to be reasonably controlled\par      Periodic dizzy spells possibly representing orthostatic changes.  Not too frequent and not disabling.\par \par 3. Laboratory data suggests very good control of his lipids with the current regimen.\par \par 4. BMI is over 30 with progressive weight gain being noted.  This issue has been discussed with the pt and    identified as being requiring of attention and correction.\par \par 5.  Reduced overall exercise tolerance.\par Patient believes that the mask impeded him.\par Despite the ECG changes SPECT  imaging does not show any significant ischemia.\par  \par 6.  Patient is preop for hemorrhoidectomy  There has been some bleeding reported.\par \par Plan\par 1.  Will continue  lisinopril to 20 g b.i.d. and amlodipine 5 mg a day\par      While he has been periodically lightheaded, his blood pressure is overwhelmingly elevated and as such would      just continue him on the Dyazide daily.   This may be held the day of his surgery.\par \par 2.  There is no cardiac contraindication to hemorrhoidectomy.\par      In view of 2 of the hemorrhoids and bleeding  have reduced aspirin from 325 to 81 mg a day.  Currently this is      on hold till after surgery\par      Amlodipine, lisinopril, metoprolol, will be taken with sips of water the morning of the procedure.\par \par 3.  The patient was instructed to follow a symptom limited regimen of moderate aerobic exercise for 30 minutes 3 to 4 days a week. A warm up and cool down period are to be added to the regimen and small incremental changes can be made every few weeks. Any new symptoms of exercise related chest pain or dyspnea should be reported.\par \par 4. Educated on proper sodium intake, not to exceed more then 2,000 mg daily. \par \par 5. Discuss dietary approaches including use of the Mediterranean diet at about 1800 calories a day\par \par 6.Labs will be obtained with PMD in 3 months and follow-up will be in 4 months\par Clinical follow up in 4 months

## 2021-08-05 ENCOUNTER — APPOINTMENT (OUTPATIENT)
Dept: COLORECTAL SURGERY | Facility: HOSPITAL | Age: 71
End: 2021-08-05

## 2021-08-20 ENCOUNTER — NON-APPOINTMENT (OUTPATIENT)
Age: 71
End: 2021-08-20

## 2021-09-07 ENCOUNTER — TRANSCRIPTION ENCOUNTER (OUTPATIENT)
Age: 71
End: 2021-09-07

## 2021-11-09 ENCOUNTER — RX RENEWAL (OUTPATIENT)
Age: 71
End: 2021-11-09

## 2021-12-06 ENCOUNTER — APPOINTMENT (OUTPATIENT)
Dept: CARDIOLOGY | Facility: CLINIC | Age: 71
End: 2021-12-06
Payer: COMMERCIAL

## 2021-12-06 VITALS
OXYGEN SATURATION: 98 % | BODY MASS INDEX: 32.14 KG/M2 | DIASTOLIC BLOOD PRESSURE: 80 MMHG | HEART RATE: 58 BPM | WEIGHT: 200 LBS | SYSTOLIC BLOOD PRESSURE: 137 MMHG | HEIGHT: 66 IN | RESPIRATION RATE: 16 BRPM

## 2021-12-06 PROCEDURE — 99214 OFFICE O/P EST MOD 30 MIN: CPT

## 2021-12-06 PROCEDURE — 93000 ELECTROCARDIOGRAM COMPLETE: CPT

## 2021-12-06 RX ORDER — ASPIRIN 81 MG/1
81 TABLET, CHEWABLE ORAL DAILY
Qty: 90 | Refills: 3 | Status: DISCONTINUED | COMMUNITY
Start: 2021-05-03 | End: 2021-12-06

## 2021-12-06 NOTE — REASON FOR VISIT
[FreeTextEntry1] : This is a 70 y/o male patient presenting for cardiac evaluation \par \par Previously seen in anticipation of a hemorrhoidectomy but this was canceled.\par \par Cardiac hx includes:\par 1.  Hypertension.\par 2.  Hyperlipidemia.\par 3.  A very strong family history (father with CABG and brother stents).\par 4. CABG x 3   5/18/19\par \par LIMA to LAD\par SVG to OM1\par SVG to PDA\par \par \par More recently there has been and excessive sodium intake. No FELIX, chest pain or palps.\par \par Does not exercise regularly.\par \par Average of home blood pressure seems to be about the mid 130s systolic over 80 diastolic\par

## 2021-12-06 NOTE — HISTORY OF PRESENT ILLNESS
[FreeTextEntry1] : Patient been under a tremendous amount of stress because of his wife's various illnesses including flareups of ulcerative colitis and a cerebellar CVA. Found to have a PFO for which she will undergo a closure device.\par \par 5/13/19  Caty  MIBI:\par IWMI with moderate arlyn-infarct ischemia\par \par On 5/15/2019 he underwent cardiac Catherization and was found to have significant 3VD. which he was referred for surgical management. \par \par On  5/18/2019 under went a CABG X 3 ( LIMA TO LAD, SVG TO OM, SVG TO RAMUS).  \par \par Status post a colonoscopy that did not show any significant pathology other than the hemorrhoids.\par Status post an EGD that showed numerous gastric polyps\par \par He also had some jaw pain but subsequently developed a bout of TMJ with clicking and this has resolved as well.\par

## 2021-12-06 NOTE — ASSESSMENT
[FreeTextEntry1] : ECG-  SR at 58    bpm, LAE.  RVCD, There is rightward axis deviation \par \par Lab data 11/7/19 1/30/20 9/21/20 4/29/21---8/23/21\par Chol. 125                    120          139         140--------138\par LDL    62                     53             49           55 --------50\par HDL    50                     53             71          69---------72\par Tri.      63                     68             95         75---------81\par Creat. 0.91                  0.99\par A1C    5.3                    5.4\par HGB 14.9\par \par \par Echocardiogram 12/22/2020:\par Left ventricular ejection fraction is 60 to 65%.  No regional wall motion of maladies are noted.\par Mild left atrial enlargement\par Mild right ventricular enlargement\par Mild dilatation of the ascending aorta 4.0 cm.\par \par Exercise stress test 12/7/2020:\par Exercise 5 minutes 25 seconds\par Heart rate 1 5101% max predicted\par Occasional PVCs\par 2 mm ST segment depression noted.\par No corresponding areas of SPECT ischemia.\par Small fixed inferobasilar wall defect\par \par Echocardiogram: 7/16/19\par Overall normal LV size and function.\par No significant new wall motion abnormalities.\par Mild valvular insufficiencies as stated.\par \par Regadenoson  stress test  7/19/19\par Exercised for 3 minutes and 5 seconds achieving 4 METS.\par Test converted to Lexiscan\par Peak  BPM\par Peak /90\par Occ. PACs/PVCs\par SPECT imaging shows a fixed inferior wall infarct seen preoperatively.\par There is a small area of reversibility of the anterior wall that is possibly ischemia and/or soft tissue attenuation artifact.\par \par Carotid Doppler 5/15/2019\par Minimal B/L plaque\par No significant narrowing in the visualized common carotid and internal carotid arteries\par \par Renal US 5/23/2019\par No hydronephrosis\par \par \par Impression:\par 1. S/P CABG x 3  5/18/2019. Feels well with no anginal symptoms. \par     Atypical chest discomfort which has resolved.\par     Atypical jaw pain related to TMJ\par \par 2. Blood pressure seemingly running mildly elevated.\par      Periodic dizzy spells possibly representing orthostatic changes.  Not too frequent and not disabling.\par      Stress as well as increase in sodium probably responsible\par \par 3. Laboratory data suggests very good control of his lipids with the current regimen.\par \par 4. BMI is over 30 with progressive weight gain being noted.  This issue has been discussed with the pt and    identified as being requiring of attention and correction.\par \par 5.  Reduced overall exercise tolerance.\par Patient believes that the mask impeded him.\par Despite the ECG changes SPECT  imaging does not show any significant ischemia.\par  \par 6. Was preop for hemorrhoidectomy which was postponed.\par \par Plan\par 1.  Will continue  lisinopril to 20 g b.i.d. and amlodipine 5 mg a day\par      Because the increase in blood pressure can likely be attributed to a temporary increase in sodium as well as stress related to his wife's illnesses, we prefer to give this a few more months and see whether it can be improved upon with steadier sodium restriction and better control of his diet.\par \par 2.  There is no cardiac contraindication to hemorrhoidectomy, in the event this is rescheduled\par      In view of 2 of the hemorrhoids and bleeding  have reduced aspirin from 325 to 81 mg a day. \par      Amlodipine, lisinopril, metoprolol, will be taken with sips of water the morning of the procedure.\par \par 3.  The patient was instructed to follow a symptom limited regimen of moderate aerobic exercise for 30 minutes 3 to 4 days a week. A warm up and cool down period are to be added to the regimen and small incremental changes can be made every few weeks. Any new symptoms of exercise related chest pain or dyspnea should be reported.\par \par 4. Educated on proper sodium intake, not to exceed more then 2,000 mg daily. \par \par 5. Discuss dietary approaches including use of the Mediterranean diet at about 1800 calories a day\par \par 6.Labs will be obtained with PMD in 3 months and follow-up will be in 4 months\par Clinical follow up in 4 months

## 2021-12-23 ENCOUNTER — RX RENEWAL (OUTPATIENT)
Age: 71
End: 2021-12-23

## 2022-04-19 NOTE — H&P PST ADULT - NS PRO ABUSE SCREEN AFRAID ANYONE YN
Systems Review/Patient History Form    As you review the following list, please check any of those problems which have significantly affected you.    Constitutional  [] Recent weight gain amount  [] Recent weight loss amount  [] Fatigue  [] Weakness  [] Fever    Eyes  [] Pain  [] Redness  [] Loss of vision  [] Double or blurred vision  [] Dryness  [] Feels like something in eye  [] Itching eyes    Ears/Nose/Mouth/Throat  [] Ringing in ears  [] Loss of hearing  [] Nosebleeds  [] Loss of smell  [] Dryness in nose  [] Runny nose  [] Sore tongue  [] Bleeding gums  [] Sores in mouth  [] Loss of taste  [] Dryness of mouth  [] Frequent sore throats  [] Hoarseness  [] Difficulty in swallowing    Cardiovascular  [] Pain in chest  [] Irregular heart beat  [] Sudden changes in heart beat  [] High blood pressure  [] Heart murmurs    Respiratory  [] Shortness of breath  [] Difficulty in breathing at night  [] Swollen legs or feet  [] Cough  [] Coughing of blood  [] Wheezing (asthma)    Gastrointestinal  [] Nausea  [] Vomiting of blood or coffee ground material  [] Stomach pain relieved by food or milk  [] Jaundice  [] Increasing constipation  [] Persistent diarrhea  [] Blood in stools  [] Black stools  [] Heartburn    Genitourinary  [] Difficult urination  [] Pain or burning on urination  [] Blood in urine  [] Cloudy, \"smoky\" urine  [] Pus in urine  [] Discharge from penis/vagina  [] Getting up at night to pass urine  [] Vaginal dryness  [] Rash/ulcers  [] Sexual difficulties  [] Prostate trouble    Musculoskeletal  [x] Morning stiffness - lasting how long? \"10-20 minutes\"  [x] Joint pain  [] Muscle weakness  [] Muscle tenderness  [] Joint swelling - Joints affected in last 6 months:    Integumentary (skin and/or breast)  [] Easy bruising  [] Redness  [] Rash  [] Hives  [] Sun sensitive (sun allergy)  [] Tightness  [] Nodules/bumps  [] Hair loss  [] Color changes of hands or feet in the cold    Neurological System  []  Headaches  [] Dizziness  [] Fainting  [] Muscle spasm  [] Loss of consciousness  [] Sensitivity or pain of hands and/or feet  [] Memory loss  [] Night sweats    Psychiatric  [] Excessive worries  [] Anxiety  [] Easily losing temper  [] Depression  [] Agitation  [] Difficulty falling asleep  [] Difficulty staying asleep    Endocrine  [] Excessive thirst    Hematologic/Lymphatic  [] Swollen glands  [] Tender glands  [] Anemia  [] Bleeding tendency  [] Transfusion/when:    Allergic/Immunologic  [] Frequent sneezing  [] Increased susceptibility to infection    **patient history form incomplete**     no

## 2022-04-22 ENCOUNTER — RX RENEWAL (OUTPATIENT)
Age: 72
End: 2022-04-22

## 2022-05-06 ENCOUNTER — APPOINTMENT (OUTPATIENT)
Dept: COLORECTAL SURGERY | Facility: CLINIC | Age: 72
End: 2022-05-06
Payer: COMMERCIAL

## 2022-05-06 DIAGNOSIS — K64.8 RESIDUAL HEMORRHOIDAL SKIN TAGS: ICD-10-CM

## 2022-05-06 DIAGNOSIS — K64.4 RESIDUAL HEMORRHOIDAL SKIN TAGS: ICD-10-CM

## 2022-05-06 PROCEDURE — 99214 OFFICE O/P EST MOD 30 MIN: CPT

## 2022-05-06 NOTE — CONSULT LETTER
[Please see my note below.] : Please see my note below. [Consult Closing:] : Thank you very much for allowing me to participate in the care of this patient.  If you have any questions, please do not hesitate to contact me. [Sincerely,] : Sincerely, [FreeTextEntry3] : Mil Merrill MD

## 2022-05-06 NOTE — HISTORY OF PRESENT ILLNESS
[FreeTextEntry1] :  71-year-old male with a long-standing history of bleeding from internal and external hemorrhoids symptoms have been worsening over the last several years. he was scheduled to have surgery last year however he canceled now wishes to have surgery again

## 2022-05-06 NOTE — PHYSICAL EXAM
[Abdomen Masses] : No abdominal masses [Abdomen Tenderness] : ~T No ~M abdominal tenderness [Tender] : nontender [Normal rectal exam] : exam was normal [Manually Reducible] : a manually reducible (grade III) [Tender, Swollen] : tender, swollen [Normal] : was normal [None] : there was no rectal mass  [Gross Blood] : showed gross blood [JVD] : no jugular venous distention  [Normal Breath Sounds] : Normal breath sounds [Normal Heart Sounds] : normal heart sounds [Normal Rate and Rhythm] : normal rate and rhythm [No Rash or Lesion] : No rash or lesion [Oriented to Person] : oriented to person [Oriented to Place] : oriented to place [Oriented to Time] : oriented to time [Calm] : calm [de-identified] : Extensive grade 3 hemorrhoids [de-identified] : Looks well in no apparent distress [de-identified] : Pupils are equal and reactive to light bilaterally [de-identified] : Moves all 4 extremities

## 2022-05-06 NOTE — ASSESSMENT
[FreeTextEntry1] : 71-year-old male with extensive bleeding from internal and external hemorrhoids. Recommend hemorrhoidectomy. Risk and benefits of the surgery have been discussed including bleeding infection abscess fissures fistulas recurrence incontinence need for reoperations

## 2022-05-12 ENCOUNTER — APPOINTMENT (OUTPATIENT)
Dept: DERMATOLOGY | Facility: CLINIC | Age: 72
End: 2022-05-12
Payer: COMMERCIAL

## 2022-05-12 ENCOUNTER — RESULT REVIEW (OUTPATIENT)
Age: 72
End: 2022-05-12

## 2022-05-12 PROCEDURE — 99203 OFFICE O/P NEW LOW 30 MIN: CPT | Mod: 25

## 2022-05-12 PROCEDURE — 17271 DSTR MAL LES S/N/H/F/G 0.6-1: CPT

## 2022-05-18 ENCOUNTER — RESULT REVIEW (OUTPATIENT)
Age: 72
End: 2022-05-18

## 2022-05-18 ENCOUNTER — OUTPATIENT (OUTPATIENT)
Dept: OUTPATIENT SERVICES | Facility: HOSPITAL | Age: 72
LOS: 1 days | End: 2022-05-18
Payer: COMMERCIAL

## 2022-05-18 VITALS
HEIGHT: 66 IN | HEART RATE: 55 BPM | TEMPERATURE: 98 F | OXYGEN SATURATION: 98 % | SYSTOLIC BLOOD PRESSURE: 149 MMHG | DIASTOLIC BLOOD PRESSURE: 88 MMHG | WEIGHT: 201.06 LBS | RESPIRATION RATE: 16 BRPM

## 2022-05-18 DIAGNOSIS — Z98.890 OTHER SPECIFIED POSTPROCEDURAL STATES: Chronic | ICD-10-CM

## 2022-05-18 DIAGNOSIS — Z01.818 ENCOUNTER FOR OTHER PREPROCEDURAL EXAMINATION: ICD-10-CM

## 2022-05-18 DIAGNOSIS — K64.4 RESIDUAL HEMORRHOIDAL SKIN TAGS: ICD-10-CM

## 2022-05-18 DIAGNOSIS — Z95.1 PRESENCE OF AORTOCORONARY BYPASS GRAFT: Chronic | ICD-10-CM

## 2022-05-18 LAB
APTT BLD: 27.8 SEC — SIGNIFICANT CHANGE UP (ref 27.5–35.5)
INR BLD: 1.07 RATIO — SIGNIFICANT CHANGE UP (ref 0.88–1.16)
PROTHROM AB SERPL-ACNC: 12.4 SEC — SIGNIFICANT CHANGE UP (ref 10.5–13.4)

## 2022-05-18 PROCEDURE — 93010 ELECTROCARDIOGRAM REPORT: CPT

## 2022-05-18 PROCEDURE — G0463: CPT | Mod: 25

## 2022-05-18 PROCEDURE — 93005 ELECTROCARDIOGRAM TRACING: CPT

## 2022-05-18 PROCEDURE — 71046 X-RAY EXAM CHEST 2 VIEWS: CPT

## 2022-05-18 PROCEDURE — 71046 X-RAY EXAM CHEST 2 VIEWS: CPT | Mod: 26

## 2022-05-18 PROCEDURE — 85730 THROMBOPLASTIN TIME PARTIAL: CPT

## 2022-05-18 PROCEDURE — 85610 PROTHROMBIN TIME: CPT

## 2022-05-18 PROCEDURE — 36415 COLL VENOUS BLD VENIPUNCTURE: CPT

## 2022-05-18 NOTE — H&P PST ADULT - FALL HARM RISK - UNIVERSAL INTERVENTIONS
Bed in lowest position, wheels locked, appropriate side rails in place/Call bell, personal items and telephone in reach/Instruct patient to call for assistance before getting out of bed or chair/Non-slip footwear when patient is out of bed/[place] to call system/Physically safe environment - no spills, clutter or unnecessary equipment/Purposeful Proactive Rounding/Room/bathroom lighting operational, light cord in reach

## 2022-05-18 NOTE — H&P PST ADULT - NSICDXPASTMEDICALHX_GEN_ALL_CORE_FT
PAST MEDICAL HISTORY:  Anginal pain 97% blocked--CABG x3 5/18/2019    Anxiety and depression     BPH (benign prostatic hyperplasia)     CA skin, basal cell left rear neck    CAD (coronary artery disease)     Early cataract     GERD (gastroesophageal reflux disease)     Hemorrhoids     Hyperlipidemia     Hypertension     Sleep apnea uses CPAP    SOB (shortness of breath)

## 2022-05-18 NOTE — H&P PST ADULT - HISTORY OF PRESENT ILLNESS
71 y.o male  presents for PST with hx of painful hemorrhoids. Patient reports longstanding hx of hemorrhoids. He has had frequent flare ups over the years, painful, bleeding. He has followed with GI and treated conservatively with little relief. Patient is now opting for surgical intervention. Scheduled for Hemorrhoidectomy, Partial Proctectomy, Proctoplasty

## 2022-05-18 NOTE — H&P PST ADULT - ASSESSMENT
71 y.o male  presents for PST with hx of painful hemorrhoids. Patient reports longstanding hx of hemorrhoids. He has had frequent flare ups over the years, painful, bleeding. He has followed with GI and treated conservatively with little relief. Patient is now opting for surgical intervention. Scheduled for Hemorrhoidectomy, Partial Proctectomy, Proctoplasty     Plan:  1. PST instructions given ; NPO status/  instructions to be given by ASU   2. Pt instructed to take following meds on day of surgery:   3. Pt instructed to take routine evening medications unless indicated   4. Stop NSAIDS ( Aspirin Alev Motrin Mobic Diclofenac), herbal supplements , MVI , Vitamin fish oil 7 days prior to surgery  unless   directed by surgeon or cardiologist;   5. Medical Optimization  with    6. EZ wash instructions given & mupirocin instructions given  7. Labs EKG CXR as per surgeon request   8. Pt instructed to self quarantine after Covid test   9. Covid Testing scheduled Pt notified and aware  10. Pt denies covid symptoms shortness of breath fever cough    71 y.o male  presents for PST with hx of painful hemorrhoids. Patient reports longstanding hx of hemorrhoids. He has had frequent flare ups over the years, painful, bleeding. He has followed with GI and treated conservatively with little relief. Patient is now opting for surgical intervention. Scheduled for Hemorrhoidectomy, Partial Proctectomy, Proctoplasty     Plan:  1. PST instructions given ; NPO status/  instructions to be given by ASU   2. Pt instructed to take following meds on day of surgery: amlodipine lisinopril metoprolol pantoprazole  3. Pt instructed to take routine evening medications unless indicated   4. Stop NSAIDS ( Aspirin Alev Motrin Mobic Diclofenac), herbal supplements , MVI , Vitamin fish oil 7 days prior to surgery  unless   directed by surgeon or cardiologist;   5. Medical Optimization  with Dr Lance and cardio Dr Osorio  6. EZ wash instructions given & mupirocin instructions given  7. Labs EKG CXR as per surgeon request   8. Pt instructed to self quarantine after Covid test   9. Covid Testing scheduled Pt notified and aware  10. Pt denies covid symptoms shortness of breath fever cough

## 2022-05-18 NOTE — H&P PST ADULT - HEALTH CARE MAINTENANCE
Pt denies travel out of [facility] for the past 14 days Pt denies  travel internationally for the past 21 days

## 2022-05-19 DIAGNOSIS — Z01.818 ENCOUNTER FOR OTHER PREPROCEDURAL EXAMINATION: ICD-10-CM

## 2022-05-19 DIAGNOSIS — K64.4 RESIDUAL HEMORRHOIDAL SKIN TAGS: ICD-10-CM

## 2022-05-23 ENCOUNTER — APPOINTMENT (OUTPATIENT)
Dept: CARDIOLOGY | Facility: CLINIC | Age: 72
End: 2022-05-23
Payer: COMMERCIAL

## 2022-05-23 VITALS
HEIGHT: 66 IN | SYSTOLIC BLOOD PRESSURE: 140 MMHG | RESPIRATION RATE: 16 BRPM | OXYGEN SATURATION: 98 % | DIASTOLIC BLOOD PRESSURE: 80 MMHG | WEIGHT: 204 LBS | HEART RATE: 58 BPM | BODY MASS INDEX: 32.78 KG/M2

## 2022-05-23 DIAGNOSIS — K64.8 OTHER HEMORRHOIDS: ICD-10-CM

## 2022-05-23 DIAGNOSIS — Z01.818 ENCOUNTER FOR OTHER PREPROCEDURAL EXAMINATION: ICD-10-CM

## 2022-05-23 DIAGNOSIS — R07.9 CHEST PAIN, UNSPECIFIED: ICD-10-CM

## 2022-05-23 PROCEDURE — 99214 OFFICE O/P EST MOD 30 MIN: CPT

## 2022-05-23 PROCEDURE — 93000 ELECTROCARDIOGRAM COMPLETE: CPT

## 2022-05-23 RX ORDER — LISINOPRIL 20 MG/1
20 TABLET ORAL TWICE DAILY
Qty: 180 | Refills: 3 | Status: DISCONTINUED | COMMUNITY
Start: 2019-07-01 | End: 2022-05-23

## 2022-05-23 NOTE — ASSESSMENT
[FreeTextEntry1] : ECG-  SR at 58    bpm, LAE.  RVCD, There is rightward axis deviation \par \par -----11/7/19-----1/30/20---9/21/2---4/29/21---8/23/21---5/3/22\par Chol---125--------120-------139------140--------138-------131\par LDL-----62---------53---------49--------55 --------50--------51\par HDL-----50---------53---------71--------69---------72-------66\par Tri.------63---------68---------95--------75---------81-------66\par Creat---0.91-----0.99\par D6I----5.3--------5.4\par HGB---14.9\par \par Echocardiogram 12/22/2020:\par Left ventricular ejection fraction is 60 to 65%.  No regional wall motion of maladies are noted.\par Mild left atrial enlargement\par Mild right ventricular enlargement\par Mild dilatation of the ascending aorta 4.0 cm.\par \par Exercise stress test 12/7/2020:\par Exercise 5 minutes 25 seconds\par Heart rate 1 5101% max predicted\par Occasional PVCs\par 2 mm ST segment depression noted.\par No corresponding areas of SPECT ischemia.\par Small fixed inferobasilar wall defect\par \par Echocardiogram: 7/16/19\par Overall normal LV size and function.\par No significant new wall motion abnormalities.\par Mild valvular insufficiencies as stated.\par \par Regadenoson  stress test  7/19/19\par Exercised for 3 minutes and 5 seconds achieving 4 METS.\par Test converted to Lexiscan\par Peak  BPM\par Peak /90\par Occ. PACs/PVCs\par SPECT imaging shows a fixed inferior wall infarct seen preoperatively.\par There is a small area of reversibility of the anterior wall that is possibly ischemia and/or soft tissue attenuation artifact.\par \par Carotid Doppler 5/15/2019\par Minimal B/L plaque\par No significant narrowing in the visualized common carotid and internal carotid arteries\par \par Renal US 5/23/2019\par No hydronephrosis\par \par \par Impression:\par 1. S/P CABG x 3  5/18/2019. Feels well with no anginal symptoms. \par     Atypical chest discomfort which has resolved.\par     Atypical jaw pain related to TMJ\par \par 2. Blood pressure continues to be mildly elevated\par      Periodic dizzy spells which seem to be more related to changes in position i.e.  Disequilibrium\par      \par 3. Laboratory data suggests very good control of his lipids with the current regimen.\par \par 4. BMI 33 with progressive weight gain being noted.  This issue has been discussed with the pt and identified as being requiring of attention and correction.\par \par 5.  Reduced overall exercise tolerance.\par Patient believes that the mask impeded him.\par Despite the ECG changes SPECT  imaging does not show any significant ischemia.\par  \par 6.  Once again is preop for hemorrhoidectomy which had been previously postponed\par \par Plan\par 1.  Will discontinue lisinopril to 20 g b.i.d. \par      Begin valsartan 320 p.o. daily.  \par     Continue amlodipine 5 mg a day and metoprolol 25 p.o. the\par     Patient understands he is to contact us in 1 month with his blood pressures\par \par 2.  There is no cardiac contraindication to hemorrhoidectomy, in the event this is rescheduled\par     Aspirin has been stopped\par      Amlodipine, lisinopril, metoprolol, will be taken with sips of water the morning of the procedure.  (Valsartan will commence after surgery)\par \par 3.  The patient was instructed to follow a symptom limited regimen of moderate aerobic exercise for 30 minutes 3 to 4 days a week. A warm up and cool down period are to be added to the regimen and small incremental changes can be made every few weeks. Any new symptoms of exercise related chest pain or dyspnea should be reported.\par \par 4. Educated on proper sodium intake, not to exceed more then 2,000 mg daily. \par \par 5. Discuss dietary approaches including use of the Mediterranean diet at about 1800 calories a day\par \par 6.Labs will be obtained with PMD in 3 months and follow-up will be in 4 months\par Clinical follow up in 4 months

## 2022-05-23 NOTE — REASON FOR VISIT
[FreeTextEntry1] : This is a 70 y/o male patient presenting for cardiac evaluation in anticipation of a hemorrhoidectomy\par (Previously postponed due to his wife illnesses)\par \par Cardiac hx includes:\par 1.  Hypertension.\par 2.  Hyperlipidemia.\par 3.  A very strong family history (father with CABG and brother stents).\par 4. CABG x 3   5/18/19\par \par LIMA to LAD\par SVG to OM1\par SVG to PDA\par \par  No FELIX, chest pain or palps.\par \par Does not exercise regularly.\par \par Average of home blood pressure seems to be about the mid to high 130s systolic over mid 80 diastolic\par

## 2022-05-23 NOTE — HISTORY OF PRESENT ILLNESS
[FreeTextEntry1] : Patient been under a tremendous amount of stress because of his wife's various illnesses including flareups of ulcerative colitis and a cerebellar CVA. Found to have a PFO for which she will undergo a closure device.\par \par 5/13/19  Caty  MIBI:\par IWMI with moderate arlyn-infarct ischemia\par \par  5/15/2019- cardiac Catherization and was found to have significant 3VD. which he was referred for surgical management. \par \par  5/18/2019 - CABG X 3 ( LIMA TO LAD, SVG TO OM, SVG TO RAMUS).  \par \par Status post a colonoscopy that did not show any significant pathology other than the hemorrhoids.\par Status post an EGD that showed numerous gastric polyps\par \par He also had some jaw pain but subsequently developed a bout of TMJ with clicking and this has resolved as well.\par

## 2022-05-24 ENCOUNTER — RX RENEWAL (OUTPATIENT)
Age: 72
End: 2022-05-24

## 2022-05-24 PROBLEM — C44.91 BASAL CELL CARCINOMA OF SKIN, UNSPECIFIED: Chronic | Status: ACTIVE | Noted: 2022-05-18

## 2022-05-24 PROBLEM — H26.9 UNSPECIFIED CATARACT: Chronic | Status: ACTIVE | Noted: 2022-05-18

## 2022-05-25 RX ORDER — SODIUM CHLORIDE 9 MG/ML
1000 INJECTION, SOLUTION INTRAVENOUS
Refills: 0 | Status: DISCONTINUED | OUTPATIENT
Start: 2022-05-26 | End: 2022-05-26

## 2022-05-25 RX ORDER — OXYCODONE HYDROCHLORIDE 5 MG/1
5 TABLET ORAL ONCE
Refills: 0 | Status: DISCONTINUED | OUTPATIENT
Start: 2022-05-26 | End: 2022-05-26

## 2022-05-25 RX ORDER — ONDANSETRON 8 MG/1
4 TABLET, FILM COATED ORAL EVERY 6 HOURS
Refills: 0 | Status: DISCONTINUED | OUTPATIENT
Start: 2022-05-26 | End: 2022-05-26

## 2022-05-25 RX ORDER — FENTANYL CITRATE 50 UG/ML
50 INJECTION INTRAVENOUS
Refills: 0 | Status: DISCONTINUED | OUTPATIENT
Start: 2022-05-26 | End: 2022-05-26

## 2022-05-26 ENCOUNTER — EMERGENCY (EMERGENCY)
Facility: HOSPITAL | Age: 72
LOS: 0 days | Discharge: HOME | End: 2022-05-26
Attending: EMERGENCY MEDICINE
Payer: COMMERCIAL

## 2022-05-26 ENCOUNTER — TRANSCRIPTION ENCOUNTER (OUTPATIENT)
Age: 72
End: 2022-05-26

## 2022-05-26 ENCOUNTER — RESULT REVIEW (OUTPATIENT)
Age: 72
End: 2022-05-26

## 2022-05-26 ENCOUNTER — OUTPATIENT (OUTPATIENT)
Dept: INPATIENT UNIT | Facility: HOSPITAL | Age: 72
LOS: 1 days | Discharge: HOME | End: 2022-05-26
Payer: COMMERCIAL

## 2022-05-26 ENCOUNTER — APPOINTMENT (OUTPATIENT)
Dept: COLORECTAL SURGERY | Facility: HOSPITAL | Age: 72
End: 2022-05-26
Payer: COMMERCIAL

## 2022-05-26 VITALS
HEART RATE: 60 BPM | TEMPERATURE: 98 F | RESPIRATION RATE: 16 BRPM | HEIGHT: 66 IN | WEIGHT: 201.94 LBS | SYSTOLIC BLOOD PRESSURE: 142 MMHG | OXYGEN SATURATION: 99 % | DIASTOLIC BLOOD PRESSURE: 80 MMHG

## 2022-05-26 VITALS
SYSTOLIC BLOOD PRESSURE: 119 MMHG | OXYGEN SATURATION: 98 % | TEMPERATURE: 98 F | DIASTOLIC BLOOD PRESSURE: 59 MMHG | HEART RATE: 55 BPM | RESPIRATION RATE: 14 BRPM

## 2022-05-26 VITALS
TEMPERATURE: 99 F | RESPIRATION RATE: 22 BRPM | OXYGEN SATURATION: 100 % | DIASTOLIC BLOOD PRESSURE: 75 MMHG | HEART RATE: 65 BPM | SYSTOLIC BLOOD PRESSURE: 137 MMHG

## 2022-05-26 VITALS — WEIGHT: 201.94 LBS | HEIGHT: 66 IN

## 2022-05-26 DIAGNOSIS — Z98.890 OTHER SPECIFIED POSTPROCEDURAL STATES: Chronic | ICD-10-CM

## 2022-05-26 DIAGNOSIS — K21.9 GASTRO-ESOPHAGEAL REFLUX DISEASE WITHOUT ESOPHAGITIS: ICD-10-CM

## 2022-05-26 DIAGNOSIS — Z99.89 DEPENDENCE ON OTHER ENABLING MACHINES AND DEVICES: ICD-10-CM

## 2022-05-26 DIAGNOSIS — G47.30 SLEEP APNEA, UNSPECIFIED: ICD-10-CM

## 2022-05-26 DIAGNOSIS — F32.A DEPRESSION, UNSPECIFIED: ICD-10-CM

## 2022-05-26 DIAGNOSIS — N40.0 BENIGN PROSTATIC HYPERPLASIA WITHOUT LOWER URINARY TRACT SYMPTOMS: ICD-10-CM

## 2022-05-26 DIAGNOSIS — Z95.1 PRESENCE OF AORTOCORONARY BYPASS GRAFT: Chronic | ICD-10-CM

## 2022-05-26 DIAGNOSIS — K64.8 OTHER HEMORRHOIDS: ICD-10-CM

## 2022-05-26 DIAGNOSIS — I25.10 ATHEROSCLEROTIC HEART DISEASE OF NATIVE CORONARY ARTERY WITHOUT ANGINA PECTORIS: ICD-10-CM

## 2022-05-26 DIAGNOSIS — K64.4 RESIDUAL HEMORRHOIDAL SKIN TAGS: ICD-10-CM

## 2022-05-26 DIAGNOSIS — I10 ESSENTIAL (PRIMARY) HYPERTENSION: ICD-10-CM

## 2022-05-26 DIAGNOSIS — R33.9 RETENTION OF URINE, UNSPECIFIED: ICD-10-CM

## 2022-05-26 DIAGNOSIS — Z79.82 LONG TERM (CURRENT) USE OF ASPIRIN: ICD-10-CM

## 2022-05-26 DIAGNOSIS — Z85.828 PERSONAL HISTORY OF OTHER MALIGNANT NEOPLASM OF SKIN: ICD-10-CM

## 2022-05-26 DIAGNOSIS — F41.9 ANXIETY DISORDER, UNSPECIFIED: ICD-10-CM

## 2022-05-26 DIAGNOSIS — Z95.1 PRESENCE OF AORTOCORONARY BYPASS GRAFT: ICD-10-CM

## 2022-05-26 DIAGNOSIS — E78.5 HYPERLIPIDEMIA, UNSPECIFIED: ICD-10-CM

## 2022-05-26 DIAGNOSIS — I51.7 CARDIOMEGALY: ICD-10-CM

## 2022-05-26 DIAGNOSIS — K64.9 UNSPECIFIED HEMORRHOIDS: ICD-10-CM

## 2022-05-26 DIAGNOSIS — G47.00 INSOMNIA, UNSPECIFIED: ICD-10-CM

## 2022-05-26 DIAGNOSIS — K62.1 RECTAL POLYP: ICD-10-CM

## 2022-05-26 LAB
APPEARANCE UR: CLEAR — SIGNIFICANT CHANGE UP
BILIRUB UR-MCNC: NEGATIVE — SIGNIFICANT CHANGE UP
COLOR SPEC: YELLOW — SIGNIFICANT CHANGE UP
DIFF PNL FLD: ABNORMAL
GLUCOSE UR QL: NEGATIVE — SIGNIFICANT CHANGE UP
KETONES UR-MCNC: ABNORMAL
LEUKOCYTE ESTERASE UR-ACNC: NEGATIVE — SIGNIFICANT CHANGE UP
NITRITE UR-MCNC: NEGATIVE — SIGNIFICANT CHANGE UP
PH UR: 5 — SIGNIFICANT CHANGE UP (ref 5–8)
PROT UR-MCNC: 15
SP GR SPEC: 1.02 — SIGNIFICANT CHANGE UP (ref 1.01–1.02)
UROBILINOGEN FLD QL: NEGATIVE — SIGNIFICANT CHANGE UP

## 2022-05-26 PROCEDURE — 99284 EMERGENCY DEPT VISIT MOD MDM: CPT

## 2022-05-26 PROCEDURE — 46260 REMOVE IN/EX HEM GROUPS 2+: CPT

## 2022-05-26 PROCEDURE — 88304 TISSUE EXAM BY PATHOLOGIST: CPT

## 2022-05-26 PROCEDURE — 51702 INSERT TEMP BLADDER CATH: CPT

## 2022-05-26 PROCEDURE — 99283 EMERGENCY DEPT VISIT LOW MDM: CPT

## 2022-05-26 PROCEDURE — 87086 URINE CULTURE/COLONY COUNT: CPT

## 2022-05-26 PROCEDURE — 88305 TISSUE EXAM BY PATHOLOGIST: CPT | Mod: 26

## 2022-05-26 PROCEDURE — 88305 TISSUE EXAM BY PATHOLOGIST: CPT

## 2022-05-26 PROCEDURE — 81001 URINALYSIS AUTO W/SCOPE: CPT

## 2022-05-26 PROCEDURE — 88304 TISSUE EXAM BY PATHOLOGIST: CPT | Mod: 26

## 2022-05-26 RX ORDER — AMLODIPINE BESYLATE 2.5 MG/1
1 TABLET ORAL
Qty: 0 | Refills: 0 | DISCHARGE

## 2022-05-26 RX ORDER — OXYCODONE AND ACETAMINOPHEN 5; 325 MG/1; MG/1
1 TABLET ORAL EVERY 4 HOURS
Refills: 0 | Status: DISCONTINUED | OUTPATIENT
Start: 2022-05-26 | End: 2022-05-26

## 2022-05-26 RX ORDER — LISINOPRIL 2.5 MG/1
0 TABLET ORAL
Qty: 0 | Refills: 0 | DISCHARGE

## 2022-05-26 RX ORDER — MOMETASONE FUROATE 1 MG/G
1 CREAM TOPICAL
Qty: 0 | Refills: 0 | DISCHARGE

## 2022-05-26 RX ORDER — FINASTERIDE 5 MG/1
1 TABLET, FILM COATED ORAL
Qty: 0 | Refills: 0 | DISCHARGE

## 2022-05-26 RX ORDER — METOPROLOL TARTRATE 50 MG
1 TABLET ORAL
Qty: 0 | Refills: 0 | DISCHARGE

## 2022-05-26 RX ORDER — EZETIMIBE AND SIMVASTATIN 10; 80 MG/1; MG/1
1 TABLET, FILM COATED ORAL
Qty: 0 | Refills: 0 | DISCHARGE

## 2022-05-26 RX ORDER — IBUPROFEN 200 MG
600 TABLET ORAL ONCE
Refills: 0 | Status: COMPLETED | OUTPATIENT
Start: 2022-05-26 | End: 2022-05-26

## 2022-05-26 RX ORDER — TRIAMTERENE/HYDROCHLOROTHIAZID 75 MG-50MG
1 TABLET ORAL
Qty: 0 | Refills: 0 | DISCHARGE

## 2022-05-26 RX ORDER — ONDANSETRON 8 MG/1
4 TABLET, FILM COATED ORAL EVERY 6 HOURS
Refills: 0 | Status: DISCONTINUED | OUTPATIENT
Start: 2022-05-26 | End: 2022-05-26

## 2022-05-26 RX ORDER — MODAFINIL 200 MG/1
1 TABLET ORAL
Qty: 0 | Refills: 0 | DISCHARGE

## 2022-05-26 RX ORDER — UBIDECARENONE 100 MG
200 CAPSULE ORAL
Qty: 0 | Refills: 0 | DISCHARGE

## 2022-05-26 RX ORDER — SERTRALINE 25 MG/1
1 TABLET, FILM COATED ORAL
Qty: 0 | Refills: 1 | DISCHARGE

## 2022-05-26 RX ORDER — ASPIRIN/CALCIUM CARB/MAGNESIUM 324 MG
1 TABLET ORAL
Qty: 0 | Refills: 1 | DISCHARGE

## 2022-05-26 RX ORDER — CHOLECALCIFEROL (VITAMIN D3) 125 MCG
1 CAPSULE ORAL
Qty: 0 | Refills: 0 | DISCHARGE

## 2022-05-26 RX ADMIN — Medication 600 MILLIGRAM(S): at 23:10

## 2022-05-26 NOTE — ED PROVIDER NOTE - NSFOLLOWUPINSTRUCTIONS_ED_ALL_ED_FT
please follow up with your urologist in 2-3 days.  keep guerrero in place until you follow up.   drink plenty of fluids.   return to ED for any concerns

## 2022-05-26 NOTE — BRIEF OPERATIVE NOTE - NSICDXBRIEFPROCEDURE_GEN_ALL_CORE_FT
PROCEDURES:  Simple hemorrhoidectomy, internal and external 26-May-2022 09:29:59  Anamika Mireles  Exam under anesthesia, anus 26-May-2022 09:30:23  Anamika Mireles  Polypectomy, transanal 26-May-2022 09:32:08  Anamika Mireles

## 2022-05-26 NOTE — ASU DISCHARGE PLAN (ADULT/PEDIATRIC) - CARE PROVIDER_API CALL
Mil Merrill)  ColonRectal Surgery; [place]  [address] [address]  Phone: [phone]  Fax: [phone]  Follow Up Time: 1 month

## 2022-05-26 NOTE — ED PROVIDER NOTE - PATIENT PORTAL LINK FT
You can access the FollowMyHealth Patient Portal offered by [facility] by registering at the following website: [email or web address]. By joining [employer]’s FollowMyHealth portal, you will also be able to view your health information using other applications (apps) compatible with our system.

## 2022-05-26 NOTE — ED PROVIDER NOTE - CARE PROVIDER_API CALL
Stephon Huerta)  Urology  [address], [address] [place] [address]  Phone: [phone]  Fax: [phone]  Follow Up Time:

## 2022-05-26 NOTE — ASU PATIENT PROFILE, ADULT - FALL HARM RISK - UNIVERSAL INTERVENTIONS
Bed in lowest position, wheels locked, appropriate side rails in place/Call bell, personal items and telephone in reach/Instruct patient to call for assistance before getting out of bed or chair/Non-slip footwear when patient is out of bed/[place] to call system/Physically safe environment - no spills, clutter or unnecessary equipment/Purposeful Proactive Rounding/Room/bathroom lighting operational, light cord in reach

## 2022-05-26 NOTE — BRIEF OPERATIVE NOTE - NSICDXBRIEFPOSTOP_GEN_ALL_CORE_FT
POST-OP DIAGNOSIS:  Internal and external hemorrhoids without complication 26-May-2022 09:33:05  Anamika Mireles  Anal polyp 26-May-2022 09:33:28  Anamika Mireles

## 2022-05-26 NOTE — ED PROVIDER NOTE - CLINICAL SUMMARY MEDICAL DECISION MAKING FREE TEXT BOX
pt s/p hemorrhoid surgery now with urinary retention.  will guerrero and have f/u  guerrero placed by RN

## 2022-05-26 NOTE — ED PROVIDER NOTE - OBJECTIVE STATEMENT
72 y/o male in ED c/o urinary retention today.   pt states had hemorrhoid surgery done today and was able to urinate on d/c after surgery but unable to since.   pt states abd feels full.   pt states h/o enlarged prostate.   pt denies any fever, HA, cp, sob, n/v/d/abd pain.   states contacted surgeon on call and advised to come to ED for guerrero.

## 2022-05-26 NOTE — ED ADULT TRIAGE NOTE - CHIEF COMPLAINT QUOTE
Pt comes to the ED stating that he had hemorrhoid surgery this am with Dr. Craft and that he was discharged home. Pt states that he urinated prior to discharge as well as when he got home, but that throughout the course of the day he has not been able to urinate again.

## 2022-05-26 NOTE — ED ADULT NURSE NOTE - NS ED NURSE LEVEL OF CONSCIOUSNESS AFFECT
Timeout completed with Dr. Antonio Clayton. Pt ready to be transported to PICU with RN on cardiac monitor x 3 at this time. Calm

## 2022-05-26 NOTE — ED ADULT NURSE NOTE - OBJECTIVE STATEMENT
Pt comes to the ED stating that he had hemorrhoid surgery this am with Dr. Craft and that he was discharged home. Pt states that he urinated prior to discharge as well as when he got home, but that throughout the course of the day he has not been able to urinate again. Pt denies pain, states he has mild discomfort.

## 2022-05-26 NOTE — BRIEF OPERATIVE NOTE - NSICDXBRIEFPREOP_GEN_ALL_CORE_FT
PRE-OP DIAGNOSIS:  Hemorrhoids 26-May-2022 09:32:25  Anamika Mireles  Anal polyp 26-May-2022 09:32:32  Anamika Mireles

## 2022-05-29 LAB
CULTURE RESULTS: NO GROWTH — SIGNIFICANT CHANGE UP
SPECIMEN SOURCE: SIGNIFICANT CHANGE UP

## 2022-05-31 ENCOUNTER — APPOINTMENT (OUTPATIENT)
Dept: UROLOGY | Facility: CLINIC | Age: 72
End: 2022-05-31
Payer: COMMERCIAL

## 2022-05-31 VITALS
BODY MASS INDEX: 32.47 KG/M2 | HEART RATE: 88 BPM | DIASTOLIC BLOOD PRESSURE: 90 MMHG | WEIGHT: 202 LBS | HEIGHT: 66 IN | OXYGEN SATURATION: 96 % | SYSTOLIC BLOOD PRESSURE: 166 MMHG

## 2022-05-31 DIAGNOSIS — N40.0 BENIGN PROSTATIC HYPERPLASIA WITHOUT LOWER URINARY TRACT SYMPMS: ICD-10-CM

## 2022-05-31 PROCEDURE — 99204 OFFICE O/P NEW MOD 45 MIN: CPT

## 2022-05-31 NOTE — REVIEW OF SYSTEMS
[Negative] : Heme/Lymph [Poor quality erections] : Poor quality erections [Seen by urologist before (Name)  ___] : Preciously seen by a urologist: [unfilled] [Wake up at night to urinate  How many times?  ___] : wakes up to urinate [unfilled] times during the night [see HPI] : see HPI

## 2022-05-31 NOTE — END OF VISIT
[FreeTextEntry3] : I recommended that he start Bethanechol and follow up with a CMG in 1 week. Pt was adamant that he wanted his catheter removed although I advised that if he does have it removed, he would likely return here or the ER afterwards to have it replaced. He is scheduled for urodynamic study in 1 week. Bethanechol is prescribed. I also prescribed Bactrim DS BID for 10 days.

## 2022-05-31 NOTE — PHYSICAL EXAM
[General Appearance - Well Developed] : well developed [General Appearance - Well Nourished] : well nourished [Normal Appearance] : normal appearance [Well Groomed] : well groomed [General Appearance - In No Acute Distress] : no acute distress [Edema] : no peripheral edema [Respiration, Rhythm And Depth] : normal respiratory rhythm and effort [Exaggerated Use Of Accessory Muscles For Inspiration] : no accessory muscle use [Abdomen Soft] : soft [Abdomen Tenderness] : non-tender [Costovertebral Angle Tenderness] : no ~M costovertebral angle tenderness [Urethral Meatus] : meatus normal [Urinary Bladder Findings] : the bladder was normal on palpation [Scrotum] : the scrotum was normal [Testes Mass (___cm)] : there were no testicular masses [No Prostate Nodules] : no prostate nodules [Normal Station and Gait] : the gait and station were normal for the patient's age [] : no rash [No Focal Deficits] : no focal deficits [Oriented To Time, Place, And Person] : oriented to person, place, and time [Affect] : the affect was normal [Mood] : the mood was normal [Not Anxious] : not anxious [No Palpable Adenopathy] : no palpable adenopathy [FreeTextEntry1] : Due to the fact that pt has just had a hemorrhoidectomy, a rectal exam was deffered at this time.

## 2022-05-31 NOTE — HISTORY OF PRESENT ILLNESS
[FreeTextEntry1] : 71M presents today with a CC of post-procedural retention.Pt has history of going into retention after procedures and had a hemorrhoidectomy last week. He has had similar problems after hernia operations as well. He states that he is on Finasteride but had stopped his Tamsulosin with it as it caused him to lose control of urination.

## 2022-06-02 ENCOUNTER — NON-APPOINTMENT (OUTPATIENT)
Age: 72
End: 2022-06-02

## 2022-06-02 DIAGNOSIS — R33.9 RETENTION OF URINE, UNSPECIFIED: ICD-10-CM

## 2022-06-09 ENCOUNTER — APPOINTMENT (OUTPATIENT)
Dept: UROLOGY | Facility: CLINIC | Age: 72
End: 2022-06-09

## 2022-06-22 ENCOUNTER — RX CHANGE (OUTPATIENT)
Age: 72
End: 2022-06-22

## 2022-06-22 RX ORDER — BETHANECHOL CHLORIDE 50 MG/1
50 TABLET ORAL
Qty: 60 | Refills: 3 | Status: DISCONTINUED | COMMUNITY
Start: 2022-05-31 | End: 2022-06-22

## 2022-06-24 ENCOUNTER — APPOINTMENT (OUTPATIENT)
Dept: COLORECTAL SURGERY | Facility: CLINIC | Age: 72
End: 2022-06-24
Payer: COMMERCIAL

## 2022-06-24 VITALS
SYSTOLIC BLOOD PRESSURE: 148 MMHG | WEIGHT: 200 LBS | DIASTOLIC BLOOD PRESSURE: 88 MMHG | HEART RATE: 56 BPM | HEIGHT: 66 IN | BODY MASS INDEX: 32.14 KG/M2

## 2022-06-24 DIAGNOSIS — Z09 ENCOUNTER FOR FOLLOW-UP EXAMINATION AFTER COMPLETED TREATMENT FOR CONDITIONS OTHER THAN MALIGNANT NEOPLASM: ICD-10-CM

## 2022-06-24 PROCEDURE — 99024 POSTOP FOLLOW-UP VISIT: CPT

## 2022-06-24 NOTE — HISTORY OF PRESENT ILLNESS
[FreeTextEntry1] : 71 year old male who presents to the office s/p excisional hemorrhoidectomy for multiple interna/external hemorrhoids.  Overall, doing well. His post operative course was complicated by urinary retention requiring a guerrero but that now since been removed.  No further issues with urinary retention reports urinary frequency, especially at night.  He has a history of BPH and follows with a urologist.  Denies fever/chills, n/v.

## 2022-06-24 NOTE — ASSESSMENT
[FreeTextEntry1] : 71 year old male s/p hemorrhoidectomy.  Doing well. Surgical site w/o s/s of infection.  Surgical pathology reviewed with patient. All questions/concerns addressed to his/wife satisfaction.\par \par s/p hemorrhoidectomy\par - regular diet\par - fiber, stool softeners, sitz baths\par \par RTO 6-8 weeks to f/u with Dr. Merrill

## 2022-06-24 NOTE — DATA REVIEWED
[FreeTextEntry1] : surgical pathology reviewed - benign\par \par \par  Pathology             Final\par \par No Documents Attached\par \par \par \par \par   [facility] Accession Number : [id number]\par Patient:   ANÍBAL RAMEY\par \par \par Accession:                             [id number]\par \par Collected Date/Time:                   5/26/2022 08:35 EDT\par Received Date/Time:                    5/26/2022 14:19 EDT\par \par Surgical Pathology Report - Auth (Verified)\par \par Specimen(s) Submitted\par 1  Left lateral internal and external hemorrhoids and rectal mucosal\par prolapse\par 2  Right posterior internal and external hemorrhoids with rectal mucosal\par prolapse\par 3  Rectal polyp\par \par Final Diagnosis\par \par 1. Anorectal tissue, left lateral internal/external, excision:\par -Hemorrhoids\par \par 2. Anorectal tissue, right posterior, internal/external, excision:\par -Hemorrhoids\par -Overlying mucosa with acute inflammation, reactive changes and\par granulation tissue\par \par 3. Rectum:\par -Inflammatory polyp\par Verified by: ZAK AHN M.D.\par (Electronic Signature)\par Reported on: 06/02/22 13:29 EDT, [address] [facility]\par Phone: [phone]   Fax: [phone]\par _________________________________________________________________\par \par \par Comment\par 3. All or portions of this case have been reviewed at intradepartmental\par  conference (2, 3).\par \par Clinical Information\par Rectocele, internal and external hemorrhoids with bleeding, rectal mucosal\par prolaspse.\par \par Gross Description\par 1.  Received: In formalin labeled  "left lateral internal and external\par hemorrhoids and rectal mucosal prolapse"\par Size:  One, 3 x 2.6 x 2 cm\par Description:  Grey-violet, wrinkled with a cauterized margin\par Cut Surface:  Pink-tan and soft with blood-filled tortuous vessels\par Submitted:  Representative sections in one cassette: 1A\par \par 2.  Received: In formalin labeled  "right posterior internal and external\par hemorrhoids with rectal mucosal prolapse"\par \par Size:  One, 3 x 2.8 x 1.9 cm\par Description:  Grey-violet to tan-brown and glistening, wrinkled with a\par cauterized margin\par Cut Surface:  Pink-tan and soft with blood-filled tortuous vessels\par Submitted:  Representative sections in one cassette: 2A\par \par 3.  Received: In formalin labeled  "rectal polyp"\par Size:  One fragment measuring 0.8 x 0.8 x 0.5 cm\par Description:  Tan-gray polypoid and granular soft tissue fragment. The base\par is inked black he specimen is trisected.\par Submitted:  Entirely in one cassette: 3A\par \par Claudia Wiggins 05/27/2022 08:58 AM\par \par Disclaimer\par In addition to other data that may appear on the specimen containers, all\par labels have been inspected to confirm the presence of the patient's name\par and date of birth.\par \par  \par \par  Ordered by: JOSE ARMANDO GILBERT       Collected/Examined: 26May2022 08:35AM       \par Verified by: ALEXIA ESTEVEZ 03Jun2022 09:18AM       \par  Result Communication: No patient communication needed at this time;\par Stage: Final       \par  Performed at: [facility]       Resulted: 02Jun2022 01:29PM       Last Updated: 03Jun2022 09:18AM       Accession: [id number]

## 2022-06-24 NOTE — PHYSICAL EXAM
[Respiratory Effort] : normal respiratory effort [No Rash or Lesion] : No rash or lesion [Alert] : alert [Oriented to Person] : oriented to person [Oriented to Place] : oriented to place [Oriented to Time] : oriented to time [Calm] : calm [de-identified] : surgical incision clean, no erythema, drainage or odor noted [de-identified] : no acute distress, well appearing [de-identified] : KARL x4 [de-identified] : warm and dry

## 2022-06-29 ENCOUNTER — APPOINTMENT (OUTPATIENT)
Dept: UROLOGY | Facility: CLINIC | Age: 72
End: 2022-06-29

## 2022-07-21 ENCOUNTER — RX RENEWAL (OUTPATIENT)
Age: 72
End: 2022-07-21

## 2022-08-12 ENCOUNTER — NON-APPOINTMENT (OUTPATIENT)
Age: 72
End: 2022-08-12

## 2022-08-21 NOTE — H&P PST ADULT - CORNEAL ABRASION RISK
PATIENT NOW FULL COMFORT CARE FAMILY AT BEDSIDE. HEART RATE 105 AND RESP
SHALLOW 20'S FAMILY AT BEDSIDE none

## 2022-09-30 ENCOUNTER — LABORATORY RESULT (OUTPATIENT)
Age: 72
End: 2022-09-30

## 2022-10-03 ENCOUNTER — APPOINTMENT (OUTPATIENT)
Dept: CARDIOLOGY | Facility: CLINIC | Age: 72
End: 2022-10-03

## 2022-10-03 VITALS
HEIGHT: 66 IN | SYSTOLIC BLOOD PRESSURE: 138 MMHG | WEIGHT: 198 LBS | DIASTOLIC BLOOD PRESSURE: 90 MMHG | RESPIRATION RATE: 16 BRPM | BODY MASS INDEX: 31.82 KG/M2 | HEART RATE: 60 BPM | OXYGEN SATURATION: 98 %

## 2022-10-03 PROCEDURE — 93000 ELECTROCARDIOGRAM COMPLETE: CPT

## 2022-10-03 PROCEDURE — 99214 OFFICE O/P EST MOD 30 MIN: CPT | Mod: 25

## 2022-10-03 RX ORDER — SULFAMETHOXAZOLE AND TRIMETHOPRIM 800; 160 MG/1; MG/1
800-160 TABLET ORAL TWICE DAILY
Qty: 20 | Refills: 0 | Status: DISCONTINUED | COMMUNITY
Start: 2022-05-31 | End: 2022-10-03

## 2022-10-03 RX ORDER — NITROFURANTOIN MACROCRYSTALS 100 MG/1
100 CAPSULE ORAL 3 TIMES DAILY
Qty: 21 | Refills: 0 | Status: DISCONTINUED | COMMUNITY
Start: 2022-06-02 | End: 2022-10-03

## 2022-10-03 RX ORDER — ASPIRIN 81 MG/1
81 TABLET ORAL DAILY
Qty: 90 | Refills: 0 | Status: DISCONTINUED | COMMUNITY
Start: 2022-05-23 | End: 2022-10-03

## 2022-10-03 RX ORDER — BETHANECHOL CHLORIDE 50 MG/1
50 TABLET ORAL
Qty: 180 | Refills: 2 | Status: DISCONTINUED | COMMUNITY
Start: 2022-06-22 | End: 2022-10-03

## 2022-10-03 NOTE — ASSESSMENT
[FreeTextEntry1] : ECG-  SR at 60    bpm, LAE.  RVCD, There is rightward axis deviation \par \par -----11/7/19-----1/30/20---9/21/2---4/29/21---8/23/21---5/3/22\par Chol---125--------120-------139------140--------138-------131\par LDL-----62---------53---------49--------55 --------50--------51\par HDL-----50---------53---------71--------69---------72-------66\par Tri.------63---------68---------95--------75---------81-------63\par Creat---0.91-----0.99\par K7G----0.3--------5.4\par HGB---14.9\par \par Echocardiogram 12/22/2020:\par Left ventricular ejection fraction is 60 to 65%.  No regional wall motion of maladies are noted.\par Mild left atrial enlargement\par Mild right ventricular enlargement\par Mild dilatation of the ascending aorta 4.0 cm.\par \par Exercise stress test 12/7/2020:\par Exercise 5 minutes 25 seconds\par Heart rate 1 5101% max predicted\par Occasional PVCs\par 2 mm ST segment depression noted.\par No corresponding areas of SPECT ischemia.\par Small fixed inferobasilar wall defect\par \par Echocardiogram: 7/16/19\par Overall normal LV size and function.\par No significant new wall motion abnormalities.\par Mild valvular insufficiencies as stated.\par \par Regadenoson  stress test  7/19/19\par Exercised for 3 minutes and 5 seconds achieving 4 METS.\par Test converted to Lexiscan\par Peak  BPM\par Peak /90\par Occ. PACs/PVCs\par SPECT imaging shows a fixed inferior wall infarct seen preoperatively.\par There is a small area of reversibility of the anterior wall that is possibly ischemia and/or soft tissue attenuation artifact.\par \par Carotid Doppler 5/15/2019\par Minimal B/L plaque\par No significant narrowing in the visualized common carotid and internal carotid arteries\par \par Renal US 5/23/2019\par No hydronephrosis\par \par \par Impression:\par 1. S/P CABG x 3  5/18/2019. Feels well with no anginal symptoms. \par     Atypical chest discomfort which has resolved.\par     Atypical jaw pain related to TMJ\par \par 2. Blood pressure is well controlled with transition from lisinopril to valsartan 320 mg a day.\par     Office pressure is typically high.  Periodic dizzy spells which seem to be more related to changes in position i.e.       Disequilibrium\par      \par 3. Laboratory data suggests very good control of his lipids with the current regimen.\par \par 4. BMI 33 with progressive weight gain being noted.  This issue has been discussed with the pt and identified as being requiring of attention and correction.\par \par 5.  Reduced overall exercise tolerance.\par Patient believes that the mask impeded him.\par Despite the ECG changes SPECT  imaging does not show any significant ischemia.\par  \par \par Plan\par 1.  Will continue valsartan 320 p.o. moved to nightly\par     Continue amlodipine 5 mg a day and metoprolol 25 p.o. the\par     Counseled the patient to continue to monitor blood pressure a few times a week.\par \par 2.  No indication for any other change in current medical regimen.\par \par 3.  The patient was instructed to follow a symptom limited regimen of moderate aerobic exercise for 30 minutes 3 to 4 days a week. A warm up and cool down period are to be added to the regimen and small incremental changes can be made every few weeks. Any new symptoms of exercise related chest pain or dyspnea should be reported.\par \par 4. Educated on proper sodium intake, not to exceed more then 2,000 mg daily. \par \par 5. Discuss dietary approaches including use of the Mediterranean diet at about 1800 calories a day\par \par 6.Labs will be obtained with PMD in 3 months and follow-up will be in 4 months\par Clinical follow up in 4 months

## 2022-10-03 NOTE — HISTORY OF PRESENT ILLNESS
[FreeTextEntry1] : 5/13/19  Caty  MIBI:\par IWMI with moderate arlyn-infarct ischemia\par \par  5/15/2019- cardiac Catherization and was found to have significant 3VD. which he was referred for surgical management. \par \par  5/18/2019 - CABG X 3 ( LIMA TO LAD, SVG TO OM, SVG TO RAMUS).  \par \par Was converted from lisinopril to valsartan 320 mg on 7/2/2022.

## 2022-10-03 NOTE — REASON FOR VISIT
[FreeTextEntry1] : This is a 71 y/o male patient presenting for cardiac evaluation post hemorrhoidectomy 5/26/22\par Well tolerated\par \par Cardiac hx includes:\par 1.  Hypertension.\par 2.  Hyperlipidemia.\par 3.  A very strong family history (father with CABG and brother stents).\par 4. CABG x 3   5/18/19\par \par LIMA to LAD\par SVG to OM1\par SVG to PDA\par \par  No FELIX, chest pain or palps.\par \par Does not exercise regularly.\par \par Patient transition to valsartan 320 mg a day (in place of lisinopril) 7/2/2022.\par Home blood pressure record indicates an improvement with systolics 120s and diastolics 70s.\par

## 2022-11-15 ENCOUNTER — APPOINTMENT (OUTPATIENT)
Dept: DERMATOLOGY | Facility: CLINIC | Age: 72
End: 2022-11-15

## 2022-11-15 PROCEDURE — 99213 OFFICE O/P EST LOW 20 MIN: CPT

## 2022-11-29 ENCOUNTER — APPOINTMENT (OUTPATIENT)
Dept: COLORECTAL SURGERY | Facility: CLINIC | Age: 72
End: 2022-11-29

## 2023-01-24 ENCOUNTER — RX RENEWAL (OUTPATIENT)
Age: 73
End: 2023-01-24

## 2023-02-07 ENCOUNTER — RX RENEWAL (OUTPATIENT)
Age: 73
End: 2023-02-07

## 2023-02-08 LAB
ALBUMIN SERPL ELPH-MCNC: 4.5 G/DL
ALP BLD-CCNC: 73 U/L
ALT SERPL-CCNC: 15 U/L
ANION GAP SERPL CALC-SCNC: 9 MMOL/L
AST SERPL-CCNC: 18 U/L
BILIRUB SERPL-MCNC: 0.4 MG/DL
BUN SERPL-MCNC: 19 MG/DL
CALCIUM SERPL-MCNC: 10.1 MG/DL
CHLORIDE SERPL-SCNC: 101 MMOL/L
CHOLEST SERPL-MCNC: 146 MG/DL
CO2 SERPL-SCNC: 30 MMOL/L
CREAT SERPL-MCNC: 1.01 MG/DL
EGFR: 79 ML/MIN/1.73M2
ESTIMATED AVERAGE GLUCOSE: 117 MG/DL
GLUCOSE SERPL-MCNC: 91 MG/DL
HBA1C MFR BLD HPLC: 5.7 %
HDLC SERPL-MCNC: 46 MG/DL
LDLC SERPL CALC-MCNC: 81 MG/DL
NONHDLC SERPL-MCNC: 101 MG/DL
POTASSIUM SERPL-SCNC: 4.8 MMOL/L
PROT SERPL-MCNC: 7.6 G/DL
SODIUM SERPL-SCNC: 140 MMOL/L
TRIGL SERPL-MCNC: 97 MG/DL

## 2023-02-15 ENCOUNTER — APPOINTMENT (OUTPATIENT)
Dept: CARDIOLOGY | Facility: CLINIC | Age: 73
End: 2023-02-15
Payer: COMMERCIAL

## 2023-02-15 VITALS
OXYGEN SATURATION: 97 % | WEIGHT: 193 LBS | BODY MASS INDEX: 31.02 KG/M2 | HEART RATE: 66 BPM | HEIGHT: 66 IN | DIASTOLIC BLOOD PRESSURE: 80 MMHG | RESPIRATION RATE: 16 BRPM | SYSTOLIC BLOOD PRESSURE: 140 MMHG

## 2023-02-15 PROCEDURE — 99214 OFFICE O/P EST MOD 30 MIN: CPT | Mod: 25

## 2023-02-15 PROCEDURE — 93000 ELECTROCARDIOGRAM COMPLETE: CPT

## 2023-02-15 RX ORDER — ASPIRIN ENTERIC COATED TABLETS 81 MG 81 MG/1
81 TABLET, DELAYED RELEASE ORAL DAILY
Qty: 90 | Refills: 3 | Status: DISCONTINUED | COMMUNITY
Start: 2021-12-06 | End: 2023-02-15

## 2023-02-15 NOTE — REASON FOR VISIT
[FreeTextEntry1] : This is a 71 y/o male patient presenting for cardiac evaluation \par \par Cardiac hx includes:\par \par 1.  Hypertension.\par 2.  Hyperlipidemia.\par 3.  A very strong family history (father with CABG and brother stents).\par 4. CABG x 3   5/18/19\par \par LIMA to LAD\par SVG to OM1\par SVG to PDA\par \par  No FELIX, chest pain or palps.\par Does not exercise regularly.\par Admits to increasing fatigability.  No chest pain.\par \par Patient transition to valsartan 320 mg a day (in place of lisinopril) 7/2/2022.  Takes in p.m.\par Home blood pressure record: systolics 135 and diastolics 70s.\par

## 2023-02-15 NOTE — ASSESSMENT
[FreeTextEntry1] : ECG-  SR at 66    bpm, LAE.  RVCD, There is rightward axis deviation \par \par -----11/7/19-----1/30/20---9/21/2---4/29/21---8/23/21---5/3/22--2/2/23\par Chol---125--------120-------139------140--------138-------131----146\par LDL-----62---------53---------49--------55 --------50--------51------46\par HDL-----50---------53---------71--------69---------72-------66------81\par Tri.------63---------68---------95--------75---------81-------63\par Creat---0.91-----0.99\par H9M----4.3--------5.4----------------------------------------------------5.7\par HGB---14.9\par \par Echocardiogram 12/22/2020:\par Left ventricular ejection fraction is 60 to 65%.  No regional wall motion of maladies are noted.\par Mild left atrial enlargement\par Mild right ventricular enlargement\par Mild dilatation of the ascending aorta 4.0 cm.\par \par Exercise stress test 12/7/2020:\par Exercise 5 minutes 25 seconds\par Heart rate 151   101% max predicted\par Occasional PVCs\par 2 mm ST segment depression noted.\par No corresponding areas of SPECT ischemia.\par Small fixed inferobasilar wall defect\par \par Echocardiogram: 7/16/19\par Overall normal LV size and function.\par No significant new wall motion abnormalities.\par Mild valvular insufficiencies as stated.\par \par Regadenoson  stress test  7/19/19\par Exercised for 3 minutes and 5 seconds achieving 4 METS.\par Test converted to Lexiscan\par Peak  BPM\par Peak /90\par Occ. PACs/PVCs\par SPECT imaging shows a fixed inferior wall infarct seen preoperatively.\par There is a small area of reversibility of the anterior wall that is possibly ischemia and/or soft tissue attenuation artifact.\par \par Carotid Doppler 5/15/2019\par Minimal B/L plaque\par No significant narrowing in the visualized common carotid and internal carotid arteries\par \par Renal US 5/23/2019\par No hydronephrosis\par \par \par Impression:\par 1. S/P CABG x 3  5/18/2019. Feels well with no anginal symptoms. \par     Atypical chest discomfort which has resolved.\par     Atypical jaw pain related to TMJ\par \par 2.  Hypertension control is borderline probably increased from the diminished activity.\par     Office pressure is typically high.  Periodic dizzy spells which seem to be more related to changes in position i.e.       Disequilibrium\par      \par 3. Laboratory data suggests diminished control of his lipids with the current regimen.\par \par 4. BMI 31 with progressive weight gain being noted.  This issue has been discussed with the pt and identified as being requiring of attention and correction.\par \par 5.  Reduced overall exercise tolerance.\par Patient believes that the mask impeded him.\par Despite the ECG changes SPECT  imaging does not show any significant ischemia.\par  \par \par Plan\par 1.  Will continue valsartan 320 p.o. moved to nightly.  If BP stays high may need to move to the morning.\par     Continue amlodipine 5 mg a day and metoprolol 25 p.o. \par     Counseled the patient to continue to monitor blood pressure a few times a week.\par \par 2.  No indication for any other change in current medical regimen.\par \par 3.  The patient was instructed to follow a symptom limited regimen of moderate aerobic exercise for 30 minutes 3 to 4 days a week. A warm up and cool down period are to be added to the regimen and small incremental changes can be made every few weeks. Any new symptoms of exercise related chest pain or dyspnea should be reported.\par \par 4. Educated on proper sodium intake, not to exceed more then 2,000 mg daily. \par \par 5. Discuss dietary approaches including use of the Mediterranean diet at about 1800 calories a day\par \par 6.Labs will be obtained with PMD in 3 months and follow-up will be in 4 months\par \par 7.  We will schedule follow-up echocardiogram and exercise stress test.  \par \par Clinical follow up in 4 months

## 2023-03-16 ENCOUNTER — APPOINTMENT (OUTPATIENT)
Dept: CARDIOLOGY | Facility: CLINIC | Age: 73
End: 2023-03-16
Payer: COMMERCIAL

## 2023-03-16 PROCEDURE — 93306 TTE W/DOPPLER COMPLETE: CPT

## 2023-04-19 ENCOUNTER — APPOINTMENT (OUTPATIENT)
Dept: CARDIOLOGY | Facility: CLINIC | Age: 73
End: 2023-04-19
Payer: COMMERCIAL

## 2023-04-19 PROCEDURE — 93880 EXTRACRANIAL BILAT STUDY: CPT

## 2023-05-15 ENCOUNTER — APPOINTMENT (OUTPATIENT)
Dept: DERMATOLOGY | Facility: CLINIC | Age: 73
End: 2023-05-15
Payer: COMMERCIAL

## 2023-05-15 PROCEDURE — 99213 OFFICE O/P EST LOW 20 MIN: CPT

## 2023-06-19 ENCOUNTER — APPOINTMENT (OUTPATIENT)
Dept: CARDIOLOGY | Facility: CLINIC | Age: 73
End: 2023-06-19
Payer: COMMERCIAL

## 2023-06-19 PROCEDURE — 93015 CV STRESS TEST SUPVJ I&R: CPT

## 2023-06-23 LAB
ALBUMIN SERPL ELPH-MCNC: 4.5 G/DL
ALP BLD-CCNC: 66 U/L
ALT SERPL-CCNC: 17 U/L
ANION GAP SERPL CALC-SCNC: 11 MMOL/L
AST SERPL-CCNC: 25 U/L
BILIRUB SERPL-MCNC: 0.5 MG/DL
BUN SERPL-MCNC: 20 MG/DL
CALCIUM SERPL-MCNC: 9.8 MG/DL
CHLORIDE SERPL-SCNC: 103 MMOL/L
CHOLEST SERPL-MCNC: 136 MG/DL
CO2 SERPL-SCNC: 28 MMOL/L
CREAT SERPL-MCNC: 1.1 MG/DL
EGFR: 71 ML/MIN/1.73M2
GLUCOSE SERPL-MCNC: 95 MG/DL
HDLC SERPL-MCNC: 49 MG/DL
LDLC SERPL CALC-MCNC: 71 MG/DL
NONHDLC SERPL-MCNC: 87 MG/DL
POTASSIUM SERPL-SCNC: 4.8 MMOL/L
PROT SERPL-MCNC: 7.4 G/DL
SODIUM SERPL-SCNC: 143 MMOL/L
TRIGL SERPL-MCNC: 81 MG/DL

## 2023-06-28 ENCOUNTER — APPOINTMENT (OUTPATIENT)
Dept: CARDIOLOGY | Facility: CLINIC | Age: 73
End: 2023-06-28
Payer: COMMERCIAL

## 2023-06-28 VITALS
SYSTOLIC BLOOD PRESSURE: 132 MMHG | HEART RATE: 58 BPM | WEIGHT: 196 LBS | RESPIRATION RATE: 16 BRPM | BODY MASS INDEX: 31.5 KG/M2 | DIASTOLIC BLOOD PRESSURE: 82 MMHG | HEIGHT: 66 IN | OXYGEN SATURATION: 97 %

## 2023-06-28 PROCEDURE — 99214 OFFICE O/P EST MOD 30 MIN: CPT

## 2023-06-28 RX ORDER — TRIAMTERENE AND HYDROCHLOROTHIAZIDE 37.5; 25 MG/1; MG/1
37.5-25 CAPSULE ORAL
Qty: 90 | Refills: 2 | Status: DISCONTINUED | COMMUNITY
Start: 2019-11-04 | End: 2023-06-28

## 2023-06-28 RX ORDER — AMLODIPINE BESYLATE 5 MG/1
5 TABLET ORAL
Qty: 90 | Refills: 3 | Status: DISCONTINUED | COMMUNITY
Start: 2021-05-03 | End: 2023-06-28

## 2023-06-28 NOTE — HISTORY OF PRESENT ILLNESS
[FreeTextEntry1] : Patient with several concerns today.\par 1.  Increase urinary frequency urgency and perhaps incontinence.  He definitely has some genitourinary issues but is concerned that the Dyazide is further aggravating this situation and limiting his ability to function normally.\par \par 2.  A degree of imbalance, which has been present for several years and perhaps is worsening.  There is no vertigo.  There is no syncope or presyncope.  There is no temporal relationship to medication dosing.\par \par \par 5/13/19  Caty  MIBI:\par IWMI with moderate arlyn-infarct ischemia\par \par  5/15/2019- cardiac Catherization and was found to have significant 3VD. which he was referred for surgical management. \par \par  5/18/2019 - CABG X 3 ( LIMA TO LAD, SVG TO OM, SVG TO RAMUS).  \par \par Was converted from lisinopril to valsartan 320 mg on 7/2/2022.

## 2023-06-28 NOTE — ASSESSMENT
[FreeTextEntry1] : \par -----19-----20------21---21---5/3/22--23--23\par LDL-----62---------53---------49--------55 --------50--------51------46------71\par HDL-----50---------53---------71--------69---------72-------66------81------49\par Tri.------63---------68---------95--------75---------81-------63---------------81\par Creat---0.91-----0.99\par R0H----4.3--------5.4----------------------------------------------------5.7\par HGB---14.9\par \par Echocardiogram: 3/618/23:\par Asymmetric hypertrophy with normal LV function EF 60%\par Right ventricle slightly enlarged\par Biatrial enlargement\par Mild mitral insufficiency\par Mildly dilated ascending aorta 4.1 at the root and 3.7 in the ascending essentially unchanged from prior.0\par \par Echocardiogram 2020:\par Left ventricular ejection fraction is 60 to 65%.  No regional wall motion of maladies are noted.\par Mild left atrial enlargement\par Mild right ventricular enlargement\par Mild dilatation of the ascending aorta 4.0 cm.\par \par Stress test 2023:\par Time: 5 minutes 2 seconds (METS)\par Heart rate: 160 (109%)\par Blood pressure: Hypertensive 190/84 mmHg\par EC mm ST segment depression persisting into recovery consistent with ischemia.\par \par Exercise stress test 2020:\par Exercise 5 minutes 25 seconds\par Heart rate 151   101% max predicted\par Occasional PVCs\par 2 mm ST segment depression noted.\par No corresponding areas of SPECT ischemia.\par Small fixed inferobasilar wall defect\par \par Echocardiogram: 19\par Overall normal LV size and function.\par No significant new wall motion abnormalities.\par Mild valvular insufficiencies as stated.\par \par Regadenoson  stress test  19\par Exercised for 3 minutes and 5 seconds achieving 4 METS.\par Test converted to Lexiscan\par Peak  BPM\par Peak /90\par Occ. PACs/PVCs\par SPECT imaging shows a fixed inferior wall infarct seen preoperatively.\par There is a small area of reversibility of the anterior wall that is possibly ischemia and/or soft tissue attenuation artifact.\par \par Carotid duplex 2023:\par No evidence of any significant atherosclerosis with normal velocities.  Bilaterally\par \par Carotid Doppler 5/15/2019\par Minimal B/L plaque\par No significant narrowing in the visualized common carotid and internal carotid arteries\par \par Renal US 2019\par No hydronephrosis\par \par \par Impression:\par 1. S/P CABG x 3  2019. Feels well with no anginal symptoms. \par     Atypical chest discomfort which has resolved.\par     Atypical jaw pain related to TMJ\par     Stress testing demonstrates reduced overall exercise tolerance with 2 mm ST segment depression, which is     similar to what was seen several years ago..  At that time sestamibi imaging showed no evidence of ischemia.\par \par 2.  Hypertension control is borderline probably increased from the diminished activity.\par     Office pressure is typically high.  Periodic dizzy spells described previously sound much more like         disequilibrium.  Doubt any relationship to medications.\par       \par     Dyazide may be contributing to some degree of urinary difficulties.\par \par 3. Laboratory data suggests diminished control of his lipids with the current regimen.  LDL-71\par \par 4. BMI 31 consistent with mild obesity and essentially unchanged.\par \par 5.  Reduced overall exercise tolerance.\par     But unchanged from prior.\par  \par 6.  Minimal carotid disease noted.\par \par Plan\par 1.  Will continue valsartan 320 p.o. moved to nightly. \par      We will discontinue Dyazide\par       Add hydrochlorothiazide 12.5 mg daily\par      Discontinue amlodipine\par      Begin nifedipine 60 mg p.o. daily.\par      2-week blood pressure log to be forwarded here.\par \par 2.  No indication for any other change in current medical regimen.\par \par 3.  The patient was instructed to follow a symptom limited regimen of moderate aerobic exercise for 30 minutes 3 to 4 days a week. A warm up and cool down period are to be added to the regimen and small incremental changes can be made every few weeks. Any new symptoms of exercise related chest pain or dyspnea should be reported.\par \par 4. Educated on proper sodium intake, not to exceed more then 2,000 mg daily. \par \par 5. Discuss dietary approaches including use of the Mediterranean diet at about 1800 calories a day\par \par 6.Labs will be obtained with PMD in 3 months and follow-up will be in 4 months\par \par Clinical follow up in 4 months

## 2023-06-28 NOTE — REASON FOR VISIT
[FreeTextEntry1] : This is a 73 y/o male patient presenting for cardiac evaluation \par \par Cardiac hx includes:\par \par 1.  Hypertension.\par 2.  Hyperlipidemia.\par 3.  A very strong family history (father with CABG and brother stents).\par 4. CABG x 3   5/18/19\par \par LIMA to LAD\par SVG to OM1\par SVG to PDA\par \par  No FELIX, chest pain or palps.\par Does not exercise regularly.\par Admits to increasing fatigability.  No chest pain.\par \par Patient transition to valsartan 320 mg a day (in place of lisinopril) 7/2/2022.  Takes in p.m.\par Home blood pressure record: systolics 135 and diastolics 70s.\par

## 2023-08-16 ENCOUNTER — RX RENEWAL (OUTPATIENT)
Age: 73
End: 2023-08-16

## 2023-09-12 ENCOUNTER — RX RENEWAL (OUTPATIENT)
Age: 73
End: 2023-09-12

## 2023-10-03 LAB
ALBUMIN SERPL ELPH-MCNC: 4.6 G/DL
ALP BLD-CCNC: 64 U/L
ALT SERPL-CCNC: 16 U/L
ANION GAP SERPL CALC-SCNC: 9 MMOL/L
AST SERPL-CCNC: 23 U/L
BILIRUB SERPL-MCNC: 0.4 MG/DL
BUN SERPL-MCNC: 19 MG/DL
CALCIUM SERPL-MCNC: 9.9 MG/DL
CHLORIDE SERPL-SCNC: 102 MMOL/L
CHOLEST SERPL-MCNC: 134 MG/DL
CO2 SERPL-SCNC: 29 MMOL/L
CREAT SERPL-MCNC: 0.99 MG/DL
EGFR: 80 ML/MIN/1.73M2
GLUCOSE SERPL-MCNC: 100 MG/DL
HDLC SERPL-MCNC: 51 MG/DL
LDLC SERPL CALC-MCNC: 66 MG/DL
NONHDLC SERPL-MCNC: 83 MG/DL
POTASSIUM SERPL-SCNC: 4.7 MMOL/L
PROT SERPL-MCNC: 7.3 G/DL
SODIUM SERPL-SCNC: 140 MMOL/L
TRIGL SERPL-MCNC: 90 MG/DL

## 2023-10-04 ENCOUNTER — APPOINTMENT (OUTPATIENT)
Dept: CARDIOLOGY | Facility: CLINIC | Age: 73
End: 2023-10-04
Payer: COMMERCIAL

## 2023-10-04 VITALS
SYSTOLIC BLOOD PRESSURE: 128 MMHG | WEIGHT: 197 LBS | HEART RATE: 62 BPM | DIASTOLIC BLOOD PRESSURE: 90 MMHG | RESPIRATION RATE: 16 BRPM | OXYGEN SATURATION: 97 % | BODY MASS INDEX: 31.66 KG/M2 | HEIGHT: 66 IN

## 2023-10-04 DIAGNOSIS — I51.7 CARDIOMEGALY: ICD-10-CM

## 2023-10-04 PROCEDURE — 93000 ELECTROCARDIOGRAM COMPLETE: CPT

## 2023-10-04 PROCEDURE — 99214 OFFICE O/P EST MOD 30 MIN: CPT | Mod: 25

## 2023-10-04 RX ORDER — NIFEDIPINE 60 MG/1
60 TABLET, EXTENDED RELEASE ORAL DAILY
Qty: 30 | Refills: 3 | Status: DISCONTINUED | COMMUNITY
Start: 2023-06-28 | End: 2023-10-04

## 2023-10-04 RX ORDER — HYDROCHLOROTHIAZIDE 12.5 MG/1
12.5 CAPSULE ORAL
Qty: 90 | Refills: 3 | Status: DISCONTINUED | COMMUNITY
Start: 2023-06-28 | End: 2023-10-04

## 2023-10-04 RX ORDER — AMLODIPINE BESYLATE 5 MG/1
5 TABLET ORAL DAILY
Qty: 90 | Refills: 3 | Status: ACTIVE | COMMUNITY
Start: 1900-01-01 | End: 1900-01-01

## 2023-10-26 RX ORDER — TRIAMTERENE AND HYDROCHLOROTHIAZIDE 37.5; 25 MG/1; MG/1
37.5-25 CAPSULE ORAL
Qty: 90 | Refills: 3 | Status: ACTIVE | COMMUNITY
Start: 1900-01-01 | End: 1900-01-01

## 2023-10-29 ENCOUNTER — RX RENEWAL (OUTPATIENT)
Age: 73
End: 2023-10-29

## 2023-10-29 RX ORDER — ASPIRIN 81 MG/1
81 TABLET, COATED ORAL DAILY
Qty: 90 | Refills: 3 | Status: ACTIVE | COMMUNITY
Start: 2023-01-24 | End: 1900-01-01

## 2023-11-17 ENCOUNTER — APPOINTMENT (OUTPATIENT)
Dept: DERMATOLOGY | Facility: CLINIC | Age: 73
End: 2023-11-17
Payer: COMMERCIAL

## 2023-11-17 PROCEDURE — 99213 OFFICE O/P EST LOW 20 MIN: CPT

## 2024-01-04 ENCOUNTER — RX RENEWAL (OUTPATIENT)
Age: 74
End: 2024-01-04

## 2024-02-09 RX ORDER — SERTRALINE HYDROCHLORIDE 100 MG/1
100 TABLET, FILM COATED ORAL
Qty: 90 | Refills: 3 | Status: ACTIVE | COMMUNITY
Start: 2020-01-27 | End: 1900-01-01

## 2024-03-04 ENCOUNTER — APPOINTMENT (OUTPATIENT)
Dept: CARDIOLOGY | Facility: CLINIC | Age: 74
End: 2024-03-04
Payer: COMMERCIAL

## 2024-03-04 VITALS
WEIGHT: 200 LBS | SYSTOLIC BLOOD PRESSURE: 140 MMHG | BODY MASS INDEX: 32.14 KG/M2 | RESPIRATION RATE: 16 BRPM | HEIGHT: 66 IN | HEART RATE: 60 BPM | DIASTOLIC BLOOD PRESSURE: 80 MMHG | OXYGEN SATURATION: 98 %

## 2024-03-04 DIAGNOSIS — E78.00 PURE HYPERCHOLESTEROLEMIA, UNSPECIFIED: ICD-10-CM

## 2024-03-04 DIAGNOSIS — R60.0 LOCALIZED EDEMA: ICD-10-CM

## 2024-03-04 DIAGNOSIS — I10 ESSENTIAL (PRIMARY) HYPERTENSION: ICD-10-CM

## 2024-03-04 DIAGNOSIS — R06.02 SHORTNESS OF BREATH: ICD-10-CM

## 2024-03-04 DIAGNOSIS — I25.9 CHRONIC ISCHEMIC HEART DISEASE, UNSPECIFIED: ICD-10-CM

## 2024-03-04 DIAGNOSIS — I25.10 ATHEROSCLEROTIC HEART DISEASE OF NATIVE CORONARY ARTERY W/OUT ANGINA PECTORIS: ICD-10-CM

## 2024-03-04 LAB
ALBUMIN SERPL ELPH-MCNC: 4.6 G/DL
ALP BLD-CCNC: 66 U/L
ALT SERPL-CCNC: 17 U/L
ANION GAP SERPL CALC-SCNC: 11 MMOL/L
AST SERPL-CCNC: 22 U/L
BILIRUB SERPL-MCNC: 0.6 MG/DL
BUN SERPL-MCNC: 17 MG/DL
CALCIUM SERPL-MCNC: 9.4 MG/DL
CHLORIDE SERPL-SCNC: 104 MMOL/L
CHOLEST SERPL-MCNC: 136 MG/DL
CO2 SERPL-SCNC: 27 MMOL/L
CREAT SERPL-MCNC: 1.15 MG/DL
EGFR: 67 ML/MIN/1.73M2
GLUCOSE SERPL-MCNC: 108 MG/DL
HDLC SERPL-MCNC: 46 MG/DL
LDLC SERPL CALC-MCNC: 73 MG/DL
NONHDLC SERPL-MCNC: 91 MG/DL
POTASSIUM SERPL-SCNC: 4.5 MMOL/L
PROT SERPL-MCNC: 7.7 G/DL
SODIUM SERPL-SCNC: 142 MMOL/L
TRIGL SERPL-MCNC: 91 MG/DL

## 2024-03-04 PROCEDURE — 93000 ELECTROCARDIOGRAM COMPLETE: CPT

## 2024-03-04 PROCEDURE — G2211 COMPLEX E/M VISIT ADD ON: CPT | Mod: NC,1L

## 2024-03-04 PROCEDURE — 99214 OFFICE O/P EST MOD 30 MIN: CPT

## 2024-03-04 RX ORDER — METOPROLOL TARTRATE 25 MG/1
25 TABLET, FILM COATED ORAL
Qty: 180 | Refills: 3 | Status: ACTIVE | COMMUNITY
Start: 2019-07-01 | End: 1900-01-01

## 2024-03-04 NOTE — ASSESSMENT
[FreeTextEntry1] : ECG: Normal sinus rhythm at 60.  Left atrial enlargement.  No significant ST-T wave changes.  -----19-----20------21---21---5/3/22--23--23--10/02/23--24 LDL-----62---------53---------49--------55 --------50----------51------46--------71---------66---------73 HDL-----50---------53---------71--------69---------72---------66------81--------49---------51---------46 Tri.------63---------68---------95--------75---------81----------63-----------------81---------90---------91 Creat---0.91-----0.99 Y9N----4.3--------5.4----------------------------------------------------5.7 HGB---14.9  Echocardiogram: 3/618/23: Asymmetric hypertrophy with normal LV function EF 60% Right ventricle slightly enlarged Biatrial enlargement Mild mitral insufficiency Mildly dilated ascending aorta 4.1 at the root and 3.7 in the ascending essentially unchanged from prior.0  Echocardiogram 2020: Left ventricular ejection fraction is 60 to 65%.  No regional wall motion of maladies are noted. Mild left atrial enlargement Mild right ventricular enlargement Mild dilatation of the ascending aorta 4.0 cm.  Stress test 2023: Time: 5 minutes 2 seconds (METS) Heart rate: 160 (109%) Blood pressure: Hypertensive 190/84 mmHg EC mm ST segment depression persisting into recovery consistent with ischemia.  Exercise stress test 2020: Exercise 5 minutes 25 seconds Heart rate 151   101% max predicted Occasional PVCs 2 mm ST segment depression noted. No corresponding areas of SPECT ischemia. Small fixed inferobasilar wall defect  Echocardiogram: 19 Overall normal LV size and function. No significant new wall motion abnormalities. Mild valvular insufficiencies as stated.  Regadenoson  stress test  19 Exercised for 3 minutes and 5 seconds achieving 4 METS. Test converted to Lexiscan Peak  BPM Peak /90 Occ. PACs/PVCs SPECT imaging shows a fixed inferior wall infarct seen preoperatively. There is a small area of reversibility of the anterior wall that is possibly ischemia and/or soft tissue attenuation artifact.  Carotid duplex 2023: No evidence of any significant atherosclerosis with normal velocities.  Bilaterally  Carotid Doppler 5/15/2019 Minimal B/L plaque No significant narrowing in the visualized common carotid and internal carotid arteries  Renal US 2019 No hydronephrosis   Impression: 1. S/P CABG x 3  2019. Feels well with no anginal symptoms.  Atypical chest discomfort which has resolved. Atypical jaw pain related to TMJ. Stress testing 2023: demonstrates reduced overall exercise tolerance with 2 mm ST segment depression, which is similar to what was seen several years ago.  At that time sestamibi imaging showed no evidence of ischemia.  2.  Hypertension control is borderline probably increased from the diminished activity. Intolerance to nifedipine stated. Back on Dyazide and amlodipine which had previously controlled her blood pressure.  Current data is not available.  3.  Hyperlipidemia: Reasonably well controlled.  LDL-73  4. BMI 32, as such remains obese.  5. Reduced overall exercise tolerance. But unchanged from prior.   6.  Minimal carotid disease noted.  Plan 1.  Will continue valsartan 320 p.o. moved to nightly. Continue Dyazide and amlodipine. Continue to monitor BP.      Requested a 2-week blood pressure log be submitted for review.  2.  No indication for any other change in current medical regimen.  3.  The patient was instructed to follow a symptom limited regimen of moderate aerobic exercise for 30 minutes 3 to 4 days a week. A warm up and cool down period are to be added to the regimen and small incremental changes can be made every few weeks. Any new symptoms of exercise related chest pain or dyspnea should be reported.  4. Educated on proper sodium intake, not to exceed more then 2,000 mg daily.   5. Discuss dietary approaches including use of the Mediterranean diet at about 1800 calories a day.  6. Labs will be obtained with PMD in 3 months and follow-up will be in 4 months.  Clinical follow up in 4 months

## 2024-03-04 NOTE — PHYSICAL EXAM
[General Appearance - Well Developed] : well developed [Normal Appearance] : normal appearance [Normal Conjunctiva] : the conjunctiva exhibited no abnormalities [Normal Oral Mucosa] : normal oral mucosa [] : no respiratory distress [Heart Rate And Rhythm] : heart rate and rhythm were normal [Abnormal Walk] : normal gait [Bowel Sounds] : normal bowel sounds [Oriented To Time, Place, And Person] : oriented to person, place, and time [FreeTextEntry1] : Healing left veinous harvest graph site with no erythema  or drainage noted.\par  \par  Small collection noted to incision site on right calf with  fluctuance and induration. Measuring 3x2 CM

## 2024-03-04 NOTE — HISTORY OF PRESENT ILLNESS
[FreeTextEntry1] : 5/13/19  Caty  MIBI: IWMI with moderate arlyn-infarct ischemia   5/15/2019- cardiac Catherization and was found to have significant 3VD. which he was referred for surgical management.    5/18/2019 - CABG X 3 ( LIMA TO LAD, SVG TO OM, SVG TO RAMUS).

## 2024-03-04 NOTE — REASON FOR VISIT
[FreeTextEntry1] : This is a 72 y/o male patient presenting for cardiac evaluation   Cardiac hx includes:  1.  Hypertension. 2.  Hyperlipidemia. 3.  A very strong family history (father with CABG and brother stents). 4. CABG x 3   5/18/19  LIMA to LAD SVG to OM1 SVG to PDA   No FELIX, chest pain or palps. Does not exercise regularly. Admits to increasing fatigability.  No chest pain.  Patient transition to valsartan 320 mg a day (in place of lisinopril) 7/2/2022.  Takes in p.m. Patient has cycled through several different antihypertensive regimens starting and stopping based on symptoms and perceived efficacy.  He provides a list charting this. At this point in time he has come back to using amlodipine and Dyazide in conjunction with his valsartan and blood pressures previously with systolics in the 1 15-1 30 range and diastolics in the 66-80 range.  Now not actively tracking.

## 2024-04-08 NOTE — REVIEW OF SYSTEMS
PT orders signed by provider and faxed to [facility] with fax confirmation received.  PT EFFECTIVE 04/04/2024 1WK4.  Sent to scanning.   [Recent Weight Loss (___ Lbs)] : recent [unfilled] ~Ulb weight loss

## 2024-04-19 RX ORDER — PANTOPRAZOLE 40 MG/1
40 TABLET, DELAYED RELEASE ORAL
Qty: 90 | Refills: 1 | Status: ACTIVE | COMMUNITY
Start: 2019-05-03 | End: 1900-01-01

## 2024-05-27 NOTE — ASU PATIENT PROFILE, ADULT - VISION (WITH CORRECTIVE LENSES IF THE PATIENT USUALLY WEARS THEM):
27-May-2024 14:53
Normal vision: sees adequately in most situations; can see medication labels, newsprint

## 2024-06-23 RX ORDER — VALSARTAN 320 MG/1
320 TABLET, COATED ORAL
Qty: 90 | Refills: 2 | Status: ACTIVE | COMMUNITY
Start: 2022-05-23 | End: 1900-01-01

## 2024-08-19 NOTE — DISCHARGE NOTE PROVIDER - CARE PROVIDER_API CALL
Markus Estrada)  Surgery; Thoracic and Cardiac Surgery  [address] [place] [address]  Phone: [phone]  Fax: [phone]  Follow Up Time:     López Gallego)  Cardiovascular Disease; Interventional Cardiology  [address], [address]  [place] [address]  Phone: [phone]  Fax: [phone]  Follow Up Time:     Fernando Garrett)  Urology  [address], [address]  [place] [address]  Phone: [phone]  Fax: [phone]  Follow Up Time:
PERRL/EOMI/conjunctiva clear/non icteric sclera

## 2024-08-28 ENCOUNTER — NON-APPOINTMENT (OUTPATIENT)
Age: 74
End: 2024-08-28

## 2024-08-28 ENCOUNTER — APPOINTMENT (OUTPATIENT)
Dept: CARDIOLOGY | Facility: CLINIC | Age: 74
End: 2024-08-28
Payer: COMMERCIAL

## 2024-08-28 VITALS
RESPIRATION RATE: 16 BRPM | SYSTOLIC BLOOD PRESSURE: 146 MMHG | WEIGHT: 197 LBS | HEIGHT: 66 IN | DIASTOLIC BLOOD PRESSURE: 80 MMHG | BODY MASS INDEX: 31.66 KG/M2 | HEART RATE: 56 BPM | OXYGEN SATURATION: 98 %

## 2024-08-28 DIAGNOSIS — E78.00 PURE HYPERCHOLESTEROLEMIA, UNSPECIFIED: ICD-10-CM

## 2024-08-28 DIAGNOSIS — I10 ESSENTIAL (PRIMARY) HYPERTENSION: ICD-10-CM

## 2024-08-28 DIAGNOSIS — I77.819 AORTIC ECTASIA, UNSPECIFIED SITE: ICD-10-CM

## 2024-08-28 DIAGNOSIS — I25.10 ATHEROSCLEROTIC HEART DISEASE OF NATIVE CORONARY ARTERY W/OUT ANGINA PECTORIS: ICD-10-CM

## 2024-08-28 DIAGNOSIS — I51.7 CARDIOMEGALY: ICD-10-CM

## 2024-08-28 DIAGNOSIS — R60.0 LOCALIZED EDEMA: ICD-10-CM

## 2024-08-28 DIAGNOSIS — R07.9 CHEST PAIN, UNSPECIFIED: ICD-10-CM

## 2024-08-28 PROCEDURE — 99214 OFFICE O/P EST MOD 30 MIN: CPT

## 2024-08-28 PROCEDURE — 93000 ELECTROCARDIOGRAM COMPLETE: CPT

## 2024-08-28 PROCEDURE — G2211 COMPLEX E/M VISIT ADD ON: CPT | Mod: NC

## 2024-08-28 NOTE — REVIEW OF SYSTEMS
[Weight Loss (___ Lbs)] : [unfilled] ~Ulb weight loss [Tinnitus] : tinnitus [Dizziness] : dizziness [Negative] : Gastrointestinal [FreeTextEntry9] : neck pain

## 2024-08-28 NOTE — PHYSICAL EXAM
[Well Developed] : well developed [Well Nourished] : well nourished [No Acute Distress] : no acute distress [Normal Conjunctiva] : normal conjunctiva [Normal Venous Pressure] : normal venous pressure [No Carotid Bruit] : no carotid bruit [Normal S1, S2] : normal S1, S2 [No Rub] : no rub [No Gallop] : no gallop [Murmur] : murmur [Clear Lung Fields] : clear lung fields [Good Air Entry] : good air entry [No Respiratory Distress] : no respiratory distress  [Soft] : abdomen soft [Non Tender] : non-tender [No Masses/organomegaly] : no masses/organomegaly [Normal Bowel Sounds] : normal bowel sounds [No Cyanosis] : no cyanosis [No Clubbing] : no clubbing [No Varicosities] : no varicosities [Edema ___] : edema [unfilled] [Moves all extremities] : moves all extremities [No Focal Deficits] : no focal deficits [Normal Speech] : normal speech [Alert and Oriented] : alert and oriented [Normal memory] : normal memory [de-identified] : II/VI systolic murmur [de-identified] : anterior chest with focal tenderness to palpation

## 2024-08-28 NOTE — ASSESSMENT
[FreeTextEntry1] : EKG: Sinus bradycardia at 56 bpm, LAE, RSR in V1, likely normal variant. No significant ST or T wave abnormalities.                 24 Chol--------132 HDL---------49 LDL----------67 Trigl----------84  Echocardiogram: 3/618/23: Asymmetric hypertrophy with normal LV function EF 60% Right ventricle slightly enlarged Biatrial enlargement Mild mitral insufficiency Mildly dilated ascending aorta 4.1 at the root and 3.7 in the ascending essentially unchanged from prior.  Echocardiogram 2020: Left ventricular ejection fraction is 60 to 65%. No regional wall motion of maladies are noted. Mild left atrial enlargement Mild right ventricular enlargement Mild dilatation of the ascending aorta 4.0 cm.  Stress test 2023: Time: 5 minutes 2 seconds (METS) Heart rate: 160 (109%) Blood pressure: Hypertensive 190/84 mmHg EC mm ST segment depression persisting into recovery consistent with ischemia.  Exercise stress test 2020: Exercise 5 minutes 25 seconds Heart rate 151 101% max predicted Occasional PVCs 2 mm ST segment depression noted. No corresponding areas of SPECT ischemia. Small fixed inferobasilar wall defect  Echocardiogram: 19 Overall normal LV size and function. No significant new wall motion abnormalities. Mild valvular insufficiencies as stated.  Regadenoson stress test 19 Exercised for 3 minutes and 5 seconds achieving 4 METS. Test converted to Lexiscan Peak  BPM Peak /90 Occ. PACs/PVCs SPECT imaging shows a fixed inferior wall infarct seen preoperatively. There is a small area of reversibility of the anterior wall that is possibly ischemia and/or soft tissue attenuation artifact.  Carotid duplex 2023: No evidence of any significant atherosclerosis with normal velocities. Bilaterally  Carotid Doppler 5/15/2019 Minimal B/L plaque No significant narrowing in the visualized common carotid and internal carotid arteries  Renal US 2019 No hydronephrosis  IMPRESSION:  1. CAD: Patient is S/P CABG x 3 in 2019. No clear anginal symptom. Has chest discomfort with lifting heavy bags which may be musculoskeletal in nature considering he has tenderness to that area on palpation. No anginal symptoms when going up the stairs. Symptom prior to CABG was that of chest pain after going up stairs. Stress testing 2023: demonstrates reduced overall exercise tolerance with 2 mm ST segment depression, which is similar to what was seen several years ago. At that time sestamibi imaging showed no evidence of ischemia. EKG today is stable and has no ischemic changes.   2. HLD: Lipids at goal on Vytorin 10-20 mg.   3. Blood pressure here elevated. Bood pressures were better controlled in the past per home BP log. Has not been checking home BP's.   4. Most recent echocardiogram showed normal LVEF. Has mild aortic dilatation and mild MR. No signs of HF on exam.   5. Headaches associated with neck pain and right arm pain more consistent with cervical radiculopathy.   PLAN:  1. Nuclear stress testing to evaluate for any inducible ischemia.   2. Echocardiogram to reassess valvular insufficiency and RV enlargement.   3. Monitor home blood pressure for 1 week and call our office with results. If home BP is elevated, will consider adding diuretic therapy. Advised to bring BP log to follow-up visits. Avoid salt.   4. Continue to follow a heart healthy diet consisting of more vegetables, leans meats, whole grains, nuts and fruits. Avoid trans fats, saturated fats and processed meats.  5. Continue current CV medications at current doses including Valsartan 320 mg daily for HTN, CAD and HLD.   6. See PMD regarding headaches and neck pain. Consider PT for balance issues.   Testing to be done then clinical follow-up in 2 months or sooner if needed.  EKG obtained to assist in diagnosis and management of assessed problem(s).

## 2024-08-28 NOTE — REASON FOR VISIT
[FreeTextEntry1] : ANÍBAL RAMEY is a 74 year-old  M presents here for cardiac follow-up. His medical history includes: - HTN - HLD - CAD, S/P CABG x 3 5/18/19 (LIMA to LAD, SVG to OM, SVG to PDA)

## 2024-08-28 NOTE — HISTORY OF PRESENT ILLNESS
[FreeTextEntry1] : Patient reports occasional headaches associated with neck pain and right arm "feeling different". Denies any vision changes or weakness.   Reports occasional chest discomfort when carrying heavy bags of groceries. States this has been ongoing for several years. Discomfort is relieved within a few seconds to a minute. Denies any associated diaphoresis, shortness of breath or lightheadedness. Is able to go up a flight of stairs without any CP, SOB, diaphoresis.  Reports feeling more tired when grocery shopping. Does not exercise.   Has chronic imbalance issues and tinnitus.   Has not been checking his blood pressures at home. Reports compliance with CV medications and tolerating. Admits to eating salty foods such as pizza.

## 2024-09-16 ENCOUNTER — APPOINTMENT (OUTPATIENT)
Dept: CARDIOLOGY | Facility: CLINIC | Age: 74
End: 2024-09-16
Payer: COMMERCIAL

## 2024-09-16 PROCEDURE — 93015 CV STRESS TEST SUPVJ I&R: CPT

## 2024-09-16 PROCEDURE — 78452 HT MUSCLE IMAGE SPECT MULT: CPT

## 2024-09-16 PROCEDURE — A9500: CPT

## 2024-09-16 RX ORDER — REGADENOSON 0.08 MG/ML
0.4 INJECTION, SOLUTION INTRAVENOUS
Qty: 1 | Refills: 0 | Status: COMPLETED | OUTPATIENT
Start: 2024-09-16

## 2024-09-17 ENCOUNTER — APPOINTMENT (OUTPATIENT)
Dept: CARDIOLOGY | Facility: CLINIC | Age: 74
End: 2024-09-17

## 2024-09-18 ENCOUNTER — NON-APPOINTMENT (OUTPATIENT)
Age: 74
End: 2024-09-18

## 2024-09-23 ENCOUNTER — APPOINTMENT (OUTPATIENT)
Dept: CARDIOLOGY | Facility: CLINIC | Age: 74
End: 2024-09-23
Payer: COMMERCIAL

## 2024-09-23 PROCEDURE — 93306 TTE W/DOPPLER COMPLETE: CPT

## 2024-09-25 RX ORDER — KIT FOR THE PREPARATION OF TECHNETIUM TC99M SESTAMIBI 1 MG/5ML
INJECTION, POWDER, LYOPHILIZED, FOR SOLUTION PARENTERAL
Refills: 0 | Status: COMPLETED | OUTPATIENT
Start: 2024-09-25

## 2024-09-25 RX ADMIN — KIT FOR THE PREPARATION OF TECHNETIUM TC99M SESTAMIBI 0: 1 INJECTION, POWDER, LYOPHILIZED, FOR SOLUTION PARENTERAL at 00:00

## 2024-09-30 ENCOUNTER — APPOINTMENT (OUTPATIENT)
Dept: CARDIOLOGY | Facility: CLINIC | Age: 74
End: 2024-09-30
Payer: COMMERCIAL

## 2024-09-30 VITALS
OXYGEN SATURATION: 98 % | BODY MASS INDEX: 31.82 KG/M2 | HEART RATE: 61 BPM | HEIGHT: 66 IN | SYSTOLIC BLOOD PRESSURE: 142 MMHG | RESPIRATION RATE: 16 BRPM | WEIGHT: 198 LBS | DIASTOLIC BLOOD PRESSURE: 80 MMHG

## 2024-09-30 DIAGNOSIS — I25.9 CHRONIC ISCHEMIC HEART DISEASE, UNSPECIFIED: ICD-10-CM

## 2024-09-30 DIAGNOSIS — R06.02 SHORTNESS OF BREATH: ICD-10-CM

## 2024-09-30 DIAGNOSIS — I10 ESSENTIAL (PRIMARY) HYPERTENSION: ICD-10-CM

## 2024-09-30 DIAGNOSIS — I25.10 ATHEROSCLEROTIC HEART DISEASE OF NATIVE CORONARY ARTERY W/OUT ANGINA PECTORIS: ICD-10-CM

## 2024-09-30 DIAGNOSIS — R60.0 LOCALIZED EDEMA: ICD-10-CM

## 2024-09-30 DIAGNOSIS — E78.00 PURE HYPERCHOLESTEROLEMIA, UNSPECIFIED: ICD-10-CM

## 2024-09-30 DIAGNOSIS — I51.7 CARDIOMEGALY: ICD-10-CM

## 2024-09-30 DIAGNOSIS — I77.819 AORTIC ECTASIA, UNSPECIFIED SITE: ICD-10-CM

## 2024-09-30 PROCEDURE — 99214 OFFICE O/P EST MOD 30 MIN: CPT

## 2024-09-30 PROCEDURE — G2211 COMPLEX E/M VISIT ADD ON: CPT | Mod: NC

## 2024-09-30 NOTE — PHYSICAL EXAM
[Well Developed] : well developed [Well Nourished] : well nourished [No Acute Distress] : no acute distress [Normal Conjunctiva] : normal conjunctiva [Normal Venous Pressure] : normal venous pressure [No Carotid Bruit] : no carotid bruit [Normal S1, S2] : normal S1, S2 [No Rub] : no rub [No Gallop] : no gallop [Murmur] : murmur [Clear Lung Fields] : clear lung fields [Good Air Entry] : good air entry [No Respiratory Distress] : no respiratory distress  [Soft] : abdomen soft [Non Tender] : non-tender [No Masses/organomegaly] : no masses/organomegaly [Normal Bowel Sounds] : normal bowel sounds [No Cyanosis] : no cyanosis [No Clubbing] : no clubbing [No Varicosities] : no varicosities [Edema ___] : edema [unfilled] [Moves all extremities] : moves all extremities [No Focal Deficits] : no focal deficits [Normal Speech] : normal speech [Alert and Oriented] : alert and oriented [Normal memory] : normal memory [de-identified] : II/VI systolic murmur [de-identified] : anterior chest with focal tenderness to palpation

## 2024-09-30 NOTE — ASSESSMENT
[FreeTextEntry1] : ------------24 Chol--------132 HDL---------49 LDL----------67 Trigl----------84  Echocardiogram 2024 Overall normal LV size and function LVEF 60% Mild biatrial enlargement Mild mitral tricuspid insufficiency Mild dilatation of the aortic root and ascending aorta. Essentially unchanged from prior study.  Echocardiogram: 3/618/23: Asymmetric hypertrophy with normal LV function EF 60% Right ventricle slightly enlarged Biatrial enlargement Mild mitral insufficiency Mildly dilated ascending aorta 4.1 at the root and 3.7 in the ascending essentially unchanged from prior.  Echocardiogram 2020: Left ventricular ejection fraction is 60 to 65%. No regional wall motion of maladies are noted. Mild left atrial enlargement Mild right ventricular enlargement Mild dilatation of the ascending aorta 4.0 cm.  Stress test 2023: Time: 5 minutes 2 seconds (METS) Heart rate: 160 (109%) Blood pressure: Hypertensive 190/84 mmHg EC mm ST segment depression persisting into recovery consistent with ischemia.  Exercise stress test 2020: Exercise 5 minutes 25 seconds Heart rate 151 101% max predicted Occasional PVCs 2 mm ST segment depression noted. No corresponding areas of SPECT ischemia. Small fixed inferobasilar wall defect  Echocardiogram: 19 Overall normal LV size and function. No significant new wall motion abnormalities. Mild valvular insufficiencies as stated.  Pharmacologic nuclear stress 2024 Small moderate defect in the basal inferior wall that is fixed.  Consider infarction versus artifact. Medium size mild defect inferolateral wall partially reversible. Gated ejection fraction 64% Compared to study of 2020 the inferolateral wall reversibility is new.  Regadenoson stress test 19 Exercised for 3 minutes and 5 seconds achieving 4 METS. Test converted to Lexiscan Peak  BPM Peak /90 Occ. PACs/PVCs SPECT imaging shows a fixed inferior wall infarct seen preoperatively. There is a small area of reversibility of the anterior wall that is possibly ischemia and/or soft tissue attenuation artifact.  Carotid duplex 2023: No evidence of any significant atherosclerosis with normal velocities. Bilaterally  Carotid Doppler 5/15/2019 Minimal B/L plaque No significant narrowing in the visualized common carotid and internal carotid arteries  Renal US 2019 No hydronephrosis  IMPRESSION:  1. CAD: Patient is S/P CABG x 3 in 2019. No clear anginal symptom. Has chest discomfort with lifting heavy bags which may be musculoskeletal in nature considering he has tenderness to that area on palpation. No anginal symptoms when going up the stairs. Symptom prior to CABG was that of chest pain after going up stairs.  Stress testing 2023:--reduced overall exercise tolerance with 2 mm ST segment depression---similar to -several years ago.Sestamibi imaging showed no evidence of ischemia.  Nuclear stress test 2024 shows a persistent inferior wall defect but some slight reversibility inferolaterally, which may be new versus artifactual..  2. HLD: Lipids at goal on Vytorin 10-20 mg.   3. Blood pressure here elevated.  Somewhat similar to what is seen on his blood pressure log.  4. Current echocardiogram showed normal LVEF. Has mild aortic dilatation and mild MR. No signs of HF on exam.   5. Headaches associated with neck pain and right arm pain more consistent with cervical radiculopathy.   PLAN:  1.  Patient's symptoms are fairly atypical and not likely to be ischemic in nature.    The findings on the stress test are somewhat soft and at this point more likely to be artifact than anything else.  2. Echocardiogram to reassess valvular insufficiency, dilated aorta and RV enlargement in 1 year.  3.  Will move valsartan from p.m. dosing to the morning and have him reassess blood pressures during the day.  Monitor home blood pressure for 1 week and call our office with results. If home BP is elevated, will consider adding diuretic therapy. Advised to bring BP log to follow-up visits. Avoid salt.   4. Continue to follow a heart healthy diet consisting of more vegetables, leans meats, whole grains, nuts and fruits. Avoid trans fats, saturated fats and processed meats.  5. Continue current CV medications at current doses including Valsartan 320 mg daily for HTN, CAD and HLD.   6. See PMD regarding headaches and neck pain. Consider PT for balance issues.    EKG obtained to assist in diagnosis and management of assessed problem(s).

## 2024-09-30 NOTE — PHYSICAL EXAM
[Well Developed] : well developed [Well Nourished] : well nourished [No Acute Distress] : no acute distress [Normal Conjunctiva] : normal conjunctiva [Normal Venous Pressure] : normal venous pressure [No Carotid Bruit] : no carotid bruit [Normal S1, S2] : normal S1, S2 [No Rub] : no rub [No Gallop] : no gallop [Murmur] : murmur [Clear Lung Fields] : clear lung fields [Good Air Entry] : good air entry [No Respiratory Distress] : no respiratory distress  [Soft] : abdomen soft [Non Tender] : non-tender [No Masses/organomegaly] : no masses/organomegaly [Normal Bowel Sounds] : normal bowel sounds [No Cyanosis] : no cyanosis [No Clubbing] : no clubbing [No Varicosities] : no varicosities [Edema ___] : edema [unfilled] [Moves all extremities] : moves all extremities [No Focal Deficits] : no focal deficits [Normal Speech] : normal speech [Alert and Oriented] : alert and oriented [Normal memory] : normal memory [de-identified] : II/VI systolic murmur [de-identified] : anterior chest with focal tenderness to palpation

## 2024-09-30 NOTE — REVIEW OF SYSTEMS
[Tinnitus] : tinnitus [Dizziness] : dizziness [Negative] : Gastrointestinal [Weight Gain (___ Lbs)] : no recent weight gain [Weight Loss (___ Lbs)] : no recent weight loss [FreeTextEntry9] : neck pain

## 2024-09-30 NOTE — HISTORY OF PRESENT ILLNESS
[FreeTextEntry1] : Patient here to review the results of noninvasive testing.  Reports occasional chest discomfort when carrying heavy bags of groceries. States this has been ongoing for several years. Discomfort is relieved within a few seconds to a minute. Denies any associated diaphoresis, shortness of breath or lightheadedness. Is able to go up a flight of stairs without any CP, SOB, diaphoresis.  Reports feeling more tired when grocery shopping. Does not exercise.   Has chronic imbalance issues and tinnitus.   Home blood pressure record was reviewed demonstrated an average of 139/85.

## 2025-02-10 ENCOUNTER — APPOINTMENT (OUTPATIENT)
Dept: DERMATOLOGY | Facility: CLINIC | Age: 75
End: 2025-02-10

## 2025-02-10 PROCEDURE — 99213 OFFICE O/P EST LOW 20 MIN: CPT

## 2025-03-10 ENCOUNTER — APPOINTMENT (OUTPATIENT)
Dept: NEUROLOGY | Facility: CLINIC | Age: 75
End: 2025-03-10
Payer: COMMERCIAL

## 2025-03-10 VITALS — HEIGHT: 66 IN | HEART RATE: 60 BPM | SYSTOLIC BLOOD PRESSURE: 170 MMHG | DIASTOLIC BLOOD PRESSURE: 93 MMHG

## 2025-03-10 DIAGNOSIS — Z87.19 PERSONAL HISTORY OF OTHER DISEASES OF THE DIGESTIVE SYSTEM: ICD-10-CM

## 2025-03-10 DIAGNOSIS — Z86.39 PERSONAL HISTORY OF OTHER ENDOCRINE, NUTRITIONAL AND METABOLIC DISEASE: ICD-10-CM

## 2025-03-10 DIAGNOSIS — Z86.79 PERSONAL HISTORY OF OTHER DISEASES OF THE CIRCULATORY SYSTEM: ICD-10-CM

## 2025-03-10 DIAGNOSIS — R26.89 OTHER ABNORMALITIES OF GAIT AND MOBILITY: ICD-10-CM

## 2025-03-10 DIAGNOSIS — Z87.438 PERSONAL HISTORY OF OTHER DISEASES OF MALE GENITAL ORGANS: ICD-10-CM

## 2025-03-10 PROCEDURE — G2211 COMPLEX E/M VISIT ADD ON: CPT | Mod: NC

## 2025-03-10 PROCEDURE — 99204 OFFICE O/P NEW MOD 45 MIN: CPT

## 2025-04-07 ENCOUNTER — APPOINTMENT (OUTPATIENT)
Dept: MRI IMAGING | Facility: CLINIC | Age: 75
End: 2025-04-07

## 2025-04-07 ENCOUNTER — OUTPATIENT (OUTPATIENT)
Dept: OUTPATIENT SERVICES | Facility: HOSPITAL | Age: 75
LOS: 1 days | End: 2025-04-07
Payer: COMMERCIAL

## 2025-04-07 DIAGNOSIS — Z98.890 OTHER SPECIFIED POSTPROCEDURAL STATES: Chronic | ICD-10-CM

## 2025-04-07 DIAGNOSIS — Z95.1 PRESENCE OF AORTOCORONARY BYPASS GRAFT: Chronic | ICD-10-CM

## 2025-04-07 DIAGNOSIS — R26.89 OTHER ABNORMALITIES OF GAIT AND MOBILITY: ICD-10-CM

## 2025-04-07 PROCEDURE — 70551 MRI BRAIN STEM W/O DYE: CPT

## 2025-04-07 PROCEDURE — 70551 MRI BRAIN STEM W/O DYE: CPT | Mod: 26

## 2025-04-10 ENCOUNTER — APPOINTMENT (OUTPATIENT)
Dept: NEUROLOGY | Facility: CLINIC | Age: 75
End: 2025-04-10
Payer: COMMERCIAL

## 2025-04-10 PROCEDURE — 95886 MUSC TEST DONE W/N TEST COMP: CPT

## 2025-04-10 PROCEDURE — 95909 NRV CNDJ TST 5-6 STUDIES: CPT

## 2025-04-14 ENCOUNTER — LABORATORY RESULT (OUTPATIENT)
Age: 75
End: 2025-04-14

## 2025-04-22 ENCOUNTER — RESULT REVIEW (OUTPATIENT)
Age: 75
End: 2025-04-22

## 2025-04-22 ENCOUNTER — APPOINTMENT (OUTPATIENT)
Dept: DERMATOLOGY | Facility: CLINIC | Age: 75
End: 2025-04-22
Payer: COMMERCIAL

## 2025-04-22 PROCEDURE — 11102 TANGNTL BX SKIN SINGLE LES: CPT

## 2025-04-25 ENCOUNTER — APPOINTMENT (OUTPATIENT)
Dept: NEUROLOGY | Facility: CLINIC | Age: 75
End: 2025-04-25
Payer: COMMERCIAL

## 2025-04-25 VITALS
DIASTOLIC BLOOD PRESSURE: 82 MMHG | SYSTOLIC BLOOD PRESSURE: 157 MMHG | BODY MASS INDEX: 31.82 KG/M2 | HEIGHT: 66 IN | HEART RATE: 61 BPM | WEIGHT: 198 LBS

## 2025-04-25 DIAGNOSIS — G62.9 POLYNEUROPATHY, UNSPECIFIED: ICD-10-CM

## 2025-04-25 DIAGNOSIS — R42 DIZZINESS AND GIDDINESS: ICD-10-CM

## 2025-04-25 DIAGNOSIS — R26.89 OTHER ABNORMALITIES OF GAIT AND MOBILITY: ICD-10-CM

## 2025-04-25 PROCEDURE — 99213 OFFICE O/P EST LOW 20 MIN: CPT

## 2025-04-25 PROCEDURE — G2211 COMPLEX E/M VISIT ADD ON: CPT | Mod: NC

## 2025-04-25 RX ORDER — TRIAMTERENE AND HYDROCHLOROTHIAZIDE 37.5; 25 MG/1; MG/1
37.5-25 CAPSULE ORAL
Refills: 0 | Status: ACTIVE | COMMUNITY

## 2025-04-30 LAB
ALBUMIN SERPL ELPH-MCNC: 4.6 G/DL
ALP BLD-CCNC: 74 U/L
ALT SERPL-CCNC: 19 U/L
ANION GAP SERPL CALC-SCNC: 14 MMOL/L
AST SERPL-CCNC: 28 U/L
BILIRUB SERPL-MCNC: 0.6 MG/DL
BUN SERPL-MCNC: 18 MG/DL
CALCIUM SERPL-MCNC: 9.8 MG/DL
CHLORIDE SERPL-SCNC: 101 MMOL/L
CHOLEST SERPL-MCNC: 143 MG/DL
CO2 SERPL-SCNC: 25 MMOL/L
CREAT SERPL-MCNC: 1.07 MG/DL
EGFRCR SERPLBLD CKD-EPI 2021: 73 ML/MIN/1.73M2
ESTIMATED AVERAGE GLUCOSE: 126 MG/DL
GLUCOSE SERPL-MCNC: 97 MG/DL
HBA1C MFR BLD HPLC: 6 %
HDLC SERPL-MCNC: 49 MG/DL
LDLC SERPL-MCNC: 77 MG/DL
NONHDLC SERPL-MCNC: 94 MG/DL
POTASSIUM SERPL-SCNC: 4.7 MMOL/L
PROT SERPL-MCNC: 7.8 G/DL
SODIUM SERPL-SCNC: 140 MMOL/L
TRIGL SERPL-MCNC: 88 MG/DL

## 2025-05-06 ENCOUNTER — APPOINTMENT (OUTPATIENT)
Dept: CARDIOLOGY | Facility: CLINIC | Age: 75
End: 2025-05-06
Payer: COMMERCIAL

## 2025-05-06 VITALS
OXYGEN SATURATION: 98 % | DIASTOLIC BLOOD PRESSURE: 80 MMHG | WEIGHT: 203 LBS | HEART RATE: 59 BPM | RESPIRATION RATE: 16 BRPM | BODY MASS INDEX: 32.62 KG/M2 | SYSTOLIC BLOOD PRESSURE: 140 MMHG | HEIGHT: 66 IN

## 2025-05-06 DIAGNOSIS — R26.89 OTHER ABNORMALITIES OF GAIT AND MOBILITY: ICD-10-CM

## 2025-05-06 DIAGNOSIS — G62.9 POLYNEUROPATHY, UNSPECIFIED: ICD-10-CM

## 2025-05-06 DIAGNOSIS — I25.9 CHRONIC ISCHEMIC HEART DISEASE, UNSPECIFIED: ICD-10-CM

## 2025-05-06 DIAGNOSIS — I10 ESSENTIAL (PRIMARY) HYPERTENSION: ICD-10-CM

## 2025-05-06 DIAGNOSIS — I77.819 AORTIC ECTASIA, UNSPECIFIED SITE: ICD-10-CM

## 2025-05-06 DIAGNOSIS — I51.7 CARDIOMEGALY: ICD-10-CM

## 2025-05-06 DIAGNOSIS — I25.10 ATHEROSCLEROTIC HEART DISEASE OF NATIVE CORONARY ARTERY W/OUT ANGINA PECTORIS: ICD-10-CM

## 2025-05-06 PROCEDURE — 93000 ELECTROCARDIOGRAM COMPLETE: CPT

## 2025-05-06 PROCEDURE — G2211 COMPLEX E/M VISIT ADD ON: CPT | Mod: NC

## 2025-05-06 PROCEDURE — 99214 OFFICE O/P EST MOD 30 MIN: CPT
